# Patient Record
Sex: MALE | Race: WHITE | Employment: OTHER | URBAN - METROPOLITAN AREA
[De-identification: names, ages, dates, MRNs, and addresses within clinical notes are randomized per-mention and may not be internally consistent; named-entity substitution may affect disease eponyms.]

---

## 2017-04-04 DIAGNOSIS — E78.2 MIXED HYPERLIPIDEMIA: ICD-10-CM

## 2017-04-05 RX ORDER — SIMVASTATIN 20 MG/1
20 TABLET, FILM COATED ORAL
Qty: 90 TAB | Refills: 3 | Status: SHIPPED | OUTPATIENT
Start: 2017-04-05 | End: 2017-04-11 | Stop reason: SDUPTHER

## 2017-04-11 ENCOUNTER — OFFICE VISIT (OUTPATIENT)
Dept: FAMILY MEDICINE CLINIC | Age: 72
End: 2017-04-11

## 2017-04-11 ENCOUNTER — HOSPITAL ENCOUNTER (OUTPATIENT)
Dept: LAB | Age: 72
Discharge: HOME | End: 2017-04-11
Payer: MEDICARE

## 2017-04-11 VITALS
HEIGHT: 66 IN | OXYGEN SATURATION: 96 % | WEIGHT: 197 LBS | BODY MASS INDEX: 31.66 KG/M2 | TEMPERATURE: 98 F | SYSTOLIC BLOOD PRESSURE: 112 MMHG | HEART RATE: 71 BPM | DIASTOLIC BLOOD PRESSURE: 59 MMHG | RESPIRATION RATE: 16 BRPM

## 2017-04-11 DIAGNOSIS — I10 ESSENTIAL HYPERTENSION, BENIGN: Primary | ICD-10-CM

## 2017-04-11 DIAGNOSIS — E78.2 MIXED HYPERLIPIDEMIA: ICD-10-CM

## 2017-04-11 DIAGNOSIS — R73.9 ELEVATED BLOOD SUGAR: ICD-10-CM

## 2017-04-11 DIAGNOSIS — E55.9 VITAMIN D DEFICIENCY: ICD-10-CM

## 2017-04-11 DIAGNOSIS — E87.1 HYPONATREMIA: ICD-10-CM

## 2017-04-11 DIAGNOSIS — R73.03 PRE-DIABETES: ICD-10-CM

## 2017-04-11 DIAGNOSIS — C61 PROSTATE CA (HCC): ICD-10-CM

## 2017-04-11 PROCEDURE — 80061 LIPID PANEL: CPT

## 2017-04-11 PROCEDURE — 85025 COMPLETE CBC W/AUTO DIFF WBC: CPT

## 2017-04-11 PROCEDURE — 80053 COMPREHEN METABOLIC PANEL: CPT

## 2017-04-11 PROCEDURE — 82306 VITAMIN D 25 HYDROXY: CPT

## 2017-04-11 PROCEDURE — 86141 C-REACTIVE PROTEIN HS: CPT

## 2017-04-11 PROCEDURE — 83036 HEMOGLOBIN GLYCOSYLATED A1C: CPT

## 2017-04-11 PROCEDURE — 83090 ASSAY OF HOMOCYSTEINE: CPT

## 2017-04-11 RX ORDER — OLMESARTAN MEDOXOMIL 40 MG/1
TABLET ORAL
Qty: 90 TAB | Refills: 3 | Status: SHIPPED | OUTPATIENT
Start: 2017-04-11 | End: 2017-07-07 | Stop reason: SDUPTHER

## 2017-04-11 RX ORDER — SIMVASTATIN 20 MG/1
20 TABLET, FILM COATED ORAL
Qty: 90 TAB | Refills: 3 | Status: SHIPPED | OUTPATIENT
Start: 2017-04-11 | End: 2018-07-09 | Stop reason: SDUPTHER

## 2017-04-11 NOTE — LETTER
4/12/2017 4:18 PM 
 
Mr. Mavis Degroot [address] [facility] [address] Dear Mavis Grijalva: 
 
Please find your most recent results below. Resulted Orders LIPID PANEL Result Value Ref Range Cholesterol, total 158 100 - 199 mg/dL Triglyceride 103 0 - 149 mg/dL HDL Cholesterol 48 >39 mg/dL VLDL, calculated 21 5 - 40 mg/dL LDL, calculated 89 0 - 99 mg/dL Narrative Performed at:  [address]  [id number] : Ebony Alva MD, Phone:  [phone] METABOLIC PANEL, COMPREHENSIVE Result Value Ref Range Glucose 93 65 - 99 mg/dL BUN 18 8 - 27 mg/dL Creatinine 1.05 0.76 - 1.27 mg/dL GFR est non-AA 70 >59 mL/min/1.73 GFR est AA 80 >59 mL/min/1.73  
 BUN/Creatinine ratio 17 10 - 24 Sodium 134 134 - 144 mmol/L Potassium 5.1 3.5 - 5.2 mmol/L Chloride 93 (L) 96 - 106 mmol/L  
 CO2 25 18 - 29 mmol/L Calcium 9.1 8.6 - 10.2 mg/dL Protein, total 6.4 6.0 - 8.5 g/dL Albumin 4.2 3.5 - 4.8 g/dL GLOBULIN, TOTAL 2.2 1.5 - 4.5 g/dL A-G Ratio 1.9 1.2 - 2.2 Bilirubin, total 0.7 0.0 - 1.2 mg/dL Alk. phosphatase 38 (L) 39 - 117 IU/L  
 AST (SGOT) 28 0 - 40 IU/L  
 ALT (SGPT) 19 0 - 44 IU/L Narrative Performed at:  [address]  [id number] : Ebony Alva MD, Phone:  [phone] CRP, HIGH SENSITIVITY Result Value Ref Range C-Reactive Protein, Cardiac 0.62 0.00 - 3.00 mg/L Comment:  
            Relative Risk for Future Cardiovascular Event Low                 <1.00 Average       1.00 - 3.00 High                >3.00 Narrative Performed at:  [address]  [id number] : Ebony Alva MD, Phone:  [phone] CBC WITH AUTOMATED DIFF Result Value Ref Range WBC 6.0 3.4 - 10.8 x10E3/uL  
 RBC 4.66 4.14 - 5.80 x10E6/uL HGB 14.3 12.6 - 17.7 g/dL HCT 42.8 37.5 - 51.0 % MCV 92 79 - 97 fL  
 MCH 30.7 26.6 - 33.0 pg  
 MCHC 33.4 31.5 - 35.7 g/dL  
 RDW 13.8 12.3 - 15.4 % PLATELET 350 958 - 553 x10E3/uL NEUTROPHILS 60 % Lymphocytes 23 % MONOCYTES 12 % EOSINOPHILS 4 % BASOPHILS 1 %  
 ABS. NEUTROPHILS 3.6 1.4 - 7.0 x10E3/uL Abs Lymphocytes 1.4 0.7 - 3.1 x10E3/uL  
 ABS. MONOCYTES 0.7 0.1 - 0.9 x10E3/uL  
 ABS. EOSINOPHILS 0.2 0.0 - 0.4 x10E3/uL  
 ABS. BASOPHILS 0.0 0.0 - 0.2 x10E3/uL IMMATURE GRANULOCYTES 0 %  
 ABS. IMM. GRANS. 0.0 0.0 - 0.1 x10E3/uL Narrative Performed at:  [address]  [id number] : Olivia Fairbanks MD, Phone:  [phone] HEMOGLOBIN A1C WITH EAG Result Value Ref Range Hemoglobin A1c 5.8 (H) 4.8 - 5.6 % Comment:  
            Pre-diabetes: 5.7 - 6.4 Diabetes: >6.4 Glycemic control for adults with diabetes: <7.0 Estimated average glucose 120 mg/dL Narrative Performed at:  [address]  [id number] : Olivia Fairbanks MD, Phone:  [phone] VITAMIN D, 25 HYDROXY Result Value Ref Range VITAMIN D, 25-HYDROXY 82.1 30.0 - 100.0 ng/mL Comment:  
   Vitamin D deficiency has been defined by the [facility] practice guideline as a 
level of serum 25-OH vitamin D less than 20 ng/mL (1,2). The Endocrine Society went on to further define vitamin D 
insufficiency as a level between 21 and 29 ng/mL (2). 1. IOM ([place] [employer]). 2010. Dietary reference 
   intakes for calcium and D. [facility]: The 
   Fancred. 2. Cosme AMBROSE, Tian THOMASON, Gómez GILL, et al. 
   Evaluation, treatment, and prevention of vitamin D 
   deficiency: an Endocrine Society clinical practice guideline. JCEM. 2011 Jul; 96(7):1911-30. Narrative Performed at:  [address]  [id number] : Lex Arzate MD, Phone:  [phone] CVD REPORT Result Value Ref Range INTERPRETATION Note Comment:  
   Supplement report is available. Narrative Performed at:  [address] [place] [address]  [id number] : Betsy Marie PhD, Phone:  [phone] RECOMMENDATIONS: 
 
 After reviewing your medical history and your lab results, they appear at goal for you!    
 
Let us make 2017 a great year! Dr. Marta Cooley M.D. Good Help to those in Need! !!  
    
   
 
 
Please call me if you have any questions: [phone] Sincerely, Cherelle Bhandari MD

## 2017-04-11 NOTE — PROGRESS NOTES
1. Have you been to the ER, urgent care clinic since your last visit? Hospitalized since your last visit? No    2. Have you seen or consulted any other health care providers outside of the [address] [place] [address] since your last visit? Include any pap smears or colon screening. No   Chief Complaint   Patient presents with    Annual Wellness Visit     Pt present to the office for annual wellness phy, med refill (gen)      Too soon for medicare wellness but is due for labs      SOAP NOTE:    Chief Complaint   Patient presents with    Annual Wellness Visit     he is a 74y.o. year old male who presents for evalution. Reviewed PmHx, RxHx, FmHx, SocHx, AllgHx and updated and dated in the chart. Patient Active Problem List    Diagnosis    Advance care planning     Pt has a LW      Osteoporosis    Pre-diabetes    Hypogonadism male    Vitamin D deficiency    Prostate CA ([facility] [address]    Hyponatremia    OCD (obsessive compulsive disorder)    Anxiety state, unspecified    Mixed hyperlipidemia    Essential hypertension, benign       Review of Systems - negative except as listed above in the HPI    Objective:     Vitals:    04/11/17 0815   BP: 112/59   Pulse: 71   Resp: 16   Temp: 98 °F (36.7 °C)   TempSrc: Oral   SpO2: 96%   Weight: 197 lb (89.4 kg)   Height: 5' 6\" (1.676 m)     Physical Examination: General appearance - alert, well appearing, and in no distress  Neck - supple, no significant adenopathy  Chest - clear to auscultation, no wheezes, rales or rhonchi, symmetric air entry  Heart - normal rate, regular rhythm, normal S1, S2, no murmurs, rubs, clicks or gallops  Abdomen - soft, nontender, nondistended, no masses or organomegaly  Extremities - peripheral pulses normal, no pedal edema, no clubbing or cyanosis      Assessment/ Plan:   Fabiana Lombardi was seen today for annual wellness visit.     Diagnoses and all orders for this visit:    Essential hypertension, benign  -     LIPID PANEL  -     METABOLIC PANEL, COMPREHENSIVE  -     CRP, HIGH SENSITIVITY-at pt request  -     HOMOCYST(E)INE, PLASMA-at pt request  -     CBC WITH AUTOMATED DIFF  -at goal    Mixed hyperlipidemia  -     simvastatin (ZOCOR) 20 mg tablet; Take 1 Tab by mouth nightly. -     LIPID PANEL  -     METABOLIC PANEL, COMPREHENSIVE  -     CRP, HIGH SENSITIVITY    Vitamin D deficiency  -     VITAMIN D, 25 HYDROXY    Hyponatremia  -     METABOLIC PANEL, COMPREHENSIVE    Prostate CA ([facility] [address]  -up to date PSA    Pre-diabetes  -     HEMOGLOBIN A1C WITH EAG  -dwp diet  Lab Results   Component Value Date/Time    Hemoglobin A1c 5.9 10/18/2016 08:14 AM       Elevated blood sugar  -     HEMOGLOBIN A1C WITH EAG    Other orders  -     olmesartan (BENICAR) 40 mg tablet; TAKE 1 TABLET BY MOUTH EVERY DAY  Indications: hypertension       Follow-up Disposition:  Return in about 6 months (around 10/11/2017) for labs. I have discussed the diagnosis with the patient and the intended plan as seen in the above orders. The patient understands and agrees with the plan. The patient has received an after-visit summary and questions were answered concerning future plans. Medication Side Effects and Warnings were discussed with patient  Patient Labs were reviewed and or requested:  Patient Past Records were reviewed and or requested    Fartun Gonzalez M.D. There are no Patient Instructions on file for this visit.

## 2017-04-11 NOTE — MR AVS SNAPSHOT
Visit Information Date & Time Provider Department Dept. Phone Encounter #  
 4/11/2017  7:45 AM Mildred Love MD [address] [facility] [phone] [id number] Follow-up Instructions Return in about 6 months (around 10/11/2017) for labs. Upcoming Health Maintenance Date Due COLONOSCOPY 3/9/2017 MEDICARE YEARLY EXAM 4/14/2017 GLAUCOMA SCREENING Q2Y 4/11/2019 DTaP/Tdap/Td series (2 - Td) 8/28/2023 Allergies as of 4/11/2017  Review Complete On: 4/11/2017 By: Mildred Love MD  
  
 Severity Noted Reaction Type Reactions Niacin  07/18/2016    Rash Current Immunizations  Reviewed on 4/13/2016 Name Date Influenza High Dose Vaccine PF 10/3/2016, 9/28/2016 Influenza Vaccine 4/13/2016, 12/4/2014, 10/3/2013 Influenza Vaccine Split 10/18/2012, 11/2/2011 Pneumococcal Conjugate (PCV-13) 4/13/2016 Pneumococcal Vaccine (Unspecified Type) 11/2/2009 Tdap 8/28/2013 Zoster Vaccine, Live 8/19/2011 Not reviewed this visit You Were Diagnosed With   
  
 Codes Comments Essential hypertension, benign    -  Primary ICD-10-CM: I10 
ICD-9-CM: 401.1 Mixed hyperlipidemia     ICD-10-CM: E78.2 ICD-9-CM: 272.2 Vitamin D deficiency     ICD-10-CM: E55.9 ICD-9-CM: 268.9 Hyponatremia     ICD-10-CM: E87.1 ICD-9-CM: 276.1 Prostate CA [facility])     ICD-10-CM: B52 ICD-9-CM: 903 Pre-diabetes     ICD-10-CM: R73.03 
ICD-9-CM: 790.29 Elevated blood sugar     ICD-10-CM: R73.9 ICD-9-CM: 790.29 Vitals BP Pulse Temp Resp Height(growth percentile) Weight(growth percentile) 112/59 (BP 1 Location: Right arm, BP Patient Position: Sitting) 71 98 °F (36.7 °C) (Oral) 16 5' 6\" (1.676 m) 197 lb (89.4 kg) SpO2 BMI Smoking Status 96% 31.8 kg/m2 Never Smoker BMI and BSA Data Body Mass Index Body Surface Area  
 31.8 kg/m 2 2.04 m 2 Preferred Pharmacy Pharmacy Name Phone [address]  [phone] Your Updated Medication List  
  
   
This list is accurate as of: 4/11/17  8:35 AM.  Always use your most recent med list.  
  
  
  
  
 aspirin 81 mg chewable tablet Take 81 mg by mouth daily. BORON PO Take  by mouth.  
  
 calcium citrate-vitamin d3 315-200 mg-unit Tab Commonly known as:  CITRACAL+D Take 1 Tab by mouth daily (with breakfast). COQ10 (UBIQUINOL) PO Take  by mouth. glucosamine 1,000 mg Tab Take  by mouth. Lycopene 15 mg Cap Take  by mouth.  
  
 multivitamin, stress formula tablet Commonly known as:  STRESS TAB Take 1 Tab by mouth daily. olmesartan 40 mg tablet Commonly known as:  BENICAR  
TAKE 1 TABLET BY MOUTH EVERY DAY  Indications: hypertension Omega-3-DHA-EPA-Fish Oil 1,000 mg (120 mg-180 mg) Cap Take  by mouth.  
  
 pnv w/o calcium-iron fum-fa 27-1 mg Tab Take  by mouth. POMEGRANATE PO Take  by mouth. simvastatin 20 mg tablet Commonly known as:  ZOCOR Take 1 Tab by mouth nightly. SOY ISOFLAVONE PO Take  by mouth. VITAMIN D3 2,000 unit Tab Generic drug:  cholecalciferol (vitamin D3) Take  by mouth. Prescriptions Sent to Pharmacy Refills  
 olmesartan (BENICAR) 40 mg tablet 3 Sig: TAKE 1 TABLET BY MOUTH EVERY DAY  Indications: hypertension Class: Normal  
 Pharmacy: [facility] [address] [place] [address] Ph #: [phone]  
 simvastatin (ZOCOR) 20 mg tablet 3 Sig: Take 1 Tab by mouth nightly. Class: Normal  
 Pharmacy: [facility], [facility] Ph #: [phone] Route: Oral  
  
We Performed the Following CBC WITH AUTOMATED DIFF [89324 CPT(R)] CRP, HIGH SENSITIVITY [64306 CPT(R)] HEMOGLOBIN A1C WITH EAG [08928 CPT(R)] HOMOCYST(E)INE, PLASMA [id number] CPT(R)] LIPID PANEL [04765 CPT(R)] METABOLIC PANEL, COMPREHENSIVE [01702 CPT(R)] VITAMIN D, 25 HYDROXY [id number] CPT(R)] Follow-up Instructions Return in about 6 months (around 10/11/2017) for labs. Introducing [facility] & HEALTH SERVICES! Trudi Cobb introduces [employer] patient portal. Now you can access parts of your medical record, email your doctor's office, and request medication refills online. 1. In your internet browser, go to [email or web address] 2. Click on the First Time User? Click Here link in the Sign In box. You will see the New Member Sign Up page. 3. Enter your [employer] Access Code exactly as it appears below. You will not need to use this code after youve completed the sign-up process. If you do not sign up before the expiration date, you must request a new code. · [employer] Access Code: [email or web address] Expires: 7/10/2017  8:35 AM 
 
4. Enter the last four digits of your Social Security Number (xxxx) and Date of Birth (mm/dd/yyyy) as indicated and click Submit. You will be taken to the next sign-up page. 5. Create a [employer] ID. This will be your [employer] login ID and cannot be changed, so think of one that is secure and easy to remember. 6. Create a [employer] password. You can change your password at any time. 7. Enter your Password Reset Question and Answer. This can be used at a later time if you forget your password. 8. Enter your e-mail address. You will receive e-mail notification when new information is available in [address] 9. Click Sign Up. You can now view and download portions of your medical record. 10. Click the Download Summary menu link to download a portable copy of your medical information.  
 
If you have questions, please visit the Frequently Asked Questions section of the [employer]. Remember, [employer] is NOT to be used for urgent needs. For medical emergencies, dial 911. Now available from your iPhone and Android! Please provide this summary of care documentation to your next provider. Your primary care clinician is listed as MARE GOMEZ. If you have any questions after today's visit, please call [phone].

## 2017-04-12 LAB
25(OH)D3+25(OH)D2 SERPL-MCNC: 82.1 NG/ML (ref 30–100)
ALBUMIN SERPL-MCNC: 4.2 G/DL (ref 3.5–4.8)
ALBUMIN/GLOB SERPL: 1.9 {RATIO} (ref 1.2–2.2)
ALP SERPL-CCNC: 38 IU/L (ref 39–117)
ALT SERPL-CCNC: 19 IU/L (ref 0–44)
AST SERPL-CCNC: 28 IU/L (ref 0–40)
BASOPHILS # BLD AUTO: 0 X10E3/UL (ref 0–0.2)
BASOPHILS NFR BLD AUTO: 1 %
BILIRUB SERPL-MCNC: 0.7 MG/DL (ref 0–1.2)
BUN SERPL-MCNC: 18 MG/DL (ref 8–27)
BUN/CREAT SERPL: 17 (ref 10–24)
CALCIUM SERPL-MCNC: 9.1 MG/DL (ref 8.6–10.2)
CHLORIDE SERPL-SCNC: 93 MMOL/L (ref 96–106)
CHOLEST SERPL-MCNC: 158 MG/DL (ref 100–199)
CO2 SERPL-SCNC: 25 MMOL/L (ref 18–29)
CREAT SERPL-MCNC: 1.05 MG/DL (ref 0.76–1.27)
CRP SERPL HS-MCNC: 0.62 MG/L (ref 0–3)
EOSINOPHIL # BLD AUTO: 0.2 X10E3/UL (ref 0–0.4)
EOSINOPHIL NFR BLD AUTO: 4 %
ERYTHROCYTE [DISTWIDTH] IN BLOOD BY AUTOMATED COUNT: 13.8 % (ref 12.3–15.4)
EST. AVERAGE GLUCOSE BLD GHB EST-MCNC: 120 MG/DL
GLOBULIN SER CALC-MCNC: 2.2 G/DL (ref 1.5–4.5)
GLUCOSE SERPL-MCNC: 93 MG/DL (ref 65–99)
HBA1C MFR BLD: 5.8 % (ref 4.8–5.6)
HCT VFR BLD AUTO: 42.8 % (ref 37.5–51)
HCYS SERPL-SCNC: 10 UMOL/L (ref 0–15)
HDLC SERPL-MCNC: 48 MG/DL
HGB BLD-MCNC: 14.3 G/DL (ref 12.6–17.7)
IMM GRANULOCYTES # BLD: 0 X10E3/UL (ref 0–0.1)
IMM GRANULOCYTES NFR BLD: 0 %
INTERPRETATION, 910389: NORMAL
LDLC SERPL CALC-MCNC: 89 MG/DL (ref 0–99)
LYMPHOCYTES # BLD AUTO: 1.4 X10E3/UL (ref 0.7–3.1)
LYMPHOCYTES NFR BLD AUTO: 23 %
MCH RBC QN AUTO: 30.7 PG (ref 26.6–33)
MCHC RBC AUTO-ENTMCNC: 33.4 G/DL (ref 31.5–35.7)
MCV RBC AUTO: 92 FL (ref 79–97)
MONOCYTES # BLD AUTO: 0.7 X10E3/UL (ref 0.1–0.9)
MONOCYTES NFR BLD AUTO: 12 %
NEUTROPHILS # BLD AUTO: 3.6 X10E3/UL (ref 1.4–7)
NEUTROPHILS NFR BLD AUTO: 60 %
PLATELET # BLD AUTO: 239 X10E3/UL (ref 150–379)
POTASSIUM SERPL-SCNC: 5.1 MMOL/L (ref 3.5–5.2)
PROT SERPL-MCNC: 6.4 G/DL (ref 6–8.5)
RBC # BLD AUTO: 4.66 X10E6/UL (ref 4.14–5.8)
SODIUM SERPL-SCNC: 134 MMOL/L (ref 134–144)
TRIGL SERPL-MCNC: 103 MG/DL (ref 0–149)
VLDLC SERPL CALC-MCNC: 21 MG/DL (ref 5–40)
WBC # BLD AUTO: 6 X10E3/UL (ref 3.4–10.8)

## 2017-04-12 NOTE — PROGRESS NOTES
A letter was sent, to the address on file, with lab results and Dr. Rodas Pay recommendations for the pt.

## 2017-04-12 NOTE — PROGRESS NOTES
After reviewing your medical history and your lab results, they appear at goal for you! Let us make 2017 a great year! Dr. Brandt Palacios M.D.       Good Help to those in Need!!!

## 2017-06-12 ENCOUNTER — OFFICE VISIT (OUTPATIENT)
Dept: FAMILY MEDICINE CLINIC | Age: 72
End: 2017-06-12

## 2017-06-12 VITALS
TEMPERATURE: 98.4 F | HEART RATE: 102 BPM | HEIGHT: 66 IN | SYSTOLIC BLOOD PRESSURE: 111 MMHG | DIASTOLIC BLOOD PRESSURE: 66 MMHG | OXYGEN SATURATION: 99 % | WEIGHT: 197.13 LBS | BODY MASS INDEX: 31.68 KG/M2 | RESPIRATION RATE: 20 BRPM

## 2017-06-12 DIAGNOSIS — J06.9 UPPER RESPIRATORY TRACT INFECTION, UNSPECIFIED TYPE: ICD-10-CM

## 2017-06-12 DIAGNOSIS — Z00.00 ROUTINE GENERAL MEDICAL EXAMINATION AT A HEALTH CARE FACILITY: Primary | ICD-10-CM

## 2017-06-12 DIAGNOSIS — I10 ESSENTIAL HYPERTENSION, BENIGN: ICD-10-CM

## 2017-06-12 DIAGNOSIS — Z71.89 ADVANCED CARE PLANNING/COUNSELING DISCUSSION: ICD-10-CM

## 2017-06-12 RX ORDER — LORATADINE 10 MG/1
10 TABLET ORAL DAILY
Qty: 30 TAB | Refills: 11 | Status: SHIPPED | OUTPATIENT
Start: 2017-06-12 | End: 2017-12-29

## 2017-06-12 NOTE — MR AVS SNAPSHOT
Visit Information Date & Time Provider Department Dept. Phone Encounter #  
 6/12/2017  1:15 PM Adriana Salinas MD [address] [facility] [phone] [id number] Follow-up Instructions Return if symptoms worsen or fail to improve. Upcoming Health Maintenance Date Due COLONOSCOPY 3/9/2017 MEDICARE YEARLY EXAM 4/14/2017 INFLUENZA AGE 9 TO ADULT 8/1/2017 GLAUCOMA SCREENING Q2Y 4/11/2019 DTaP/Tdap/Td series (2 - Td) 8/28/2023 Allergies as of 6/12/2017  Review Complete On: 6/12/2017 By: Adriana Salinas MD  
  
 Severity Noted Reaction Type Reactions Codeine  06/12/2017    Drowsiness Niacin  07/18/2016    Rash Current Immunizations  Reviewed on 4/13/2016 Name Date Influenza High Dose Vaccine PF 10/3/2016, 9/28/2016 Influenza Vaccine 4/13/2016, 12/4/2014, 10/3/2013 Influenza Vaccine Split 10/18/2012, 11/2/2011 Pneumococcal Conjugate (PCV-13) 4/13/2016 Pneumococcal Vaccine (Unspecified Type) 11/2/2009 Tdap 8/28/2013 Zoster Vaccine, Live 8/19/2011 Not reviewed this visit You Were Diagnosed With   
  
 Codes Comments Upper respiratory tract infection, unspecified type    -  Primary ICD-10-CM: J06.9 ICD-9-CM: 465.9 Vitals BP Pulse Temp Resp Height(growth percentile) Weight(growth percentile) 111/66 (BP 1 Location: Left arm, BP Patient Position: Sitting) (!) 102 98.4 °F (36.9 °C) (Oral) 20 5' 6\" (1.676 m) 197 lb 2 oz (89.4 kg) SpO2 BMI Smoking Status 99% 31.82 kg/m2 Never Smoker Vitals History BMI and BSA Data Body Mass Index Body Surface Area  
 31.82 kg/m 2 2.04 m 2 Preferred Pharmacy Pharmacy Name Phone [address] [phone] Your Updated Medication List  
  
   
This list is accurate as of: 6/12/17  2:03 PM.  Always use your most recent med list.  
  
  
  
 aspirin 81 mg chewable tablet Take 81 mg by mouth daily. BORON PO Take  by mouth.  
  
 calcium citrate-vitamin d3 315-200 mg-unit Tab Commonly known as:  CITRACAL+D Take 1 Tab by mouth daily (with breakfast). COQ10 (UBIQUINOL) PO Take  by mouth. glucosamine 1,000 mg Tab Take  by mouth.  
  
 loratadine 10 mg tablet Commonly known as:  Mehnaz Knows Take 1 Tab by mouth daily for 360 days. Lycopene 15 mg Cap Take  by mouth.  
  
 multivitamin, stress formula tablet Commonly known as:  STRESS TAB Take 1 Tab by mouth daily. olmesartan 40 mg tablet Commonly known as:  BENICAR  
TAKE 1 TABLET BY MOUTH EVERY DAY  Indications: hypertension Omega-3-DHA-EPA-Fish Oil 1,000 mg (120 mg-180 mg) Cap Take  by mouth.  
  
 pnv w/o calcium-iron fum-fa 27-1 mg Tab Take  by mouth. POMEGRANATE PO Take  by mouth. ROBITUSSIN COUGH-CHEST WINTER DM  mg/5 mL Liqd Generic drug:  dextromethorphan-guaiFENesin Take  by mouth. simvastatin 20 mg tablet Commonly known as:  ZOCOR Take 1 Tab by mouth nightly. SOY ISOFLAVONE PO Take  by mouth. VITAMIN D3 2,000 unit Tab Generic drug:  cholecalciferol (vitamin D3) Take  by mouth. Prescriptions Sent to Pharmacy Refills  
 loratadine (CLARITIN) 10 mg tablet 11 Sig: Take 1 Tab by mouth daily for 360 days. Class: Normal  
 Pharm[facility]: [facility] [facility] [place] Ph #: [phone] Route: Oral  
  
Follow-up Instructions Return if symptoms worsen or fail to improve. Introducing [facility] & HEALTH SERVICES! Dear Elli Mccormick: 
Thank you for requesting a [employer] account. Our records indicate that you already have an active [employer] account. You can access your account anytime at [email or web address] Did you know that you can access your hospital and ER discharge instructions at any time in [employer]? You can also review all of your test results from your hospital stay or ER visit. Additional Information If you have questions, please visit the Frequently Asked Questions section of the [employer] website at [email or web address] Remember, [employer] is NOT to be used for urgent needs. For medical emergencies, dial 911. Now available from your iPhone and Android! Please provide this summary of care documentation to your next provider. Your primary care clinician is listed as MARE GOMEZ. If you have any questions after today's visit, please call [phone].

## 2017-06-12 NOTE — PROGRESS NOTES
1. Have you been to the ER, urgent care clinic since your last visit? Hospitalized since your last visit? No    2. Have you seen or consulted any other health care providers outside of the [address] [place] [address] since your last visit? Include any pap smears or colon screening. No   Chief Complaint   Patient presents with    Cold Symptoms     cold symptoms started on Friday- cough ,congestion runny nose (worse at night) , some sore throat       Due for med cpx      Medicare Wellness Exam:    Chief Complaint   Patient presents with    Cold Symptoms     cold symptoms started on Friday- cough ,congestion runny nose (worse at night) , some sore throat     he is a 74y.o. year old male who presents for evaluation for their Medicare Wellness Visit. [place] is completed and assessed=yes  Depression Screen is completed and assessed=yes  Medication list reviewed and adjusted for accuracy=yes  Immunizations reviewed and updated=yes  Health/Preventative Screenings reviewed and updated=yes  ADL Functions reviewed=yes    Patient Active Problem List    Diagnosis    Advanced care planning/counseling discussion     Has LW      Advance care planning     Pt has a LW      Osteoporosis    Pre-diabetes    Hypogonadism male    Vitamin D deficiency    Prostate CA ([facility] [address]    Hyponatremia    OCD (obsessive compulsive disorder)    Anxiety state, unspecified    Mixed hyperlipidemia    Essential hypertension, benign       Reviewed PmHx, RxHx, FmHx, SocHx, AllgHx and updated and dated in the chart.     Review of Systems - negative except as listed above in the HPI    Objective:     Vitals:    06/12/17 1340   BP: 111/66   Pulse: (!) 102   Resp: 20   Temp: 98.4 °F (36.9 °C)   TempSrc: Oral   SpO2: 99%   Weight: 197 lb 2 oz (89.4 kg)   Height: 5' 6\" (1.676 m)     Physical Examination: General appearance - alert, well appearing, and in no distress  Eyes - pupils equal and reactive, extraocular eye movements intact  Ears - bilateral TM's and external ear canals normal  Nose - normal and patent, no erythema, discharge or polyps  Mouth - mucous membranes moist, pharynx normal without lesions  Neck - supple, no significant adenopathy  Chest - clear to auscultation, no wheezes, rales or rhonchi, symmetric air entry  Heart - normal rate, regular rhythm, normal S1, S2, no murmurs, rubs, clicks or gallops      Assessment/ Plan:   Leila Vásquez was seen today for cold symptoms. Diagnoses and all orders for this visit:    Routine general medical examination at a health care facility  -see below    Upper respiratory tract infection, unspecified type  -     loratadine (CLARITIN) 10 mg tablet; Take 1 Tab by mouth daily for 360 days. Essential hypertension, benign  -at goal    Advanced care planning/counseling discussion  -has LW       -Pain evaluation performed in office  -Cognitive Screen performed in office  -Depression Screen, Fall risks (by up and go test)  and ADL functionality were addressed  -Medication list updated and reviewed for any changes   -A comprehensive review of medical issues and a plan was formulated  -End of life planning was addressed with pt   -Health Screenings for preventions were addressed and a plan was formulated  -Shingles Vaccine was recommended  -Discussed with patient cancer risk factors and appropriate screenings for age  -Patient evaluated for colonoscopy and referred if needed per screeing criteria  -Labs from previous visits were discussed with patient   -Discussed with patient diet and exercise and formulated a plan as needed  -An Advanced care plan was developed with the patient.  -Alcohol screening performed and was negative    -Follow-up Disposition:  Return if symptoms worsen or fail to improve. I have discussed the diagnosis with the patient and the intended plan as seen in the above orders. The patient understands and agrees with the plan.  The patient has received an after-visit summary and questions were answered concerning future plans. Medication Side Effects and Warnings were discussed with patien  Patient Labs were reviewed and or requested  Patient Past Records were reviewed and or requested    There are no Patient Instructions on file for this visit.       Heidi Alvarenga M.D.

## 2017-07-07 RX ORDER — OLMESARTAN MEDOXOMIL 40 MG/1
TABLET ORAL
Qty: 90 TAB | Refills: 3 | Status: SHIPPED | OUTPATIENT
Start: 2017-07-07 | End: 2018-07-09 | Stop reason: SDUPTHER

## 2017-07-24 ENCOUNTER — OFFICE VISIT (OUTPATIENT)
Dept: FAMILY MEDICINE CLINIC | Age: 72
End: 2017-07-24

## 2017-07-24 VITALS
RESPIRATION RATE: 18 BRPM | DIASTOLIC BLOOD PRESSURE: 64 MMHG | HEIGHT: 66 IN | WEIGHT: 194 LBS | BODY MASS INDEX: 31.18 KG/M2 | OXYGEN SATURATION: 95 % | HEART RATE: 88 BPM | TEMPERATURE: 97.5 F | SYSTOLIC BLOOD PRESSURE: 105 MMHG

## 2017-07-24 DIAGNOSIS — H92.01 REFERRED EAR PAIN, RIGHT: ICD-10-CM

## 2017-07-24 DIAGNOSIS — I10 ESSENTIAL HYPERTENSION, BENIGN: Primary | ICD-10-CM

## 2017-07-24 NOTE — MR AVS SNAPSHOT
Visit Information Date & Time Provider Department Dept. Phone Encounter #  
 7/24/2017  1:40 PM Gabriel Beasley MD [address] [facility] [phone] [id number] Follow-up Instructions Return if symptoms worsen or fail to improve. Upcoming Health Maintenance Date Due INFLUENZA AGE 9 TO ADULT 8/1/2017 MEDICARE YEARLY EXAM 6/13/2018 GLAUCOMA SCREENING Q2Y 4/11/2019 DTaP/Tdap/Td series (2 - Td) 8/28/2023 COLONOSCOPY 7/24/2027 Allergies as of 7/24/2017  Review Complete On: 7/24/2017 By: Gabriel Beasley MD  
  
 Severity Noted Reaction Type Reactions Codeine  06/12/2017    Drowsiness Niacin  07/18/2016    Rash Current Immunizations  Reviewed on 4/13/2016 Name Date Influenza High Dose Vaccine PF 10/3/2016, 9/28/2016 Influenza Vaccine 4/13/2016, 12/4/2014, 10/3/2013 Influenza Vaccine Split 10/18/2012, 11/2/2011 Pneumococcal Conjugate (PCV-13) 4/13/2016 Pneumococcal Vaccine (Unspecified Type) 11/2/2009 Tdap 8/28/2013 Zoster Vaccine, Live 8/19/2011 Not reviewed this visit You Were Diagnosed With   
  
 Codes Comments Essential hypertension, benign    -  Primary ICD-10-CM: I10 
ICD-9-CM: 401.1 Referred ear pain, right     ICD-10-CM: H92.01 
ICD-9-CM: 388.72 Vitals BP Pulse Temp Resp Height(growth percentile) Weight(growth percentile) 105/64 88 97.5 °F (36.4 °C) 18 5' 6\" (1.676 m) 194 lb (88 kg) SpO2 BMI Smoking Status 95% 31.31 kg/m2 Never Smoker Vitals History BMI and BSA Data Body Mass Index Body Surface Area  
 31.31 kg/m 2 2.02 m 2 Preferred Pharmacy Pharmacy Name Phone [facility] [phone] Your Updated Medication List  
  
   
This list is accurate as of: 7/24/17  2:15 PM.  Always use your most recent med list.  
  
  
  
  
 aspirin 81 mg chewable tablet Take 81 mg by mouth daily. BORON PO Take  by mouth.  
  
 calcium citrate-vitamin d3 315-200 mg-unit Tab Commonly known as:  CITRACAL+D Take 1 Tab by mouth daily (with breakfast). COQ10 (UBIQUINOL) PO Take  by mouth. glucosamine 1,000 mg Tab Take  by mouth.  
  
 loratadine 10 mg tablet Commonly known as:  Sharilyn Feil Take 1 Tab by mouth daily for 360 days. Lycopene 15 mg Cap Take  by mouth.  
  
 multivitamin, stress formula tablet Commonly known as:  STRESS TAB Take 1 Tab by mouth daily. olmesartan 40 mg tablet Commonly known as:  BENICAR  
TAKE 1 TABLET BY MOUTH EVERY DAY  Indications: hypertension Omega-3-DHA-EPA-Fish Oil 1,000 mg (120 mg-180 mg) Cap Take  by mouth.  
  
 pnv w/o calcium-iron fum-fa 27-1 mg Tab Take  by mouth. POMEGRANATE PO Take  by mouth. ROBITUSSIN COUGH-CHEST WINTER DM  mg/5 mL Liqd Generic drug:  dextromethorphan-guaiFENesin Take  by mouth. simvastatin 20 mg tablet Commonly known as:  ZOCOR Take 1 Tab by mouth nightly. SOY ISOFLAVONE PO Take  by mouth. VITAMIN D3 2,000 unit Tab Generic drug:  cholecalciferol (vitamin D3) Take  by mouth. Follow-up Instructions Return if symptoms worsen or fail to improve. \[facility] [place] [facility]\"" & HEALTH SERVICES! Dear Jennyfer Wan: 
Thank you for requesting a LiquiGlide account. Our records indicate that you already have an active LiquiGlide account. You can access your account anytime at [email or web address] Did you know that you can access your hospital and ER discharge instructions at any time in LiquiGlide? You can also review all of your test results from your hospital stay or ER visit. Additional Information If you have questions, please visit the Frequently Asked Questions section of the LiquiGlide website at [email or web address] Remember, Brandtologyhart is NOT to be used for urgent needs. For medical emergencies, dial 911. Now available from your iPhone and Android! Please provide this summary of care documentation to your next provider. Your primary care clinician is listed as MARE GOMEZ. If you have any questions after today's visit, please call [phone].

## 2017-07-24 NOTE — PROGRESS NOTES
Patient here for right ear blocked. 1. Have you been to the ER, urgent care clinic since your last visit? Hospitalized since your last visit? No    2. Have you seen or consulted any other health care providers outside of the [facility] since your last visit? Include any pap smears or colon screening. No       Chief Complaint   Patient presents with    Ear Pain     right ear blocked     he is a 74y.o. year old male who presents for evalution. Reviewed PmHx, RxHx, FmHx, SocHx, AllgHx and updated and dated in the chart. Patient Active Problem List    Diagnosis    Advanced care planning/counseling discussion     Has LW      Advance care planning     Pt has a LW      Osteoporosis    Pre-diabetes    Hypogonadism male    Vitamin D deficiency    Prostate CA ([facility] [address]    Hyponatremia    OCD (obsessive compulsive disorder)    Anxiety state, unspecified    Mixed hyperlipidemia    Essential hypertension, benign       Review of Systems - negative except as listed above in the HPI    Objective:     Vitals:    07/24/17 1354   BP: 105/64   Pulse: 88   Resp: 18   Temp: 97.5 °F (36.4 °C)   SpO2: 95%   Weight: 194 lb (88 kg)   Height: 5' 6\" (1.676 m)     Physical Examination: General appearance - alert, well appearing, and in no distress  Ears - ceruminosis noted, cerumen removed        Assessment/ Plan:   Miguel Ángel Chacon was seen today for ear pain. Diagnoses and all orders for this visit:    Essential hypertension, benign  -at goal    Referred ear pain, right  -irrigated       Follow-up Disposition:  Return if symptoms worsen or fail to improve. I have discussed the diagnosis with the patient and the intended plan as seen in the above orders. The patient understands and agrees with the plan. The patient has received an after-visit summary and questions were answered concerning future plans.      Medication Side Effects and Warnings were discussed with patient  Patient Labs were reviewed and or requested:  Patient Past Records were reviewed and or requested    Leanne Ahmadi M.D. There are no Patient Instructions on file for this visit.

## 2017-10-24 ENCOUNTER — OFFICE VISIT (OUTPATIENT)
Dept: FAMILY MEDICINE CLINIC | Age: 72
End: 2017-10-24

## 2017-10-24 ENCOUNTER — HOSPITAL ENCOUNTER (OUTPATIENT)
Dept: LAB | Age: 72
Discharge: HOME | End: 2017-10-24
Payer: MEDICARE

## 2017-10-24 VITALS
TEMPERATURE: 97.9 F | RESPIRATION RATE: 18 BRPM | HEART RATE: 68 BPM | BODY MASS INDEX: 31.02 KG/M2 | SYSTOLIC BLOOD PRESSURE: 114 MMHG | DIASTOLIC BLOOD PRESSURE: 70 MMHG | HEIGHT: 66 IN | OXYGEN SATURATION: 98 % | WEIGHT: 193 LBS

## 2017-10-24 DIAGNOSIS — C61 PROSTATE CA (HCC): ICD-10-CM

## 2017-10-24 DIAGNOSIS — E55.9 VITAMIN D DEFICIENCY: ICD-10-CM

## 2017-10-24 DIAGNOSIS — E87.1 HYPONATREMIA: ICD-10-CM

## 2017-10-24 DIAGNOSIS — I10 ESSENTIAL HYPERTENSION, BENIGN: Primary | ICD-10-CM

## 2017-10-24 DIAGNOSIS — E78.2 MIXED HYPERLIPIDEMIA: ICD-10-CM

## 2017-10-24 DIAGNOSIS — R73.9 ELEVATED BLOOD SUGAR: ICD-10-CM

## 2017-10-24 PROCEDURE — 85025 COMPLETE CBC W/AUTO DIFF WBC: CPT

## 2017-10-24 PROCEDURE — 86141 C-REACTIVE PROTEIN HS: CPT

## 2017-10-24 PROCEDURE — 80053 COMPREHEN METABOLIC PANEL: CPT

## 2017-10-24 PROCEDURE — 83036 HEMOGLOBIN GLYCOSYLATED A1C: CPT

## 2017-10-24 PROCEDURE — 83090 ASSAY OF HOMOCYSTEINE: CPT

## 2017-10-24 PROCEDURE — 82306 VITAMIN D 25 HYDROXY: CPT

## 2017-10-24 PROCEDURE — 80061 LIPID PANEL: CPT

## 2017-10-24 PROCEDURE — 84443 ASSAY THYROID STIM HORMONE: CPT

## 2017-10-24 NOTE — PROGRESS NOTES
Chief Complaint   Patient presents with    Complete Physical    Labs Only     Pt presents to the office for CPE. Labs    1. Have you been to the ER, urgent care clinic since your last visit? Hospitalized since your last visit? No    2. Have you seen or consulted any other health care providers outside of the [address] [place] [address] since your last visit? Include any pap smears or colon screening. No      Chief Complaint   Patient presents with    Complete Physical    Labs Only     He is a 74 y.o. male who presents for evalution. Reviewed PmHx, RxHx, FmHx, SocHx, AllgHx and updated and dated in the chart. Patient Active Problem List    Diagnosis    Advanced care planning/counseling discussion     Has LW      Advance care planning     Pt has a LW      Osteoporosis    Pre-diabetes    Hypogonadism male    Vitamin D deficiency    Prostate CA ([facility] [address]    Hyponatremia    OCD (obsessive compulsive disorder)    Anxiety state, unspecified    Mixed hyperlipidemia    Essential hypertension, benign       Review of Systems - negative except as listed above in the HPI    Objective:     Vitals:    10/24/17 0816   BP: 114/70   Pulse: 68   Resp: 18   Temp: 97.9 °F (36.6 °C)   TempSrc: Oral   SpO2: 98%   Weight: 193 lb (87.5 kg)   Height: 5' 6\" (1.676 m)     Physical Examination: General appearance - alert, well appearing, and in no distress  Neck - supple, no significant adenopathy  Chest - clear to auscultation, no wheezes, rales or rhonchi, symmetric air entry  Heart - normal rate, regular rhythm, normal S1, S2, no murmurs, rubs, clicks or gallops  Abdomen - soft, nontender, nondistended, no masses or organomegaly  Extremities - peripheral pulses normal, no pedal edema, no clubbing or cyanosis    Assessment/ Plan:   Diagnoses and all orders for this visit:    1.  Essential hypertension, benign  -     LIPID PANEL  -     METABOLIC PANEL, COMPREHENSIVE  -     CRP, HIGH SENSITIVITY  -     HOMOCYSTEINE, PLASMA  -at goal    2. Hyponatremia  -     METABOLIC PANEL, COMPREHENSIVE    3. Mixed hyperlipidemia  -     LIPID PANEL  -     METABOLIC PANEL, COMPREHENSIVE  -     TSH 3RD GENERATION    4. Elevated blood sugar  -     METABOLIC PANEL, COMPREHENSIVE  -     HEMOGLOBIN A1C WITH EAG    5. Prostate CA ([address]  -     CBC WITH AUTOMATED DIFF    6. Vitamin D deficiency  -     VITAMIN D, 25 HYDROXY       Follow-up Disposition:  Return in about 6 months (around 4/24/2018). I have discussed the diagnosis with the patient and the intended plan as seen in the above orders. The patient understands and agrees with the plan. The patient has received an after-visit summary and questions were answered concerning future plans. Medication Side Effects and Warnings were discussed with patient  Patient Labs were reviewed and or requested:  Patient Past Records were reviewed and or requested    Jojo Castillo M.D. There are no Patient Instructions on file for this visit.

## 2017-10-24 NOTE — MR AVS SNAPSHOT
Visit Information Date & Time Provider Department Dept. Phone Encounter #  
 10/24/2017  7:45 AM Claudine Ortiz MD [address] [facility] [phone] [id number] Follow-up Instructions Return in about 6 months (around 4/24/2018). Upcoming Health Maintenance Date Due  
 MEDICARE YEARLY EXAM 6/13/2018 GLAUCOMA SCREENING Q2Y 4/11/2019 DTaP/Tdap/Td series (2 - Td) 8/28/2023 COLONOSCOPY 7/24/2027 Allergies as of 10/24/2017  Review Complete On: 10/24/2017 By: Claudine Ortiz MD  
  
 Severity Noted Reaction Type Reactions Codeine  06/12/2017    Drowsiness Niacin  07/18/2016    Rash Current Immunizations  Reviewed on 10/24/2017 Name Date Influenza High Dose Vaccine PF 10/24/2017, 10/3/2016, 9/28/2016 Influenza Vaccine 4/13/2016, 12/4/2014, 10/3/2013 Influenza Vaccine Split 10/18/2012, 11/2/2011 Pneumococcal Conjugate (PCV-13) 4/13/2016 Pneumococcal Polysaccharide (PPSV-23) 10/24/2009 Tdap 8/28/2013 ZZZ-RETIRED (DO NOT USE) Pneumococcal Vaccine (Unspecified Type) 11/2/2009 Zoster Vaccine, Live 8/19/2011 Reviewed by Claudine Ortiz MD on 10/24/2017 at  8:24 AM  
You Were Diagnosed With   
  
 Codes Comments Essential hypertension, benign    -  Primary ICD-10-CM: I10 
ICD-9-CM: 401.1 Hyponatremia     ICD-10-CM: E87.1 ICD-9-CM: 276.1 Mixed hyperlipidemia     ICD-10-CM: E78.2 ICD-9-CM: 272.2 Elevated blood sugar     ICD-10-CM: R73.9 ICD-9-CM: 790.29 Prostate CA [facility])     ICD-10-CM: A44 ICD-9-CM: 424 Vitamin D deficiency     ICD-10-CM: E55.9 ICD-9-CM: 268.9 Vitals BP Pulse Temp Resp Height(growth percentile) Weight(growth percentile) 114/70 68 97.9 °F (36.6 °C) (Oral) 18 5' 6\" (1.676 m) 193 lb (87.5 kg) SpO2 BMI Smoking Status 98% 31.15 kg/m2 Never Smoker BMI and BSA Data Body Mass Index Body Surface Area  
 31.15 kg/m 2 2.02 m 2 Preferred Pharmacy Pharmacy Name Phone [phone] [facility] [phone] Your Updated Medication List  
  
   
This list is accurate as of: 10/24/17  8:27 AM.  Always use your most recent med list.  
  
  
  
  
 aspirin 81 mg chewable tablet Take 81 mg by mouth daily. BORON PO Take  by mouth.  
  
 calcium citrate-vitamin d3 315-200 mg-unit Tab Commonly known as:  CITRACAL+D Take 1 Tab by mouth daily (with breakfast). COQ10 (UBIQUINOL) PO Take  by mouth. glucosamine 1,000 mg Tab Take  by mouth.  
  
 loratadine 10 mg tablet Commonly known as:  Singh Hippo Take 1 Tab by mouth daily for 360 days. Lycopene 15 mg Cap Take  by mouth.  
  
 multivitamin, stress formula tablet Commonly known as:  STRESS TAB Take 1 Tab by mouth daily. olmesartan 40 mg tablet Commonly known as:  BENICAR  
TAKE 1 TABLET BY MOUTH EVERY DAY  Indications: hypertension Omega-3-DHA-EPA-Fish Oil 1,000 mg (120 mg-180 mg) Cap Take  by mouth.  
  
 pnv w/o calcium-iron fum-fa 27-1 mg Tab Take  by mouth. POMEGRANATE PO Take  by mouth. ROBITUSSIN COUGH-CHEST WINTER DM  mg/5 mL Liqd Generic drug:  dextromethorphan-guaiFENesin Take  by mouth. simvastatin 20 mg tablet Commonly known as:  ZOCOR Take 1 Tab by mouth nightly. SOY ISOFLAVONE PO Take  by mouth. VITAMIN D3 2,000 unit Tab Generic drug:  cholecalciferol (vitamin D3) Take  by mouth. We Performed the Following CBC WITH AUTOMATED DIFF [77859 CPT(R)] CRP, HIGH SENSITIVITY [54705 CPT(R)] HEMOGLOBIN A1C WITH EAG [00692 CPT(R)] HOMOCYSTEINE, PLASMA [id number] CPT(R)] LIPID PANEL [10405 CPT(R)] METABOLIC PANEL, COMPREHENSIVE [72417 CPT(R)] TSH 3RD GENERATION [21318 CPT(R)] VITAMIN D, 25 HYDROXY [id number] CPT(R)] Follow-up Instructions Return in about 6 months (around 4/24/2018). Introducing hospitals & HEALTH SERVICES! Dear Porfirio Triana: 
Thank you for requesting a Mass Vector account. Our records indicate that you already have an active Mass Vector account. You can access your account anytime at [email or web address] [email or web address] Did you know that you can access your hospital and ER discharge instructions at any time in Mass Vector? You can also review all of your test results from your hospital stay or ER visit. Additional Information If you have questions, please visit the Frequently Asked Questions section of the Mass Vector website at [email or web address] Remember, Mass Vector is NOT to be used for urgent needs. For medical emergencies, dial 911. Now available from your iPhone and Android! Please provide this summary of care documentation to your next provider. Your primary care clinician is listed as MARE GOMEZ. If you have any questions after today's visit, please call [phone].

## 2017-10-25 LAB
25(OH)D3+25(OH)D2 SERPL-MCNC: 86.7 NG/ML (ref 30–100)
ALBUMIN SERPL-MCNC: 4.4 G/DL (ref 3.5–4.8)
ALBUMIN/GLOB SERPL: 2 {RATIO} (ref 1.2–2.2)
ALP SERPL-CCNC: 38 IU/L (ref 39–117)
ALT SERPL-CCNC: 21 IU/L (ref 0–44)
AST SERPL-CCNC: 27 IU/L (ref 0–40)
BASOPHILS # BLD AUTO: 0 X10E3/UL (ref 0–0.2)
BASOPHILS NFR BLD AUTO: 1 %
BILIRUB SERPL-MCNC: 0.6 MG/DL (ref 0–1.2)
BUN SERPL-MCNC: 21 MG/DL (ref 8–27)
BUN/CREAT SERPL: 21 (ref 10–24)
CALCIUM SERPL-MCNC: 9.3 MG/DL (ref 8.6–10.2)
CHLORIDE SERPL-SCNC: 95 MMOL/L (ref 96–106)
CHOLEST SERPL-MCNC: 161 MG/DL (ref 100–199)
CO2 SERPL-SCNC: 22 MMOL/L (ref 18–29)
CREAT SERPL-MCNC: 1.01 MG/DL (ref 0.76–1.27)
CRP SERPL HS-MCNC: 0.76 MG/L (ref 0–3)
EOSINOPHIL # BLD AUTO: 0.2 X10E3/UL (ref 0–0.4)
EOSINOPHIL NFR BLD AUTO: 4 %
ERYTHROCYTE [DISTWIDTH] IN BLOOD BY AUTOMATED COUNT: 14 % (ref 12.3–15.4)
EST. AVERAGE GLUCOSE BLD GHB EST-MCNC: 114 MG/DL
GFR SERPLBLD CREATININE-BSD FMLA CKD-EPI: 73 ML/MIN/1.73
GFR SERPLBLD CREATININE-BSD FMLA CKD-EPI: 84 ML/MIN/1.73
GLOBULIN SER CALC-MCNC: 2.2 G/DL (ref 1.5–4.5)
GLUCOSE SERPL-MCNC: 95 MG/DL (ref 65–99)
HBA1C MFR BLD: 5.6 % (ref 4.8–5.6)
HCT VFR BLD AUTO: 41.6 % (ref 37.5–51)
HCYS SERPL-SCNC: 9.6 UMOL/L (ref 0–15)
HDLC SERPL-MCNC: 43 MG/DL
HGB BLD-MCNC: 14 G/DL (ref 12.6–17.7)
IMM GRANULOCYTES # BLD: 0 X10E3/UL (ref 0–0.1)
IMM GRANULOCYTES NFR BLD: 0 %
INTERPRETATION, 910389: NORMAL
LDLC SERPL CALC-MCNC: 92 MG/DL (ref 0–99)
LYMPHOCYTES # BLD AUTO: 1.3 X10E3/UL (ref 0.7–3.1)
LYMPHOCYTES NFR BLD AUTO: 23 %
MCH RBC QN AUTO: 31 PG (ref 26.6–33)
MCHC RBC AUTO-ENTMCNC: 33.7 G/DL (ref 31.5–35.7)
MCV RBC AUTO: 92 FL (ref 79–97)
MONOCYTES # BLD AUTO: 0.8 X10E3/UL (ref 0.1–0.9)
MONOCYTES NFR BLD AUTO: 14 %
NEUTROPHILS # BLD AUTO: 3.2 X10E3/UL (ref 1.4–7)
NEUTROPHILS NFR BLD AUTO: 58 %
PLATELET # BLD AUTO: 210 X10E3/UL (ref 150–379)
POTASSIUM SERPL-SCNC: 5 MMOL/L (ref 3.5–5.2)
PROT SERPL-MCNC: 6.6 G/DL (ref 6–8.5)
RBC # BLD AUTO: 4.51 X10E6/UL (ref 4.14–5.8)
SODIUM SERPL-SCNC: 134 MMOL/L (ref 134–144)
TRIGL SERPL-MCNC: 130 MG/DL (ref 0–149)
TSH SERPL DL<=0.005 MIU/L-ACNC: 2.54 UIU/ML (ref 0.45–4.5)
VLDLC SERPL CALC-MCNC: 26 MG/DL (ref 5–40)
WBC # BLD AUTO: 5.7 X10E3/UL (ref 3.4–10.8)

## 2017-12-11 ENCOUNTER — OFFICE VISIT (OUTPATIENT)
Dept: FAMILY MEDICINE CLINIC | Age: 72
End: 2017-12-11

## 2017-12-11 VITALS
SYSTOLIC BLOOD PRESSURE: 110 MMHG | HEIGHT: 66 IN | WEIGHT: 193 LBS | BODY MASS INDEX: 31.02 KG/M2 | DIASTOLIC BLOOD PRESSURE: 69 MMHG | TEMPERATURE: 98.6 F | HEART RATE: 87 BPM | OXYGEN SATURATION: 96 % | RESPIRATION RATE: 20 BRPM

## 2017-12-11 DIAGNOSIS — K40.90 RIGHT INGUINAL HERNIA: Primary | ICD-10-CM

## 2017-12-11 NOTE — PROGRESS NOTES
Patient here for right side groin \" bulge\". Hx of hernias before. 1. Have you been to the ER, urgent care clinic since your last visit? Hospitalized since your last visit? No    2. Have you seen or consulted any other health care providers outside of the [address] [place] [address] since your last visit? Include any pap smears or colon screening. No       Chief Complaint   Patient presents with    Groin Swelling     hernia? He is a 74 y.o. male who presents for evalution. Reviewed PmHx, RxHx, FmHx, SocHx, AllgHx and updated and dated in the chart. Patient Active Problem List    Diagnosis    Advanced care planning/counseling discussion     Has LW      Advance care planning     Pt has a LW      Osteoporosis    Pre-diabetes    Hypogonadism male    Vitamin D deficiency    Prostate CA ([facility] [address]    Hyponatremia    OCD (obsessive compulsive disorder)    Anxiety state, unspecified    Mixed hyperlipidemia    Essential hypertension, benign       Review of Systems - negative except as listed above in the HPI    Objective:     Vitals:    12/11/17 1033   BP: 110/69   Pulse: 87   Resp: 20   Temp: 98.6 °F (37 °C)   SpO2: 96%   Weight: 193 lb (87.5 kg)   Height: 5' 6\" (1.676 m)     Physical Examination: General appearance - alert, well appearing, and in no distress  Ri ing hernia, nonreduc      Assessment/ Plan:   Diagnoses and all orders for this visit:    1. Right inguinal hernia  -     REFERRAL TO GENERAL SURGERY       Follow-up Disposition:  Return if symptoms worsen or fail to improve. I have discussed the diagnosis with the patient and the intended plan as seen in the above orders. The patient understands and agrees with the plan. The patient has received an after-visit summary and questions were answered concerning future plans.      Medication Side Effects and Warnings were discussed with patient  Patient Labs were reviewed and or requested:  Patient Past Records were reviewed and or requested    Klaus Feldman M.D. There are no Patient Instructions on file for this visit.

## 2017-12-11 NOTE — MR AVS SNAPSHOT
[address] [address] 
[phone] Patient: Den Robles MRN:  IE Visit Information Date & Time Provider Department Dept. Phone Encounter #  
 2017 10:00 AM Joya Gomez MD [address] [facility] [phone] [id number] Follow-up Instructions Return if symptoms worsen or fail to improve. Upcoming Health Maintenance Date Due  
 MEDICARE YEARLY EXAM 2018 GLAUCOMA SCREENING Q2Y 2019 DTaP/Tdap/Td series (2 - Td) 2023 COLONOSCOPY 2027 Allergies as of 2017  Review Complete On: 2017 By: Joya Gomez MD  
  
 Severity Noted Reaction Type Reactions Codeine  2017    Drowsiness Niacin  2016    Rash Current Immunizations  Reviewed on 10/24/2017 Name Date Influenza High Dose Vaccine PF 10/24/2017, 10/3/2016, 2016 Influenza Vaccine 2016, 2014, 10/3/2013 Influenza Vaccine Split 10/18/2012, 2011 Pneumococcal Conjugate (PCV-13) 2016 Pneumococcal Polysaccharide (PPSV-23) 10/24/2009 Tdap 2013 ZZZ-RETIRED (DO NOT USE) Pneumococcal Vaccine (Unspecified Type) 2009 Zoster Vaccine, Live 2011 Not reviewed this visit You Were Diagnosed With   
  
 Codes Comments Right inguinal hernia    -  Primary ICD-10-CM: K40.90 ICD-9-CM: 550.90 Vitals BP Pulse Temp Resp Height(growth percentile) Weight(growth percentile) 110/69 87 98.6 °F (37 °C) 20 5' 6\" (1.676 m) 193 lb (87.5 kg) SpO2 BMI Smoking Status 96% 31.15 kg/m2 Never Smoker Vitals History BMI and BSA Data Body Mass Index Body Surface Area  
 31.15 kg/m 2 2.02 m 2 Preferred Pharmacy Pharmacy Name Phone [address] [phone] Your Updated Medication List  
  
   
This list is accurate as of: 12/11/17 11:03 AM.  Always use your most recent med list.  
  
  
  
  
 aspirin 81 mg chewable tablet Take 81 mg by mouth daily. BORON PO Take  by mouth.  
  
 calcium citrate-vitamin d3 315-200 mg-unit Tab Commonly known as:  CITRACAL+D Take 1 Tab by mouth daily (with breakfast). COQ10 (UBIQUINOL) PO Take  by mouth. glucosamine 1,000 mg Tab Take  by mouth.  
  
 loratadine 10 mg tablet Commonly known as:  Katelynn Soda Take 1 Tab by mouth daily for 360 days. Lycopene 15 mg Cap Take  by mouth.  
  
 multivitamin, stress formula tablet Commonly known as:  STRESS TAB Take 1 Tab by mouth daily. olmesartan 40 mg tablet Commonly known as:  BENICAR  
TAKE 1 TABLET BY MOUTH EVERY DAY  Indications: hypertension Omega-3-DHA-EPA-Fish Oil 1,000 mg (120 mg-180 mg) Cap Take  by mouth.  
  
 pnv w/o calcium-iron fum-fa 27-1 mg Tab Take  by mouth. POMEGRANATE PO Take  by mouth. ROBITUSSIN COUGH-CHEST WINTER DM  mg/5 mL Liqd syrup Generic drug:  dextromethorphan-guaiFENesin Take  by mouth. simvastatin 20 mg tablet Commonly known as:  ZOCOR Take 1 Tab by mouth nightly. SOY ISOFLAVONE PO Take  by mouth. VITAMIN D3 2,000 unit Tab Generic drug:  cholecalciferol (vitamin D3) Take  by mouth. We Performed the Following REFERRAL TO GENERAL SURGERY [REF27 Custom] Follow-up Instructions Return if symptoms worsen or fail to improve. Referral Information Referral ID Referred By Referred To  
  
 [id number] Franchesca GOMEZ MD   
   [address] [place] [address] Phone: [phone] Fax: [phone] Visits Status Start Date End Date 1 New Request 12/11/17 12/11/18  If your referral has a status of pending review or denied, additional information will be sent to support the outcome of this decision. Introducing [facility] & HEALTH SERVICES! Dear Gladys Arnold: 
Thank you for requesting a Istpika account. Our records indicate that you already have an active Istpika account. You can access your account anytime at [email or web address] Did you know that you can access your hospital and ER discharge instructions at any time in Istpika? You can also review all of your test results from your hospital stay or ER visit. Additional Information If you have questions, please visit the Frequently Asked Questions section of the Istpika website at [email or web address] Remember, Istpika is NOT to be used for urgent needs. For medical emergencies, dial 911. Now available from your iPhone and Android! Please provide this summary of care documentation to your next provider. Your primary care clinician is listed as MARE GOMEZ. If you have any questions after today's visit, please call [phone].

## 2017-12-12 ENCOUNTER — OFFICE VISIT (OUTPATIENT)
Dept: SURGERY | Age: 72
End: 2017-12-12

## 2017-12-12 VITALS
BODY MASS INDEX: 31.02 KG/M2 | DIASTOLIC BLOOD PRESSURE: 58 MMHG | HEIGHT: 66 IN | RESPIRATION RATE: 13 BRPM | WEIGHT: 193 LBS | HEART RATE: 85 BPM | SYSTOLIC BLOOD PRESSURE: 118 MMHG | OXYGEN SATURATION: 97 % | TEMPERATURE: 98.4 F

## 2017-12-12 DIAGNOSIS — K40.20 NON-RECURRENT BILATERAL INGUINAL HERNIA WITHOUT OBSTRUCTION OR GANGRENE: Primary | ICD-10-CM

## 2017-12-12 PROBLEM — E66.9 OBESITY (BMI 30.0-34.9): Status: ACTIVE | Noted: 2017-12-12

## 2017-12-12 NOTE — PROGRESS NOTES
1. Have you been to the ER, urgent care clinic since your last visit? Hospitalized since your last visit?no    2. Have you seen or consulted any other health care providers outside of the [address] [place] [address] since your last visit? Include any pap smears or colon screening.  no

## 2017-12-12 NOTE — PROGRESS NOTES
Surgery History and Physical    Subjective:      Shashi Dinh is a 74 y. o. white male who presents for evaluation of a right inguinal hernia. Mr. Arnetta Apgar developed a bulge in his right groin a few weeks ago after lifting. The bulge hangs down towards his scrotum and is causing discomfort. It always sticks out. He is eating fine and moving his bowels and urinating normally. He denies any previous hernia repair at this site. He denies any bulge on the left side, but was seen by Dr. Mayi Hill several years ago for a left inguinal hernia. He decided not to have a repair at that time. He has a h/o prostate cancer and has had an open prostatectomy and pelvic radiation. He has also had a primary umbilical herniorrhaphy. Past Medical History:   Diagnosis Date    Cancer [facility])     Prostate    Diabetes ([facility] [address]     NIDDM    Hypercholesterolemia     Hypertension     SIADH (syndrome of inappropriate ADH production) ([facility] [address]     Staphylococcus infection in conditions classified elsewhere and of unspecified site      Past Surgical History:   Procedure Laterality Date    COLONOSCOPY  2006    HX CHOLECYSTECTOMY      2017 Dr. [facility]      umbilical    HX PROSTATECTOMY      HX UROLOGICAL      Had scar Tissue remove from bladder      Family History   Problem Relation Age of Onset    Heart Disease Mother     Heart Disease Father     Heart Attack Father      Social History   Substance Use Topics    Smoking status: Never Smoker    Smokeless tobacco: Never Used    Alcohol use No      Prior to Admission medications    Medication Sig Start Date End Date Taking? Authorizing Provider   olmesartan (BENICAR) 40 mg tablet TAKE 1 TABLET BY MOUTH EVERY DAY  Indications: hypertension 17  Yes Johana Bamberger, NP   dextromethorphan-guaiFENesin (ROBITUSSIN COUGH-CHEST WINTER DM)  mg/5 mL liqd Take  by mouth.    Yes Historical Provider   loratadine (CLARITIN) 10 mg tablet Take 1 Tab by mouth daily for 360 days. 6/12/17 6/7/18 Yes Stephanie Nelson MD   simvastatin (ZOCOR) 20 mg tablet Take 1 Tab by mouth nightly. 4/11/17  Yes Stephanie Nelson MD   pnv w/o calcium-iron fum-fa 27-1 mg tab Take  by mouth. Yes Historical Provider   multivitamin, stress formula (STRESS TAB) tablet Take 1 Tab by mouth daily. Yes Historical Provider   Lycopene 15 mg cap Take  by mouth. Yes Historical Provider   glucosamine 1,000 mg tab Take  by mouth. Yes Historical Provider   POMEGRANATE XT/POMEGRANAT SEED (POMEGRANATE PO) Take  by mouth. Yes Historical Provider   Omega-3-DHA-EPA-Fish Oil 1,000 (120-180) mg cap Take  by mouth. Yes Historical Provider   calcium citrate-vitamin d3 (CITRACAL+D) 315-200 mg-unit tab Take 1 Tab by mouth daily (with breakfast). Yes Historical Provider   BORON PO Take  by mouth. Yes Historical Provider   COQ10, UBIQUINOL, PO Take  by mouth. Yes Historical Provider   SOY ISOFLAVONE PO Take  by mouth. Yes Historical Provider   cholecalciferol, vitamin D3, (VITAMIN D3) 2,000 unit tab Take  by mouth. Yes Historical Provider   aspirin 81 mg chewable tablet Take 81 mg by mouth daily. Yes Historical Provider      Allergies   Allergen Reactions    Codeine Drowsiness    Niacin Rash       Review of Systems:  A comprehensive review of systems was negative except for that written in the History of Present Illness. Objective:     Physical Exam:  GENERAL: alert, cooperative, no distress, appears stated age, EYE: negative findings: anicteric sclera, LYMPHATIC: Cervical, supraclavicular nodes normal. , THROAT & NECK: neck supple and symmetrical.  The thyroid is grossly normal., LUNG: clear to auscultation bilaterally, HEART: regular rate and rhythm, ABDOMEN: Soft, NT, ND. There is a healed lower midline surgical scar., GROIN: On the right, there is a reducible inguinal hernia. On the left, there is a reducible inguinal hernia. Genitalia is grossly normal.  Testicles are descended bilaterally. , EXTREMITIES:  no edema, SKIN: Normal., NEUROLOGIC: negative, PSYCHIATRIC: non focal    Assessment:     Bilateral inguinal hernias without gangrene or obstruction. Plan:     Mr. Xenia Bobby and his wife have finally decided on repairing the hernias, but would like to wait until after the new year. I had a lengthy discussion with them and answered all questions and concerns which took at least 45 minutes. I discussed the risks of the procedure including bleeding, infection, wound healing problems, recurrence of the hernias, injury to the spermatic cords or cutaneous nerves, and reaction to the prep or local and general anesthetic. They understand the risks; any and all questions were answered to their satisfaction. Because of his history, he is not a candidate for a laparoscopic repair. Mr. Xenia Bobby will be scheduled for an elective outpatient bilateral inguinal herniorrhaphies with mesh under general with LMA.     Signed By: Nicole Parikh MD     December 12, 2017

## 2017-12-29 NOTE — PERIOP NOTES
[facility]  PREOPERATIVE INSTRUCTIONS    Surgery Date:   01/04/2018      Surgery arrival time given by surgeon: NO  ([place] [facility] will call you between 3pm - 7pm the day before surgery with your arrival time. If your surgery is on a Monday, we will call you the preceding Friday. Please call [phone] after 7pm if you did not receive your arrival time.)  1. Report  to the 2nd Floor Admitting Desk on the day of your surgery. Bring your insurance card, photo identification, and any copayment (if applicable). 2. You must have a responsible adult to drive you home and stay with you the first 24 hours after surgery if you are going home the same day of your surgery. 3. Nothing to eat or drink after midnight the night before surgery. This means NO water, gum, mints, coffee, juice, etc.    4. MEDICATIONS TO TAKE THE MORNING OF SURGERY WITH A SIP OF WATER:none  5. No alcoholic beverages 24 hours before and after your surgery. 6. If you are being admitted to the hospital,please leave personal belongings/luggage in your car until you have an assigned hospital room number. ( The hospital discharge time is 12 PM NOON. Your adult  should be at the hospital prior to the noon discharge time unless otherwise instructed.)   7. STOP Aspirin and/or any non-steroidal anti-inflammatory drugs (i.e. Ibuprofen, Naproxen, Advil, Aleve) as directed by your surgeon. You may take Tylenol. Stop herbal supplements 1 week prior to  surgery. 8. If you are currently taking Plavix, Coumadin,or any other blood-thinning/ anticoagulant medication contact your surgeon for instructions. 9. Wear comfortable clothes. Wear your glasses instead of contacts. Please leave all money, jewelry and valuables at home. No make up, particularly mascara, the day of surgery. 10.  REMOVE ALL body piercings, rings,and jewelry and leave at home. Wear your hair loose or down, no pony-tails, buns, or any metal hair clips.    11. If you shower the morning of surgery, please do not apply any lotions, powders, or deodorants afterwards. Do not shave any body area within 24 hours of your surgery. 12. Please follow all instructions to avoid any potential surgical cancellation. 13. Should your physical condition change, (i.e. fever, cold, flu, etc.) please notify your surgeon as soon as possible. 14. It is important to be on time. If a situation occurs where you may be delayed, please call:  [phone]  on the day of surgery. 15. The Preadmission Testing staff can be reached at [phone]. 16. Special instructions: Free  Parking  17. The patient was contacted  via phone.  He  verbalize  understanding of all instructions does not  need reinforcem

## 2018-01-02 ENCOUNTER — OFFICE VISIT (OUTPATIENT)
Dept: FAMILY MEDICINE CLINIC | Age: 73
End: 2018-01-02

## 2018-01-02 VITALS
WEIGHT: 197 LBS | BODY MASS INDEX: 31.66 KG/M2 | SYSTOLIC BLOOD PRESSURE: 138 MMHG | HEIGHT: 66 IN | RESPIRATION RATE: 18 BRPM | DIASTOLIC BLOOD PRESSURE: 62 MMHG | TEMPERATURE: 98.3 F | HEART RATE: 104 BPM

## 2018-01-02 DIAGNOSIS — H93.8X1 EAR CONGESTION, RIGHT: Primary | ICD-10-CM

## 2018-01-02 NOTE — PATIENT INSTRUCTIONS
Cetirizine (By mouth)   Cetirizine ([id number])  Treats hay fever and allergy symptoms, hives, and itching. Brand Name(s): All Day Allergy, Children's All Day Allergy, Children's Cetirizine Hydrochloride, Children's [facility] All Day Allergy, Children's ZyrTEC, Children's ZyrTEC Allergy, Equate Allergy Relief, Equate Children's Allergy Relief, [facility] All Day Allergy, [facility] Children's All Day Allergy, Good Sense All Day Allergy, Good Sense Children's All Day Allergy, [facility] Children's All Day Allergy, [facility] Cetirizine Hydrochloride, [facility] Allergy Relief   There may be other brand names for this medicine. When This Medicine Should Not Be Used: You should not use this medicine if you have had an allergic reaction to cetirizine or hydroxyzine (Atarax®, Vistaril®). Do not give any over-the-counter (OTC) cough and cold medicine to a baby or child under 3years old. Using these medicines in very young children might cause serious or possibly life-threatening side effects. How to Use This Medicine:   Tablet, Chewable Tablet, Capsule, Liquid  · Your doctor will tell you how much medicine to use. Do not use more than directed. · You may take this medicine with or without food. · Measure the oral liquid medicine with a marked measuring spoon, oral syringe, or medicine cup. · Chew the chewable tablet thoroughly before you swallow it. You may also let the chewable tablet melt slowly in your mouth. · Swallow the tablet whole with a full glass of water. Do not chew, crush, or break it. If a dose is missed:   · Take a dose as soon as you remember. If it is almost time for your next dose, wait until then and take a regular dose. Do not take extra medicine to make up for a missed dose. How to Store and Dispose of This Medicine:   · Store the medicine in a closed container at room temperature, away from heat, moisture, and direct light.   · The liquid medicine may be kept in the refrigerator. Do not freeze. · Ask your pharmacist, doctor, or health caregiver about the best way to dispose of any outdated medicine or medicine no longer needed. · Keep all medicine out of the reach of children. Never share your medicine with anyone. Drugs and Foods to Avoid:   Ask your doctor or pharmacist before using any other medicine, including over-the-counter medicines, vitamins, and herbal products. · Make sure your doctor knows if you are also using theophylline. · Avoid drinking alcohol while using this medicine. · Make sure your doctor knows if you are also using other medicines that might make you sleepy such as cold or allergy medicines, muscle relaxants, tranquilizers, sleeping pills, or strong pain killers. Warnings While Using This Medicine:   · Talk with your doctor before taking this medicine if you are pregnant or breast feeding. · Check with your doctor before using this medicine if you have kidney disease or liver disease. · This medicine may make you dizzy or drowsy. Avoid driving, using machines, or doing anything else that could be dangerous if you are not alert. Possible Side Effects While Using This Medicine:   Call your doctor right away if you notice any of these side effects:  · Allergic reaction: Itching or hives, swelling in your face or hands, swelling or tingling in your mouth or throat, chest tightness, trouble breathing  · Skin or whites of your eyes turn yellow. If you notice these less serious side effects, talk with your doctor:   · Drowsiness or dizziness. · Dry mouth. If you notice other side effects that you think are caused by this medicine, tell your doctor. Call your doctor for medical advice about side effects. You may report side effects to FDA at 8-087-FDA-7992  © 2017 [facility] Information is for End User's use only and may not be sold, redistributed or otherwise used for commercial purposes.   The above information is an educational aid only. It is not intended as medical advice for individual conditions or treatments. Talk to your doctor, nurse or pharmacist before following any medical regimen to see if it is safe and effective for you.

## 2018-01-02 NOTE — PROGRESS NOTES
Vishal Villagomez is a 74 y.o. male   Chief Complaint   Patient presents with    Ear Fullness     right    pt with R ear congestion for pas tweekand is concerned may have a wax build up. No fever no chills. Otherwise doing well. Pt has not taken anything for this. he is a 74y.o. year old male who presents for evalution. Reviewed PmHx, RxHx, FmHx, SocHx, AllgHx and updated and dated in the chart. Review of Systems - negative except as listed above in the HPI    Objective:     Vitals:    01/02/18 1551   BP: 138/62   Pulse: (!) 104   Resp: 18   Temp: 98.3 °F (36.8 °C)   TempSrc: Oral   Weight: 197 lb (89.4 kg)   Height: 5' 6\" (1.676 m)       Current Outpatient Prescriptions   Medication Sig    olmesartan (BENICAR) 40 mg tablet TAKE 1 TABLET BY MOUTH EVERY DAY  Indications: hypertension    simvastatin (ZOCOR) 20 mg tablet Take 1 Tab by mouth nightly.  multivitamin, stress formula (STRESS TAB) tablet Take 1 Tab by mouth daily.  Lycopene 15 mg cap Take  by mouth.  glucosamine 1,000 mg tab Take 1,000 mg by mouth Before breakfast, lunch, and dinner.  POMEGRANATE XT/POMEGRANAT SEED (POMEGRANATE PO) Take  by mouth.  Omega-3-DHA-EPA-Fish Oil 1,000 (120-180) mg cap Take  by mouth.  calcium citrate-vitamin d3 (CITRACAL+D) 315-200 mg-unit tab Take 1 Tab by mouth daily (with breakfast).  BORON PO Take  by mouth.  COQ10, UBIQUINOL, PO Take  by mouth.  SOY ISOFLAVONE PO Take  by mouth.  cholecalciferol, vitamin D3, (VITAMIN D3) 2,000 unit tab Take  by mouth.  aspirin 81 mg chewable tablet Take 81 mg by mouth daily. No current facility-administered medications for this visit.         Physical Examination: General appearance - alert, well appearing, and in no distress  Eyes - pupils equal and reactive, extraocular eye movements intact  Ears - air fluid level behind R TM  Chest - clear to auscultation, no wheezes, rales or rhonchi, symmetric air entry  Heart - normal rate, regular rhythm, normal S1, S2, no murmurs, rubs, clicks or gallops      Assessment/ Plan:   Diagnoses and all orders for this visit:    1. Ear congestion, right     benadryl qhs and zyrtec daily until resolved  Follow-up Disposition:  Return if symptoms worsen or fail to improve. I have discussed the diagnosis with the patient and the intended plan as seen in the above orders. The patient has received an after-visit summary and questions were answered concerning future plans. Pt conveyed understanding of plan.     Medication Side Effects and Warnings were discussed with patient      Laura Elder, DO

## 2018-01-03 ENCOUNTER — ANESTHESIA EVENT (OUTPATIENT)
Dept: SURGERY | Age: 73
End: 2018-01-03
Payer: MEDICARE

## 2018-01-04 ENCOUNTER — ANESTHESIA (OUTPATIENT)
Dept: SURGERY | Age: 73
End: 2018-01-04
Payer: MEDICARE

## 2018-01-04 ENCOUNTER — HOSPITAL ENCOUNTER (OUTPATIENT)
Age: 73
Setting detail: OUTPATIENT SURGERY
Discharge: HOME | End: 2018-01-04
Attending: SURGERY | Admitting: SURGERY
Payer: MEDICARE

## 2018-01-04 VITALS
WEIGHT: 196 LBS | SYSTOLIC BLOOD PRESSURE: 135 MMHG | RESPIRATION RATE: 15 BRPM | HEIGHT: 66 IN | HEART RATE: 82 BPM | BODY MASS INDEX: 31.5 KG/M2 | OXYGEN SATURATION: 95 % | DIASTOLIC BLOOD PRESSURE: 56 MMHG | TEMPERATURE: 98 F

## 2018-01-04 DIAGNOSIS — K40.20 NON-RECURRENT BILATERAL INGUINAL HERNIA WITHOUT OBSTRUCTION OR GANGRENE: Primary | ICD-10-CM

## 2018-01-04 LAB
ANION GAP SERPL CALC-SCNC: 10 MMOL/L (ref 5–15)
BUN SERPL-MCNC: 17 MG/DL (ref 6–20)
BUN/CREAT SERPL: 19 (ref 12–20)
CALCIUM SERPL-MCNC: 9.2 MG/DL (ref 8.5–10.1)
CHLORIDE SERPL-SCNC: 101 MMOL/L (ref 97–108)
CO2 SERPL-SCNC: 25 MMOL/L (ref 21–32)
CREAT SERPL-MCNC: 0.91 MG/DL (ref 0.7–1.3)
GLUCOSE BLD STRIP.AUTO-MCNC: 133 MG/DL (ref 65–100)
GLUCOSE SERPL-MCNC: 111 MG/DL (ref 65–100)
POTASSIUM SERPL-SCNC: 4 MMOL/L (ref 3.5–5.1)
SERVICE CMNT-IMP: ABNORMAL
SODIUM SERPL-SCNC: 136 MMOL/L (ref 136–145)

## 2018-01-04 PROCEDURE — 77030020143 HC AIRWY LARYN INTUB CGAS -A: Performed by: ANESTHESIOLOGY

## 2018-01-04 PROCEDURE — 80048 BASIC METABOLIC PNL TOTAL CA: CPT | Performed by: ANESTHESIOLOGY

## 2018-01-04 PROCEDURE — 77030011640 HC PAD GRND REM COVD -A: Performed by: SURGERY

## 2018-01-04 PROCEDURE — 77030032490 HC SLV COMPR SCD KNE COVD -B: Performed by: SURGERY

## 2018-01-04 PROCEDURE — 76210000026 HC REC RM PH II 1 TO 1.5 HR: Performed by: SURGERY

## 2018-01-04 PROCEDURE — 36415 COLL VENOUS BLD VENIPUNCTURE: CPT | Performed by: ANESTHESIOLOGY

## 2018-01-04 PROCEDURE — 74011250636 HC RX REV CODE- 250/636

## 2018-01-04 PROCEDURE — 93005 ELECTROCARDIOGRAM TRACING: CPT

## 2018-01-04 PROCEDURE — 76010000132 HC OR TIME 2.5 TO 3 HR: Performed by: SURGERY

## 2018-01-04 PROCEDURE — 76210000006 HC OR PH I REC 0.5 TO 1 HR: Performed by: SURGERY

## 2018-01-04 PROCEDURE — 82962 GLUCOSE BLOOD TEST: CPT

## 2018-01-04 PROCEDURE — 74011250636 HC RX REV CODE- 250/636: Performed by: ANESTHESIOLOGY

## 2018-01-04 PROCEDURE — 74011250636 HC RX REV CODE- 250/636: Performed by: SURGERY

## 2018-01-04 PROCEDURE — 77030020782 HC GWN BAIR PAWS FLX 3M -B

## 2018-01-04 PROCEDURE — 74011250637 HC RX REV CODE- 250/637: Performed by: SURGERY

## 2018-01-04 PROCEDURE — 77030018836 HC SOL IRR NACL ICUM -A: Performed by: SURGERY

## 2018-01-04 PROCEDURE — C1781 MESH (IMPLANTABLE): HCPCS | Performed by: SURGERY

## 2018-01-04 PROCEDURE — 77030031139 HC SUT VCRL2 J&J -A: Performed by: SURGERY

## 2018-01-04 PROCEDURE — 74011000250 HC RX REV CODE- 250

## 2018-01-04 PROCEDURE — 77030012406 HC DRN WND PENRS BARD -A: Performed by: SURGERY

## 2018-01-04 PROCEDURE — 77030002996 HC SUT SLK J&J -A: Performed by: SURGERY

## 2018-01-04 PROCEDURE — 74011000250 HC RX REV CODE- 250: Performed by: SURGERY

## 2018-01-04 PROCEDURE — 74011000272 HC RX REV CODE- 272: Performed by: SURGERY

## 2018-01-04 PROCEDURE — 76060000036 HC ANESTHESIA 2.5 TO 3 HR: Performed by: SURGERY

## 2018-01-04 PROCEDURE — 77030002933 HC SUT MCRYL J&J -A: Performed by: SURGERY

## 2018-01-04 DEVICE — MESH HERN W7.5XL15CM INGUINAL POLYPR SYN FLAT L PORE MFIL: Type: IMPLANTABLE DEVICE | Site: INGUINAL | Status: FUNCTIONAL

## 2018-01-04 DEVICE — MESH HERN L W1.6XL1.9IN INGUINAL WHT POLYPR MFIL PLUG PTCH: Type: IMPLANTABLE DEVICE | Site: INGUINAL | Status: FUNCTIONAL

## 2018-01-04 RX ORDER — SODIUM CHLORIDE, SODIUM LACTATE, POTASSIUM CHLORIDE, CALCIUM CHLORIDE 600; 310; 30; 20 MG/100ML; MG/100ML; MG/100ML; MG/100ML
125 INJECTION, SOLUTION INTRAVENOUS CONTINUOUS
Status: DISCONTINUED | OUTPATIENT
Start: 2018-01-04 | End: 2018-01-04 | Stop reason: HOSPADM

## 2018-01-04 RX ORDER — HYDROMORPHONE HYDROCHLORIDE 2 MG/ML
.25-1 INJECTION, SOLUTION INTRAMUSCULAR; INTRAVENOUS; SUBCUTANEOUS
Status: DISCONTINUED | OUTPATIENT
Start: 2018-01-04 | End: 2018-01-04 | Stop reason: HOSPADM

## 2018-01-04 RX ORDER — OXYCODONE AND ACETAMINOPHEN 5; 325 MG/1; MG/1
2 TABLET ORAL
Status: COMPLETED | OUTPATIENT
Start: 2018-01-04 | End: 2018-01-04

## 2018-01-04 RX ORDER — BUPIVACAINE HYDROCHLORIDE 5 MG/ML
INJECTION, SOLUTION EPIDURAL; INTRACAUDAL AS NEEDED
Status: DISCONTINUED | OUTPATIENT
Start: 2018-01-04 | End: 2018-01-04 | Stop reason: HOSPADM

## 2018-01-04 RX ORDER — OXYCODONE AND ACETAMINOPHEN 5; 325 MG/1; MG/1
1 TABLET ORAL
Qty: 40 TAB | Refills: 0 | Status: SHIPPED | OUTPATIENT
Start: 2018-01-04 | End: 2018-04-25 | Stop reason: ALTCHOICE

## 2018-01-04 RX ORDER — EPHEDRINE SULFATE 50 MG/ML
INJECTION, SOLUTION INTRAVENOUS AS NEEDED
Status: DISCONTINUED | OUTPATIENT
Start: 2018-01-04 | End: 2018-01-04 | Stop reason: HOSPADM

## 2018-01-04 RX ORDER — PROPOFOL 10 MG/ML
INJECTION, EMULSION INTRAVENOUS AS NEEDED
Status: DISCONTINUED | OUTPATIENT
Start: 2018-01-04 | End: 2018-01-04 | Stop reason: HOSPADM

## 2018-01-04 RX ORDER — CEFAZOLIN SODIUM IN 0.9 % NACL 2 G/50 ML
2 INTRAVENOUS SOLUTION, PIGGYBACK (ML) INTRAVENOUS ONCE
Status: COMPLETED | OUTPATIENT
Start: 2018-01-04 | End: 2018-01-04

## 2018-01-04 RX ORDER — ONDANSETRON 2 MG/ML
INJECTION INTRAMUSCULAR; INTRAVENOUS AS NEEDED
Status: DISCONTINUED | OUTPATIENT
Start: 2018-01-04 | End: 2018-01-04 | Stop reason: HOSPADM

## 2018-01-04 RX ORDER — FLUMAZENIL 0.1 MG/ML
0.2 INJECTION INTRAVENOUS
Status: DISCONTINUED | OUTPATIENT
Start: 2018-01-04 | End: 2018-01-04 | Stop reason: HOSPADM

## 2018-01-04 RX ORDER — DIPHENHYDRAMINE HYDROCHLORIDE 50 MG/ML
12.5 INJECTION, SOLUTION INTRAMUSCULAR; INTRAVENOUS AS NEEDED
Status: DISCONTINUED | OUTPATIENT
Start: 2018-01-04 | End: 2018-01-04 | Stop reason: HOSPADM

## 2018-01-04 RX ORDER — NALOXONE HYDROCHLORIDE 0.4 MG/ML
0.2 INJECTION, SOLUTION INTRAMUSCULAR; INTRAVENOUS; SUBCUTANEOUS
Status: DISCONTINUED | OUTPATIENT
Start: 2018-01-04 | End: 2018-01-04 | Stop reason: HOSPADM

## 2018-01-04 RX ORDER — LIDOCAINE HYDROCHLORIDE 20 MG/ML
INJECTION, SOLUTION EPIDURAL; INFILTRATION; INTRACAUDAL; PERINEURAL AS NEEDED
Status: DISCONTINUED | OUTPATIENT
Start: 2018-01-04 | End: 2018-01-04 | Stop reason: HOSPADM

## 2018-01-04 RX ORDER — FENTANYL CITRATE 50 UG/ML
INJECTION, SOLUTION INTRAMUSCULAR; INTRAVENOUS AS NEEDED
Status: DISCONTINUED | OUTPATIENT
Start: 2018-01-04 | End: 2018-01-04 | Stop reason: HOSPADM

## 2018-01-04 RX ORDER — LIDOCAINE HYDROCHLORIDE 10 MG/ML
0.1 INJECTION, SOLUTION EPIDURAL; INFILTRATION; INTRACAUDAL; PERINEURAL AS NEEDED
Status: DISCONTINUED | OUTPATIENT
Start: 2018-01-04 | End: 2018-01-04 | Stop reason: HOSPADM

## 2018-01-04 RX ORDER — MIDAZOLAM HYDROCHLORIDE 1 MG/ML
2 INJECTION, SOLUTION INTRAMUSCULAR; INTRAVENOUS
Status: DISCONTINUED | OUTPATIENT
Start: 2018-01-04 | End: 2018-01-04 | Stop reason: HOSPADM

## 2018-01-04 RX ORDER — MIDAZOLAM HYDROCHLORIDE 1 MG/ML
INJECTION, SOLUTION INTRAMUSCULAR; INTRAVENOUS AS NEEDED
Status: DISCONTINUED | OUTPATIENT
Start: 2018-01-04 | End: 2018-01-04 | Stop reason: HOSPADM

## 2018-01-04 RX ADMIN — FENTANYL CITRATE 25 MCG: 50 INJECTION, SOLUTION INTRAMUSCULAR; INTRAVENOUS at 08:45

## 2018-01-04 RX ADMIN — MIDAZOLAM HYDROCHLORIDE 2 MG: 1 INJECTION, SOLUTION INTRAMUSCULAR; INTRAVENOUS at 07:57

## 2018-01-04 RX ADMIN — FENTANYL CITRATE 25 MCG: 50 INJECTION, SOLUTION INTRAMUSCULAR; INTRAVENOUS at 10:20

## 2018-01-04 RX ADMIN — EPHEDRINE SULFATE 10 MG: 50 INJECTION, SOLUTION INTRAVENOUS at 08:31

## 2018-01-04 RX ADMIN — ONDANSETRON 4 MG: 2 INJECTION INTRAMUSCULAR; INTRAVENOUS at 08:12

## 2018-01-04 RX ADMIN — FENTANYL CITRATE 50 MCG: 50 INJECTION, SOLUTION INTRAMUSCULAR; INTRAVENOUS at 07:59

## 2018-01-04 RX ADMIN — PROPOFOL 150 MG: 10 INJECTION, EMULSION INTRAVENOUS at 08:04

## 2018-01-04 RX ADMIN — FENTANYL CITRATE 25 MCG: 50 INJECTION, SOLUTION INTRAMUSCULAR; INTRAVENOUS at 10:01

## 2018-01-04 RX ADMIN — FENTANYL CITRATE 50 MCG: 50 INJECTION, SOLUTION INTRAMUSCULAR; INTRAVENOUS at 10:32

## 2018-01-04 RX ADMIN — OXYCODONE HYDROCHLORIDE AND ACETAMINOPHEN 2 TABLET: 5; 325 TABLET ORAL at 11:50

## 2018-01-04 RX ADMIN — SODIUM CHLORIDE, SODIUM LACTATE, POTASSIUM CHLORIDE, AND CALCIUM CHLORIDE 125 ML/HR: 600; 310; 30; 20 INJECTION, SOLUTION INTRAVENOUS at 07:20

## 2018-01-04 RX ADMIN — EPHEDRINE SULFATE 5 MG: 50 INJECTION, SOLUTION INTRAVENOUS at 09:16

## 2018-01-04 RX ADMIN — CEFAZOLIN 2 G: 1 INJECTION, POWDER, FOR SOLUTION INTRAMUSCULAR; INTRAVENOUS; PARENTERAL at 08:06

## 2018-01-04 RX ADMIN — LIDOCAINE HYDROCHLORIDE 60 MG: 20 INJECTION, SOLUTION EPIDURAL; INFILTRATION; INTRACAUDAL; PERINEURAL at 08:04

## 2018-01-04 RX ADMIN — FENTANYL CITRATE 25 MCG: 50 INJECTION, SOLUTION INTRAMUSCULAR; INTRAVENOUS at 09:28

## 2018-01-04 NOTE — INTERVAL H&P NOTE
H&P Update:  Yan Dickson was seen and examined. History and physical has been reviewed. The patient has been examined.  There have been no significant clinical changes since the completion of the originally dated History and Physical.    Signed By: Minesh Talley MD     January 4, 2018 7:55 AM

## 2018-01-04 NOTE — OP NOTES
Daniela Merrill    MRN:  [id number]    Date of procedure:  1/4/2018    Surgeon:  Sherrill Ferrara MD.    Assistant:  Onofre Langley. Anesthesia:  1. General with LMA. 2. 0.5% Marcaine. Preoperative Diagnosis:  1. Right inguinal hernia. 2. Left inguinal hernia. Postoperative Diagnosis:  1. Incarcerated, indirect right inguinal hernia. 2. Incarcerated, indirect left inguinal hernia. 3. Direct left inguinal hernia. Procedures:  1. Right inguinal herniorrhaphy with Bard Perfix Plug mesh (Large). 2. Left inguinal herniorrhaphy with Bard Perfix Plug (Large) and Soft Bard mesh patch. Indication:  Daniela Merrill is a 73 yo white gentleman with a bulge in both groin which are getting bigger, and causing discomfort. He now presents for his elective repair. Procedure Details: The patient was seen preoperatively in the holding area. The risks, benefits, and expected outcomes of the procedure were discussed with the patient, and all questions were answered satisfactorily. The patient concurred with the proposed plan, giving informed consent. The patient was taken to the operating room. The patient was identified as Daniela Merrill and the procedure verified as Bilateral Inguinal Herniorrhaphies with Mesh. The patient was placed on the OR table in the supine position. Prior to induction, antibiotic prophylaxis was administered. General anesthesia was administered and tolerated well. Both groin were shaved and then prepped with Chlorprep and draped in the usual sterile fashion. A Time Out was performed, and the above information was confirmed. Attention was turned first to the right side. Using the anterior superior iliac spine and pubic tubercle as natural landmarks, the line of incision was marked. An incision was made in the natural crease of the skin. Dissection was taken down through Chaparrita's fascia to the level of the external oblique aponeurosis.   The aponeurosis was cleaned off superficially. Using a 15 blade, an incision was made in the aponeurosis in the direction of its fibers. The incision was extended superiorly and then inferiorly to include the external inguinal ring. The ilioinguinal nerve was identified and carefully preserved. The spermatic cord was encircled with a Penrose drain at the level of the pubic tubercle. Attention was turned to the anteromedial aspect of the spermatic cord. The spermatic cord was skeletonized. A large incarcerated indirect hernia sac was discovered. The hernia sac was carefully dissected out to the internal inguinal ring and reduced. The floor of the inguinal canal was intact. A large Bard Perfix Plug mesh was chosen for the repair and soaked on the back table in Bacitracin solution. The plug was placed into the internal inguinal ring after reducing the sac and secured with interrupted 3-0 Vicryl. The patch was carefully tailored to the floor of the canal.  It was then secured to the pubic tubercle inferiorly, the conjoined tendon medially, the shelving margin of the inguinal ligament laterally, and around the spermatic cord to the internal oblique muscle superiorly with interrupted 3-0 Vicryl. The wound was irrigated with saline. The external oblique was closed with a running 2-0 Vicryl, recreating the external inguinal ring. The wound was once again irrigated. Chaparrita's was approximated with interrupted 3-0 Vicryl. The wound was covered with a moist sponge. Attention was then turned to the left side. Using the anterior superior iliac spine and pubic tubercle as natural landmarks, the line of incision was marked. The skin and subcutaneous tissue were infiltrated with the local anesthetic. An incision was made in the natural crease of the skin. Dissection was taken down through Chaparrita's fascia to the level of the external oblique aponeurosis. The aponeurosis was cleaned off superficially.   Using a 15 blade, an incision was made in the aponeurosis in the direction of its fibers. The incision was extended superiorly and then inferiorly to include the external inguinal ring. The ilioinguinal nerve was identified and carefully preserved. The spermatic cord was encircled with a Penrose drain at the level of the pubic tubercle. Attention was turned to the anteromedial aspect of the spermatic cord. The spermatic cord was skeletonized. A large incarcerated indirect hernia sac was discovered. The hernia sac was carefully dissected out to the internal inguinal ring and reduced. The floor of the inguinal canal had a large defect, but no direct hernia sac was present. A large Bard Perfix Plug mesh was chosen for the repair and soaked on the back table in Bacitracin solution. The plug was placed into the internal inguinal ring after reducing the sac and secured with interrupted 3-0 Vicryl. Because the defect in the floor was so large, a Soft Bard mesh was carefully tailored to the floor of the canal.  It was then secured to the pubic tubercle inferiorly, the conjoined tendon medially, the shelving margin of the inguinal ligament laterally, and around the spermatic cord to the internal oblique muscle superiorly with interrupted 3-0 Vicryl. The wound was irrigated with saline. The external oblique was closed with a running 2-0 Vicryl, recreating the external inguinal ring. The wound was once again irrigated. Chaparrita's was approximated with interrupted 3-0 Vicryl. The skin and subcutaneous tissue on both sides were infiltrated with the local anesthetic. The skin on both sides was closed with 4-0 Monocryl in the usual running subcuticular fashion. The wounds were cleaned and dried, and steri-strips and a sterile dressings were applied. Both testicles were pulled down in the usual standard fashion. Anesthesial was then reversed. Estimated Blood Loss:  Less than 25 ml. Vivi Vidales     Specimen:  * No specimens in log *    Implant:    Implant Name Type Inv. Item Serial No.  Lot No. LRB No. Used Action   MESH MERCEDES PLG LG 1.6X1. 9IN --  - SNA  MESH MERCEDES PLG LG 1.6X1. 9IN --  NA BARD DAVOL [id number] Right 1 Implanted   MESH [employer] LG 1.6X1. 9IN --  - SNA  MESH MER[employer]EDES PLG LG 1.6X1. 9IN --  NA BARD DAVOL [id number] Left 1 Implanted   MESH MERCEDES FLAT SHT 7.5X15CM --  - SNA   MESH MERCEDES FLAT SHT 7.5X15CM --  NA BARD DAVOL [id number] Left 1 Implanted       Findings:  1. Incarcerated indirect right inguinal hernia sac. 2. Incarcerated indirect left inguinal hernia sac. 3. Defect in the floor of the left inguinal canal.    Counts: All sponge, needle, and instruments were correct x 2. Complications:  None. Disposition:  The patient was transferred to the recovery room in stable condition, having tolerated the procedure and anesthesia well.       Signed by:  Brittni Yo MD           January 4, 2018        Selma Bang MD

## 2018-01-04 NOTE — IP AVS SNAPSHOT
[address] 
[phone] Patient: Cameron Robison MRN: [id number]:[id number] A check lauren indicates which time of day the medication should be taken. My Medications START taking these medications Instructions Each Dose to Equal  
 Morning Noon Evening Bedtime  
 oxyCODONE-acetaminophen 5-325 mg per tablet Commonly known as:  PERCOCET Your last dose was: Your next dose is: Take 1 Tab by mouth every four (4) hours as needed for Pain. Max Daily Amount: 6 Tabs. 1 Tab CONTINUE taking these medications Instructions Each Dose to Equal  
 Morning Noon Evening Bedtime  
 aspirin 81 mg chewable tablet Your last dose was: Your next dose is: Take 81 mg by mouth daily. 81 mg  
    
   
   
   
  
 BORON PO Your last dose was: Your next dose is: Take  by mouth.  
     
   
   
   
  
 calcium citrate-vitamin d3 315-200 mg-unit Tab Commonly known as:  CITRACAL+D Your last dose was: Your next dose is: Take 1 Tab by mouth daily (with breakfast). 1 Tab  
    
   
   
   
  
 COQ10 (UBIQUINOL) PO Your last dose was: Your next dose is: Take  by mouth. glucosamine 1,000 mg Tab Your last dose was: Your next dose is: Take 1,000 mg by mouth Before breakfast, lunch, and dinner. 1000 mg Lycopene 15 mg Cap Your last dose was: Your next dose is: Take  by mouth.  
     
   
   
   
  
 multivitamin, stress formula tablet Commonly known as:  STRESS TAB Your last dose was: Your next dose is: Take 1 Tab by mouth daily. 1 Tab  
    
   
   
   
  
 olmesartan 40 mg tablet Commonly known as:  [employer]  
   
 Your last dose was: Your next dose is: TAKE 1 TABLET BY MOUTH EVERY DAY  Indications: hypertension Omega-3-DHA-EPA-Fish Oil 1,000 mg (120 mg-180 mg) Cap Your last dose was: Your next dose is: Take  by mouth. POMEGRANATE PO Your last dose was: Your next dose is: Take  by mouth. simvastatin 20 mg tablet Commonly known as:  ZOCOR Your last dose was: Your next dose is: Take 1 Tab by mouth nightly. 20 mg  
    
   
   
   
  
 SOY ISOFLAVONE PO Your last dose was: Your next dose is: Take  by mouth. VITAMIN D3 2,000 unit Tab Generic drug:  cholecalciferol (vitamin D3) Your last dose was: Your next dose is: Take  by mouth. Where to Get Your Medications Information on where to get these meds will be given to you by the nurse or doctor. ! Ask your nurse or doctor about these medications  
  oxyCODONE-acetaminophen 5-325 mg per tablet

## 2018-01-04 NOTE — DISCHARGE INSTRUCTIONS
Hernia Repair: What to Expect at [address] [place] [address] are likely to have pain for the next few days. You may also feel like you have the flu, and you may have a low fever and feel tired and nauseated. This is common. You should feel better after a few days and will probably feel much better in 7 days. For several weeks you may feel twinges or pulling in the hernia repair when you move. You may have some bruising on the scrotum and along the penis. This is normal.  Men will need to wear a jockstrap or briefs, not boxers, for scrotal support for several days after a groin (inguinal) hernia repair. GlassesOffdex bicycle shorts may provide good support. This care sheet gives you a general idea about how long it will take for you to recover, but each person recovers at a different pace. Follow the steps below to get better as quickly as possible. How can you care for yourself at home? Activity  ? · Rest when you feel tired. Getting enough sleep will help you recover. ? · Try to walk each day. Start by walking a little more than you did the day before. Bit by bit, increase the amount you walk. Walking boosts blood flow and helps prevent pneumonia and constipation. ? · Avoid strenuous activities, such as biking, jogging, weight lifting, or aerobic exercise, until your doctor says it is okay. ? · Avoid lifting anything over 20 pounds or that would make you strain for 6 weeks! This may include heavy grocery bags and milk containers, a heavy briefcase or backpack, cat litter or dog food bags, a vacuum , or a child. ? · You may drive when you are no longer taking pain medicine and can quickly move your foot from the gas pedal to the brake. You must also be able to sit comfortably for a long period of time, even if you do not plan to go far because you might get caught in traffic. ? · Most people are able to return to work within 1 to 2 weeks after surgery.    ? · You may shower 24 hours after surgery, if your doctor okays it. Pat the cuts (incisions) dry. Do not take a bath for the first 2 weeks, and until after seen by your doctor. ? ·    Diet  ? · You can eat your normal diet. If your stomach is upset, try bland, low-fat foods like plain rice, broiled chicken, toast, and yogurt. ? · Drink plenty of fluids (unless your doctor tells you not to). ? · You may notice that your bowel movements are not regular right after your surgery. This is common. Avoid constipation and straining with bowel movements. You may want to take a fiber supplement every day. If you have not had a bowel movement after a couple of days, ask your doctor about taking a mild laxative. Medicines  ? · You can restart your medicines. Your doctor will also give you instructions about taking any new medicines. ? · If you take blood thinners, such as warfarin (Coumadin), be sure to talk to your doctor. Your doctor will tell you if and when to start taking those medicines again. Make sure that you understand exactly what your doctor wants you to do. ? · Be safe with medicines. Take pain medicines exactly as directed. ¨ If the doctor gave you a prescription medicine for pain, take it as prescribed. ¨ If you are not taking a prescription pain medicine, take an over-the-counter medicine such as acetaminophen (Tylenol), ibuprofen (Advil, Motrin), or naproxen (Aleve). Read and follow all instructions on the label. ¨ Do not take two or more pain medicines at the same time unless the doctor told you to. Many pain medicines have acetaminophen, which is Tylenol. Too much acetaminophen (Tylenol) can be harmful. ? ·    ? · If you think your pain medicine is making you sick to your stomach:  ¨ Take your medicine after meals (unless your doctor has told you not to). ¨ Ask your doctor for a different pain medicine. Incision care  ? · Remove your bandages on Saturday, 1/6. You may leave your incisions uncovered.   · If you have strips of tape on the cut (incision) the doctor made, leave the tape on for a week or until it falls off.   ? · Keep the incisions clean and dry. ? · Wash the area daily with warm, soapy water and pat it dry. Follow-up care is a key part of your treatment and safety. Be sure to make and go to all appointments, and call your doctor if you are having problems. It's also a good idea to know your test results and keep a list of the medicines you take. When should you call for help? Call 911 anytime you think you may need emergency care. For example, call if:  ? · You passed out (lost consciousness). ? · You are short of breath. ?Call your doctor now or seek immediate medical care if:  ? · You have abdominal pain that does not get better after you take pain medicine. ? · You are sick to your stomach and cannot keep fluids down. ? · You have signs of a blood clot in your leg (called a deep vein thrombosis), such as:  ¨ Pain in your calf, back of the knee, thigh, or groin. ¨ Redness and swelling in your leg or groin. ? · You cannot pass stool, gas, or urine. ? · Bright red blood has soaked through the bandage over your incision. ? · You have loose stitches, or your incision comes open. ? · You have signs of infection, such as:  ¨ Increased pain, swelling, warmth, or redness. ¨ Red streaks leading from the incision. ¨ Pus draining from the incision. ¨ A fever > 101. ? Watch closely for any changes in your health, and be sure to contact your doctor if you have any problems. Where can you learn more? Go to http://kate-gaby.info/. Enter W598 in the search box to learn more about \"Hernia Repair: What to Expect at Home. \"  Current as of: May 12, 2017  Content Version: 11.4  © 3938-0592 [email or web address]. Care instructions adapted under license by [employer] (which disclaims liability or warranty for this information).  If you have questions about a medical condition or this instruction, always ask your healthcare professional. [place] [address] any warranty or liability for your use of this information. Layla Mon. Cait Patel MD, FACS  General Surgery at [address] [place] [address] Carlos Hartmann, [address]  [phone]  Fax [phone]    DISCHARGE SUMMARY from your Nurse    The following personal items collected during your admission are returned to you:   Dental Appliance: Dental Appliances: None  Vision: Visual Aid: Glasses  Hearing Aid:    Jewelry: Jewelry: None  Clothing: Clothing: Footwear, Pants, Shirt, Jacket/Coat, Socks, Undergarments  Other Valuables: Other Valuables: None  Valuables sent to safe:      PATIENT INSTRUCTIONS:    After general anesthesia or intravenous sedation, for 24 hours or while taking prescription Narcotics:  · Limit your activities  · Do not drive and operate hazardous machinery  · Do not make important personal or business decisions  · Do  not drink alcoholic beverages  · If you have not urinated within 8 hours after discharge, please contact your surgeon on call. Report the following to your surgeon:  · Excessive pain, swelling, redness or odor of or around the surgical area  · Temperature over 100.5  · Nausea and vomiting lasting longer than 4 hours or if unable to take medications  · Any signs of decreased circulation or nerve impairment to extremity: change in color, persistent  numbness, tingling, coldness or increase pain  · Any questions    COUGH AND DEEP BREATHE    Breathing deep and coughing are very important exercises to do after surgery. Deep breathing and coughing open the little air tubes and air sacks in your lungs. You take deep breaths every day. You may not even notice - it is just something you do when you sigh or yawn. It is a natural exercise you do to keep these air passages open.      After surgery, take deep breaths and cough, on purpose. Coughing and deep breathing help prevent bronchitis and pneumonia after surgery. If you had chest or belly surgery, use a pillow as a \"hug anat\" and hold it tightly to your chest or belly when you cough. DIRECTIONS:  6. Take 10 to 15 slow deep breaths every hour while awake. 7. Breathe in deeply, and hold it for 2 seconds. 8. Exhale slowly through puckered lips, like blowing up a balloon. 9. After every 4th or 5th deep breath, hug your pillow to your chest or belly and give a hard, deep cough. Yes, it will probably hurt. But doing this exercise is very important part of healing after surgery. Take your pain medicine to help you do this exercise without too much pain. IF YOU HAVE BEEN DIAGNOSED WITH SLEEP APNEA, PLEASE USE YOUR SLEEP APNEA DEVICE OR CPAP MACHINE WHEN YOU INTEND TO NAP AFTER TAKING PAIN MEDICATION. Ankle Pumps    Ankle pumps increase the circulation of oxygenated blood to your lower extremities and decrease your risk for circulation problems such as blood clots. They also stretch the muscles, tendons and ligaments in your foot and ankle, and prevent joint contracture in the ankle and foot, especially after surgeries on the legs. It is important to do ankle pump exercises regularly after surgery because immobility increases your risk for developing a blood clot. Your doctor may also have you take an Aspirin for the next few days as well. If your doctor did not ask you to take an Aspirin, consult with him before starting Aspirin therapy on your own. Slowly point your foot forward, feeling the muscles on the top of your lower leg stretch, and hold this position for 5 seconds. Next, pull your foot back toward you as far as possible, stretching the calf muscles, and hold that position for 5 seconds. Repeat with the other foot.   Perform 10 repetitions every hour while awake for both ankles if possible (down and then up with the foot once is one repetition). You should feel gentle stretching of the muscles in your lower leg when doing this exercise. If you feel pain, or your range of motion is limited, don't  Push too hard. Only go the limit your joint and muscles will let you go. If you have increasing pain, progressively worsening leg warmth or swelling, STOP the exercise and call your doctor. Below is information about the medications your doctor is prescribing after your visit:    Other information in your discharge envelope:  []     PRESCRIPTIONS  []     PHYSICAL THERAPY PRESCRIPTION  []     APPOINTMENT CARDS  []     Regional Anesthesia Pamphlet for block or block with On-Q Catheter from Anesthesia Service  []     Medical device information sheets/pamphlets from their    []     School/work excuse note. []     /parent work excuse note. These are general instructions for a healthy lifestyle:    *  Please give a list of your current medications to your Primary Care Provider. *  Please update this list whenever your medications are discontinued, doses are      changed, or new medications (including over-the-counter products) are added. *  Please carry medication information at all times in case of emergency situations. About Smoking  No smoking / No tobacco products / Avoid exposure to second hand smoke    Surgeon General's Warning:  Quitting smoking now greatly reduces serious risk to your health. Obesity, smoking, and sedentary lifestyle greatly increases your risk for illness and disease. A healthy diet, regular physical exercise & weight monitoring are important for maintaining a healthy lifestyle.     Congestive Heart Failure  You may be retaining fluid if you have a history of heart failure or if you experience any of the following symptoms:  Weight gain of 3 pounds or more overnight or 5 pounds in a week, increased swelling in our hands or feet or shortness of breath while lying flat in bed. Please call your doctor as soon as you notice any of these symptoms; do not wait until your next office visit. Recognize signs and symptoms of STROKE:  F - face looks uneven  A - arms unable to move or move even  S - speech slurred or non-existent  T - time-call 911 as soon as signs and symptoms begin-DO NOT go         Back to bed or wait to see if you get better-TIME IS BRAIN. Warning signs of HEART ATTACK  Call 911 if you have these symptoms    · Chest discomfort. Most heart attacks involve discomfort in the center of the chest that lasts more than a few minutes, or that goes away and comes back. It can feel like uncomfortable pressure, squeezing, fullness, or pain. · Discomfort in other areas of the upper body. Symptoms can include pain or discomfort in one or both        Arms, the back, neck, jaw, or stomach. ·  Shortness of breath with or without chest discomfort. · Other signs may include breaking out in a cold sweat, nausea, or lightheadedness    Don't wait more than five minutes to call 911 - MINUTES MATTER! Fast action can save your life. Calling 911 is almost always the fastest way to get lifesaving treatment. Emergency Medical Services staff can begin treatment when they arrive - up to an hour sooner than if someone gets to the hospital by car. JOSE MANUEL MARSHALL MEDICATION AND SIDE EFFECT GUIDE    The Mamadou Weiss MEDICATION AND SIDE EFFECT GUIDE was provided to the PATIENT AND CARE PROVIDER.   Information provided includes instruction about drug purpose and common side effects for the following medications:               Percocet

## 2018-01-04 NOTE — IP AVS SNAPSHOT
[address] [facility] 
 
 
 [address] [facility] 100[address] [facility] 
[phone] Patient: Sunni Bryant MRN: [id number] About your hospitalization You were admitted on:  January 4, 2018 You last received care in the:  [facility] PACU You were discharged on:  January 4, 2018 Why you were hospitalized Your primary diagnosis was:  Not on File Follow-up Information Follow up With Details Comments Contact Info Tammy Angulo MD   [address] [place] [id number] [facility] [address] [phone] Your Scheduled Appointments Wednesday January 17, 2018  1:20 PM EST  
POST OP with Nicolette Winters NP [facility] ([address]) [address] [facility] [address]  
[phone] Discharge Orders None A check lauren indicates which time of day the medication should be taken. My Medications START taking these medications Instructions Each Dose to Equal  
 Morning Noon Evening Bedtime  
 oxyCODONE-acetaminophen 5-325 mg per tablet Commonly known as:  PERCOCET Your last dose was: Your next dose is: Take 1 Tab by mouth every four (4) hours as needed for Pain. Max Daily Amount: 6 Tabs. 1 Tab CONTINUE taking these medications Instructions Each Dose to Equal  
 Morning Noon Evening Bedtime  
 aspirin 81 mg chewable tablet Your last dose was: Your next dose is: Take 81 mg by mouth daily. 81 mg  
    
   
   
   
  
 BORON PO Your last dose was: Your next dose is: Take  by mouth.  
     
   
   
   
  
 calcium citrate-vitamin d3 315-200 mg-unit Tab Commonly known as:  CITRACAL+D Your last dose was: Your next dose is: Take 1 Tab by mouth daily (with breakfast). 1 Tab  
    
   
   
   
  
 COQ10 (UBIQUINOL) PO Your last dose was: Your next dose is: Take  by mouth. glucosamine 1,000 mg Tab Your last dose was: Your next dose is: Take 1,000 mg by mouth Before breakfast, lunch, and dinner. 1000 mg Lycopene 15 mg Cap Your last dose was: Your next dose is: Take  by mouth.  
     
   
   
   
  
 multivitamin, stress formula tablet Commonly known as:  STRESS TAB Your last dose was: Your next dose is: Take 1 Tab by mouth daily. 1 Tab  
    
   
   
   
  
 olmesartan 40 mg tablet Commonly known as:  [employer] Your last dose was: Your next dose is: TAKE 1 TABLET BY MOUTH EVERY DAY  Indications: hypertension Omega-3-DHA-EPA-Fish Oil 1,000 mg (120 mg-180 mg) Cap Your last dose was: Your next dose is: Take  by mouth. POMEGRANATE PO Your last dose was: Your next dose is: Take  by mouth. simvastatin 20 mg tablet Commonly known as:  ZOCOR Your last dose was: Your next dose is: Take 1 Tab by mouth nightly. 20 mg  
    
   
   
   
  
 SOY ISOFLAVONE PO Your last dose was: Your next dose is: Take  by mouth. VITAMIN D3 2,000 unit Tab Generic drug:  cholecalciferol (vitamin D3) Your last dose was: Your next dose is: Take  by mouth. Where to Get Your Medications Information on where to get these meds will be given to you by the nurse or doctor. ! Ask your nurse or doctor about these medications  
  oxyCODONE-acetaminophen 5-325 mg per tablet Discharge Instructions Hernia Repair: What to Expect at Home Your Recovery You are likely to have pain for the next few days. You may also feel like you have the flu, and you may have a low fever and feel tired and nauseated. This is common. You should feel better after a few days and will probably feel much better in 7 days. For several weeks you may feel twinges or pulling in the hernia repair when you move. You may have some bruising on the scrotum and along the penis. This is normal.  Men will need to wear a jockstrap or briefs, not boxers, for scrotal support for several days after a groin (inguinal) hernia repair. Vhoto bicycle shorts may provide good support. This care sheet gives you a general idea about how long it will take for you to recover, but each person recovers at a different pace. Follow the steps below to get better as quickly as possible. How can you care for yourself at home? Activity ? · Rest when you feel tired. Getting enough sleep will help you recover. ? · Try to walk each day. Start by walking a little more than you did the day before. Bit by bit, increase the amount you walk. Walking boosts blood flow and helps prevent pneumonia and constipation. ? · Avoid strenuous activities, such as biking, jogging, weight lifting, or aerobic exercise, until your doctor says it is okay. ? · Avoid lifting anything over 20 pounds or that would make you strain for 6 weeks! This may include heavy grocery bags and milk containers, a heavy briefcase or backpack, cat litter or dog food bags, a vacuum , or a child. ? · You may drive when you are no longer taking pain medicine and can quickly move your foot from the gas pedal to the brake. You must also be able to sit comfortably for a long period of time, even if you do not plan to go far because you might get caught in traffic. ? · Most people are able to return to work within 1 to 2 weeks after surgery. ? · You may shower 24 hours after surgery, if your doctor okays it.   Erwin Maria the cuts (incisions) dry. Do not take a bath for the first 2 weeks, and until after seen by your doctor. ? · Diet ? · You can eat your normal diet. If your stomach is upset, try bland, low-fat foods like plain rice, broiled chicken, toast, and yogurt. ? · Drink plenty of fluids (unless your doctor tells you not to). ? · You may notice that your bowel movements are not regular right after your surgery. This is common. Avoid constipation and straining with bowel movements. You may want to take a fiber supplement every day. If you have not had a bowel movement after a couple of days, ask your doctor about taking a mild laxative. Medicines ? · You can restart your medicines. Your doctor will also give you instructions about taking any new medicines. ? · If you take blood thinners, such as warfarin (Coumadin), be sure to talk to your doctor. Your doctor will tell you if and when to start taking those medicines again. Make sure that you understand exactly what your doctor wants you to do. ? · Be safe with medicines. Take pain medicines exactly as directed. ¨ If the doctor gave you a prescription medicine for pain, take it as prescribed. ¨ If you are not taking a prescription pain medicine, take an over-the-counter medicine such as acetaminophen (Tylenol), ibuprofen (Advil, Motrin), or naproxen (Aleve). Read and follow all instructions on the label. ¨ Do not take two or more pain medicines at the same time unless the doctor told you to. Many pain medicines have acetaminophen, which is Tylenol. Too much acetaminophen (Tylenol) can be harmful. ? ·   
? · If you think your pain medicine is making you sick to your stomach: 
¨ Take your medicine after meals (unless your doctor has told you not to). ¨ Ask your doctor for a different pain medicine. Incision care ? · Remove your bandages on Saturday, 1/6. You may leave your incisions uncovered. · If you have strips of tape on the cut (incision) the doctor made, leave the tape on for a week or until it falls off.  
? · Keep the incisions clean and dry. ? · Wash the area daily with warm, soapy water and pat it dry. Follow-up care is a key part of your treatment and safety. Be sure to make and go to all appointments, and call your doctor if you are having problems. It's also a good idea to know your test results and keep a list of the medicines you take. When should you call for help? Call 911 anytime you think you may need emergency care. For example, call if: 
? · You passed out (lost consciousness). ? · You are short of breath. ?Call your doctor now or seek immediate medical care if: 
? · You have abdominal pain that does not get better after you take pain medicine. ? · You are sick to your stomach and cannot keep fluids down. ? · You have signs of a blood clot in your leg (called a deep vein thrombosis), such as: 
¨ Pain in your calf, back of the knee, thigh, or groin. ¨ Redness and swelling in your leg or groin. ? · You cannot pass stool, gas, or urine. ? · Bright red blood has soaked through the bandage over your incision. ? · You have loose stitches, or your incision comes open. ? · You have signs of infection, such as: 
¨ Increased pain, swelling, warmth, or redness. ¨ Red streaks leading from the incision. ¨ Pus draining from the incision. ¨ A fever > 101. ? Watch closely for any changes in your health, and be sure to contact your doctor if you have any problems. Where can you learn more? Go to http://kate-gaby.info/. Enter M770 in the search box to learn more about \"Hernia Repair: What to Expect at Home. \" Current as of: May 12, 2017 Content Version: 11.4 © 0688-0206 Healthwise, Incorporated.  Care instructions adapted under license by [employer] (which disclaims liability or warranty for this information). If you have questions about a medical condition or this instruction, always ask your healthcare professional. [place] [address] any warranty or liability for your use of this information. Maximilian Vaughn. Chandan Colon MD, FACS General Surgery at [address], [address] [place], [address] [place] [address] [address] 
[phone] Fax [phone] DISCHARGE SUMMARY from your Nurse The following personal items collected during your admission are returned to you:  
Dental Appliance: Dental Appliances: None Vision: Visual Aid: Glasses Hearing Aid:   
Jewelry: Jewelry: None Clothing: Clothing: Footwear, Pants, Shirt, Jacket/Coat, Socks, Undergarments Other Valuables: Other Valuables: None Valuables sent to safe:   
 
PATIENT INSTRUCTIONS: 
 
After general anesthesia or intravenous sedation, for 24 hours or while taking prescription Narcotics: · Limit your activities · Do not drive and operate hazardous machinery · Do not make important personal or business decisions · Do  not drink alcoholic beverages · If you have not urinated within 8 hours after discharge, please contact your surgeon on call. Report the following to your surgeon: 
· Excessive pain, swelling, redness or odor of or around the surgical area · Temperature over 100.5 · Nausea and vomiting lasting longer than 4 hours or if unable to take medications · Any signs of decreased circulation or nerve impairment to extremity: change in color, persistent  numbness, tingling, coldness or increase pain · Any questions [address] Breathing deep and coughing are very important exercises to do after surgery. Deep breathing and coughing open the little air tubes and air sacks in your lungs. You take deep breaths every day. You may not even notice - it is just something you do when you sigh or yawn.   It is a natural exercise you do to keep these air passages open. After surgery, take deep breaths and cough, on purpose. Coughing and deep breathing help prevent bronchitis and pneumonia after surgery. If you had chest or belly surgery, use a pillow as a \"hug anat\" and hold it tightly to your chest or belly when you cough. DIRECTIONS: 
6. Take 10 to 15 slow deep breaths every hour while awake. 7. Breathe in deeply, and hold it for 2 seconds. 8. Exhale slowly through puckered lips, like blowing up a balloon. 9. After every 4th or 5th deep breath, hug your pillow to your chest or belly and give a hard, deep cough. Yes, it will probably hurt. But doing this exercise is very important part of healing after surgery. Take your pain medicine to help you do this exercise without too much pain. IF YOU HAVE BEEN DIAGNOSED WITH SLEEP APNEA, PLEASE USE YOUR SLEEP APNEA DEVICE OR CPAP MACHINE WHEN YOU INTEND TO NAP AFTER TAKING PAIN MEDICATION. Ankle Pumps Ankle pumps increase the circulation of oxygenated blood to your lower extremities and decrease your risk for circulation problems such as blood clots. They also stretch the muscles, tendons and ligaments in your foot and ankle, and prevent joint contracture in the ankle and foot, especially after surgeries on the legs. It is important to do ankle pump exercises regularly after surgery because immobility increases your risk for developing a blood clot. Your doctor may also have you take an Aspirin for the next few days as well. If your doctor did not ask you to take an Aspirin, consult with him before starting Aspirin therapy on your own. Slowly point your foot forward, feeling the muscles on the top of your lower leg stretch, and hold this position for 5 seconds. Next, pull your foot back toward you as far as possible, stretching the calf muscles, and hold that position for 5 seconds. Repeat with the other foot. Perform 10 repetitions every hour while awake for both ankles if possible (down and then up with the foot once is one repetition). You should feel gentle stretching of the muscles in your lower leg when doing this exercise. If you feel pain, or your range of motion is limited, don't  Push too hard. Only go the limit your joint and muscles will let you go. If you have increasing pain, progressively worsening leg warmth or swelling, STOP the exercise and call your doctor. Below is information about the medications your doctor is prescribing after your visit: 
 
Other information in your discharge envelope: PRESCRIPTIONS PHYSICAL THERAPY PRESCRIPTION 
     APPOINTMENT CARDS [facility] Pamphlet for block or block with On-Q Catheter from Anesthesia Service Medical device information sheets/pamphlets from their  School/work excuse note. /parent work excuse note. These are general instructions for a healthy lifestyle: *  Please give a list of your current medications to your Primary Care Provider. *  Please update this list whenever your medications are discontinued, doses are 
    changed, or new medications (including over-the-counter products) are added. *  Please carry medication information at all times in case of emergency situations. About Smoking No smoking / No tobacco products / Avoid exposure to second hand smoke Surgeon General's Warning:  Quitting smoking now greatly reduces serious risk to your health. Obesity, smoking, and sedentary lifestyle greatly increases your risk for illness and disease. A healthy diet, regular physical exercise & weight monitoring are important for maintaining a healthy lifestyle. Congestive Heart Failure You may be retaining fluid if you have a history of heart failure or if you experience any of the following symptoms:  Weight gain of 3 pounds or more overnight or 5 pounds in a week, increased swelling in our hands or feet or shortness of breath while lying flat in bed. Please call your doctor as soon as you notice any of these symptoms; do not wait until your next office visit. Recognize signs and symptoms of STROKE: 
F - face looks uneven A - arms unable to move or move even S - speech slurred or non-existent T - time-call 911 as soon as signs and symptoms begin-DO NOT go Back to bed or wait to see if you get better-TIME IS BRAIN. Warning signs of HEART ATTACK Call 911 if you have these symptoms · Chest discomfort. Most heart attacks involve discomfort in the center of the chest that lasts more than a few minutes, or that goes away and comes back. It can feel like uncomfortable pressure, squeezing, fullness, or pain. · Discomfort in other areas of the upper body. Symptoms can include pain or discomfort in one or both Arms, the back, neck, jaw, or stomach. ·  Shortness of breath with or without chest discomfort. · Other signs may include breaking out in a cold sweat, nausea, or lightheadedness Don't wait more than five minutes to call 911 - MINUTES MATTER! Fast action can save your life. Calling 911 is almost always the fastest way to get lifesaving treatment. Emergency Medical Services staff can begin treatment when they arrive - up to an hour sooner than if someone gets to the hospital by car. [facility] MEDICATION AND SIDE EFFECT GUIDE The [facility] [place] EFFECT GUIDE was provided to the PATIENT AND CARE PROVIDER. Information provided includes instruction about drug purpose and common side effects for the following medications: Percocet ACO Transitions of Care Introducing [employer] offers a voluntary care coordination program to provide high quality service and care to [facility] fee-for-service beneficiariesJulissa Quintanilla was designed to help you enhance your health and well-being through the following services: ? Transitions of Care  support for individuals who are transitioning from one care setting to another (example: Hospital to home). ? Chronic and Complex Care Coordination  support for individuals and caregivers of those with serious or chronic illnesses or with more than one chronic (ongoing) condition and those who take a number of different medications. If you meet specific medical criteria, a [facility] [address] may call you directly to coordinate your care with your primary care physician and your other care providers. For questions about the [facility] programs, please, contact your physicians office. For general questions or additional information about Accountable Care Organizations: 
Please visit www.medicare.gov/acos. html or call 1-800-MEDICARE (6-817.163.6496) TTY users should call 4-216.590.7692. Introducing [facility]! Dear Joshua Beaver: 
Thank you for requesting a [employer] account. Our records indicate that you already have an active [employer] account. You can access your account anytime at [email or web address] Did you know that you can access your hospital and ER discharge instructions at any time in [employer]? You can also review all of your test results from your hospital stay or ER visit. Additional Information If you have questions, please visit the Frequently Asked Questions section of the [employer] website at [email or web address] Remember, [employer] is NOT to be used for urgent needs. For medical emergencies, dial 911. Now available from your iPhone and Android! Providers Seen During Your Hospitalization Provider Specialty Primary office phone Danielle George MD Surgery [phone] Your Primary Care Physician (PCP) Primary Care Physician Office Phone Office Fax [phone] [phone] You are allergic to the following Allergen Reactions Codeine Drowsiness Niacin Rash Recent Documentation Height Weight BMI Smoking Status 1.676 m 88.9 kg 31.64 kg/m2 Never Smoker Emergency Contacts Name Discharge Info Relation Home Work Mobile Hernando Lopez DISCHARGE CAREGIVER [3] Spouse [3] [phone] Patient Belongings The following personal items are in your possession at time of discharge: 
  Dental Appliances: None  Visual Aid: Glasses      Home Medications: None   Jewelry: None  Clothing: Footwear, Pants, Shirt, Jacket/Coat, Socks, Undergarments    Other Valuables: None Please provide this summary of care documentation to your next provider. Signatures-by signing, you are acknowledging that this After Visit Summary has been reviewed with you and you have received a copy. Patient Signature:  ____________________________________________________________ Date:  ____________________________________________________________  
  
[facility] Provider Signature:  ____________________________________________________________ Date:  ____________________________________________________________

## 2018-01-04 NOTE — ANESTHESIA POSTPROCEDURE EVALUATION
Post-Anesthesia Evaluation and Assessment    Patient: Yamileth Baez MRN: [id number]  SSN: [id number]    YOB: 1945  Age: 74 y.o. Sex: male       Cardiovascular Function/Vital Signs  Visit Vitals    /60    Pulse 93    Temp 36.8 °C (98.2 °F)    Resp 11    Ht 5' 6\" (1.676 m)    Wt 88.9 kg (196 lb)    SpO2 96%    BMI 31.64 kg/m2       Patient is status post general anesthesia for Procedure(s):  BILATERAL INGUINAL HERNIA REPAIR. Nausea/Vomiting: None    Postoperative hydration reviewed and adequate. Pain:  Pain Scale 1: Numeric (0 - 10) (01/04/18 1114)  Pain Intensity 1: 0 (01/04/18 1114)   Managed    Neurological Status:   Neuro (WDL): Within Defined Limits (01/04/18 1114)  Neuro  Neurologic State: Drowsy; Eyes open spontaneously (01/04/18 1043)   At baseline    Mental Status and Level of Consciousness: Arousable    Pulmonary Status:   O2 Device: Room air (01/04/18 1114)   Adequate oxygenation and airway patent    Complications related to anesthesia: None    Post-anesthesia assessment completed.  No concerns    Signed By: Alfred Strauss MD     January 4, 2018

## 2018-01-04 NOTE — ANESTHESIA PREPROCEDURE EVALUATION
Anesthetic History   No history of anesthetic complications            Review of Systems / Medical History  Patient summary reviewed and pertinent labs reviewed    Pulmonary  Within defined limits                 Neuro/Psych   Within defined limits           Cardiovascular    Hypertension              Exercise tolerance: >4 METS     GI/Hepatic/Renal  Within defined limits              Endo/Other            Comments: SIADH Other Findings   Comments: Pre DM           Physical Exam    Airway  Mallampati: II  TM Distance: 4 - 6 cm  Neck ROM: normal range of motion   Mouth opening: Normal     Cardiovascular    Rhythm: regular  Rate: normal         Dental    Dentition: Implants and Bridges     Pulmonary  Breath sounds clear to auscultation               Abdominal         Other Findings            Anesthetic Plan    ASA: 2  Anesthesia type: general            Anesthetic plan and risks discussed with: Patient

## 2018-01-04 NOTE — H&P (VIEW-ONLY)
Surgery History and Physical    Subjective:      Cassia Johnson is a 74 y. o. white male who presents for evaluation of a right inguinal hernia. Mr. Ovi Cutler developed a bulge in his right groin a few weeks ago after lifting. The bulge hangs down towards his scrotum and is causing discomfort. It always sticks out. He is eating fine and moving his bowels and urinating normally. He denies any previous hernia repair at this site. He denies any bulge on the left side, but was seen by Dr. Peri Bliss several years ago for a left inguinal hernia. He decided not to have a repair at that time. He has a h/o prostate cancer and has had an open prostatectomy and pelvic radiation. He has also had a primary umbilical herniorrhaphy. Past Medical History:   Diagnosis Date    [facility])     Prostate    Diabetes ([facility] [address]     NIDDM    Hypercholesterolemia     Hypertension     SIADH (syndrome of inappropriate ADH production) ([facility] [address] [facility]     Staphylococcus infection in conditions classified elsewhere and of unspecified site      Past Surgical History:   Procedure Laterality Date    COLONOSCOPY  5/2006    HX CHOLECYSTECTOMY      6/2017 Dr. Wilda Martinez      umbilical    HX PROSTATECTOMY      HX UROLOGICAL      Had scar Tissue remove from bladder      Family History   Problem Relation Age of Onset    Heart Disease Mother     Heart Disease Father     Heart Attack Father      Social History   Substance Use Topics    Smoking status: Never Smoker    Smokeless tobacco: Never Used    Alcohol use No      Prior to Admission medications    Medication Sig Start Date End Date Taking? Authorizing Provider   olmesartan (BENICAR) 40 mg tablet TAKE 1 TABLET BY MOUTH EVERY DAY  Indications: hypertension 7/7/17  Yes Marlo Perrin NP   dextromethorphan-guaiFENesin (ROBITUSSIN COUGH-CHEST WINTER DM)  mg/5 mL liqd Take  by mouth.    Yes Historical Provider   loratadine (CLARITIN) 10 mg tablet Take 1 Tab by mouth daily for 360 days. 6/12/17 6/7/18 Yes Gavino Rollins MD   simvastatin (ZOCOR) 20 mg tablet Take 1 Tab by mouth nightly. 4/11/17  Yes Gavino Rollins MD   pnv w/o calcium-iron fum-fa 27-1 mg tab Take  by mouth. Yes Historical Provider   multivitamin, stress formula (STRESS TAB) tablet Take 1 Tab by mouth daily. Yes Historical Provider   Lycopene 15 mg cap Take  by mouth. Yes Historical Provider   glucosamine 1,000 mg tab Take  by mouth. Yes Historical Provider   POMEGRANATE XT/POMEGRANAT SEED (POMEGRANATE PO) Take  by mouth. Yes Historical Provider   Omega-3-DHA-EPA-Fish Oil 1,000 (120-180) mg cap Take  by mouth. Yes Historical Provider   calcium citrate-vitamin d3 (CITRACAL+D) 315-200 mg-unit tab Take 1 Tab by mouth daily (with breakfast). Yes Historical Provider   BORON PO Take  by mouth. Yes Historical Provider   COQ10, UBIQUINOL, PO Take  by mouth. Yes Historical Provider   SOY ISOFLAVONE PO Take  by mouth. Yes Historical Provider   cholecalciferol, vitamin D3, (VITAMIN D3) 2,000 unit tab Take  by mouth. Yes Historical Provider   aspirin 81 mg chewable tablet Take 81 mg by mouth daily. Yes Historical Provider      Allergies   Allergen Reactions    Codeine Drowsiness    Niacin Rash       Review of Systems:  A comprehensive review of systems was negative except for that written in the History of Present Illness. Objective:     Physical Exam:  GENERAL: alert, cooperative, no distress, appears stated age, EYE: negative findings: anicteric sclera, LYMPHATIC: Cervical, supraclavicular nodes normal. , THROAT & NECK: neck supple and symmetrical.  The thyroid is grossly normal., LUNG: clear to auscultation bilaterally, HEART: regular rate and rhythm, ABDOMEN: Soft, NT, ND. There is a healed lower midline surgical scar., GROIN: On the right, there is a reducible inguinal hernia. On the left, there is a reducible inguinal hernia. Genitalia is grossly normal.  Testicles are descended bilaterally. , EXTREMITIES:  no edema, SKIN: Normal., NEUROLOGIC: negative, PSYCHIATRIC: non focal    Assessment:     Bilateral inguinal hernias without gangrene or obstruction. Plan:     Mr. Zbigniew Horvath and his wife have finally decided on repairing the hernias, but would like to wait until after the new year. I had a lengthy discussion with them and answered all questions and concerns which took at least 45 minutes. I discussed the risks of the procedure including bleeding, infection, wound healing problems, recurrence of the hernias, injury to the spermatic cords or cutaneous nerves, and reaction to the prep or local and general anesthetic. They understand the risks; any and all questions were answered to their satisfaction. Because of his history, he is not a candidate for a laparoscopic repair. Mr. Zbigniew Horvath will be scheduled for an elective outpatient bilateral inguinal herniorrhaphies with mesh under general with LMA.     Signed By: Yamilka Lyn MD     December 12, 2017

## 2018-01-05 ENCOUNTER — TELEPHONE (OUTPATIENT)
Dept: SURGERY | Age: 73
End: 2018-01-05

## 2018-01-05 LAB
ATRIAL RATE: 84 BPM
CALCULATED P AXIS, ECG09: 71 DEGREES
CALCULATED R AXIS, ECG10: 16 DEGREES
CALCULATED T AXIS, ECG11: 49 DEGREES
DIAGNOSIS, 93000: NORMAL
P-R INTERVAL, ECG05: 178 MS
Q-T INTERVAL, ECG07: 324 MS
QRS DURATION, ECG06: 86 MS
QTC CALCULATION (BEZET), ECG08: 382 MS
VENTRICULAR RATE, ECG03: 84 BPM

## 2018-01-05 NOTE — TELEPHONE ENCOUNTER
Called to follow up with patient after surgery. Was able to speak with patient and also his wife. They both agreed he is doing good. He is taking the pain medication and also miralax. Last bowel movement was yesterday before surgery. Urine flow is normal. He is eating and drinking as usual. He did receive discharge instructions before her left the hospital, has no questions regarding the instructions. Patient aware when to remove the bandages and the steri strips will fall off on its own. Follow up visit set for 1/17/18.  Patient will call office if he or she has any further questions or concerns

## 2018-01-19 ENCOUNTER — OFFICE VISIT (OUTPATIENT)
Dept: SURGERY | Age: 73
End: 2018-01-19

## 2018-01-19 VITALS
RESPIRATION RATE: 14 BRPM | HEART RATE: 75 BPM | HEIGHT: 66 IN | WEIGHT: 195 LBS | OXYGEN SATURATION: 97 % | DIASTOLIC BLOOD PRESSURE: 59 MMHG | TEMPERATURE: 97.8 F | BODY MASS INDEX: 31.34 KG/M2 | SYSTOLIC BLOOD PRESSURE: 112 MMHG

## 2018-01-19 DIAGNOSIS — Z98.890 S/P BILATERAL INGUINAL HERNIA REPAIR: ICD-10-CM

## 2018-01-19 DIAGNOSIS — Z87.19 S/P BILATERAL INGUINAL HERNIA REPAIR: ICD-10-CM

## 2018-01-19 PROBLEM — K40.20 BILATERAL INGUINAL HERNIA WITHOUT OBSTRUCTION OR GANGRENE: Status: RESOLVED | Noted: 2017-12-12 | Resolved: 2018-01-19

## 2018-01-19 NOTE — PROGRESS NOTES
Subjective:      Maria Guadalupe Leon is a 74 y. o. white male presents for postop care 2 weeks following hernia repairs. Mr. Brayan Pantoja is doing fine. His appetite is good, and he is eating a regular diet without difficulty. His bowel movements are regular. He has some swelling on each side and pain below the incisions. Objective:     Visit Vitals    /59 (BP 1 Location: Left arm, BP Patient Position: Sitting)    Pulse 75    Temp 97.8 °F (36.6 °C) (Oral)    Resp 14    Ht 5' 6\" (1.676 m)    Wt 195 lb (88.5 kg)    SpO2 97%    BMI 31.47 kg/m2       General:  alert, cooperative, no distress   Groin:  On the right, the incision is clean, dry, and intact with no erythema. There is moderate swelling, but no hernia. On the left, the incision is clean, dry, and intact with no erythema. There is moderate swelling, but no hernia. Assessment:     1st POV, s/p bilateral inguinal herniorrhaphies with mesh. Plan:     Mr. Brayan Pantoja appears to be doing fine so far. He will continue with light activity until he comes back again in 4 weeks for his final checkup.

## 2018-01-19 NOTE — PROGRESS NOTES
1. Have you been to the ER, urgent care clinic since your last visit? Hospitalized since your last visit?no    2. Have you seen or consulted any other health care providers outside of the [facility] since your last visit? Include any pap smears or colon screening.  no

## 2018-02-16 ENCOUNTER — OFFICE VISIT (OUTPATIENT)
Dept: SURGERY | Age: 73
End: 2018-02-16

## 2018-02-16 VITALS
HEART RATE: 79 BPM | TEMPERATURE: 97.9 F | BODY MASS INDEX: 30.94 KG/M2 | DIASTOLIC BLOOD PRESSURE: 56 MMHG | WEIGHT: 192.5 LBS | OXYGEN SATURATION: 98 % | SYSTOLIC BLOOD PRESSURE: 94 MMHG | RESPIRATION RATE: 20 BRPM | HEIGHT: 66 IN

## 2018-02-16 DIAGNOSIS — Z87.19 S/P BILATERAL INGUINAL HERNIA REPAIR: Primary | ICD-10-CM

## 2018-02-16 DIAGNOSIS — Z98.890 S/P BILATERAL INGUINAL HERNIA REPAIR: Primary | ICD-10-CM

## 2018-02-16 NOTE — PROGRESS NOTES
1. Have you been to the ER, urgent care clinic since your last visit? Hospitalized since your last visit? No    2. Have you seen or consulted any other health care providers outside of the [address] [place] [address] since your last visit? Include any pap smears or colon screening.  No

## 2018-02-16 NOTE — PROGRESS NOTES
Subjective:      Doraine Schilder is a 74 y. o. white male presents for postop care 6 weeks following b/l inguinal hernia repairs. Mr. Wilda Leong is doing fine. He denies any pain, and the swelling has resolved. Objective:     Visit Vitals    BP 94/56    Pulse 79    Temp 97.9 °F (36.6 °C)    Resp 20    Ht 5' 6\" (1.676 m)    Wt 192 lb 8 oz (87.3 kg)    SpO2 98%    BMI 31.07 kg/m2       General:  alert, cooperative, no distress   Groin:  The incisions are healed. On the right, there is no hernia. On the left, there is no hernia. Assessment:     2nd POV, s/p b/l inguinal herniorrhaphies with mesh. Plan:     Mr. Wilda Leong is doing fine and can gradually resume normal activity. He can f/u with me prn.

## 2018-02-19 DIAGNOSIS — N48.1 BALANITIS: ICD-10-CM

## 2018-02-19 RX ORDER — CLOTRIMAZOLE AND BETAMETHASONE DIPROPIONATE 10; .64 MG/G; MG/G
CREAM TOPICAL 2 TIMES DAILY
Qty: 15 G | Refills: 0 | Status: SHIPPED | OUTPATIENT
Start: 2018-02-19 | End: 2019-11-13 | Stop reason: SDUPTHER

## 2018-04-25 ENCOUNTER — OFFICE VISIT (OUTPATIENT)
Dept: FAMILY MEDICINE CLINIC | Age: 73
End: 2018-04-25

## 2018-04-25 ENCOUNTER — HOSPITAL ENCOUNTER (OUTPATIENT)
Dept: LAB | Age: 73
Discharge: HOME | End: 2018-04-25
Payer: MEDICARE

## 2018-04-25 VITALS
BODY MASS INDEX: 30.86 KG/M2 | TEMPERATURE: 98 F | RESPIRATION RATE: 16 BRPM | HEART RATE: 74 BPM | WEIGHT: 192 LBS | SYSTOLIC BLOOD PRESSURE: 96 MMHG | HEIGHT: 66 IN | OXYGEN SATURATION: 94 % | DIASTOLIC BLOOD PRESSURE: 55 MMHG

## 2018-04-25 DIAGNOSIS — E66.01 MORBID OBESITY (HCC): ICD-10-CM

## 2018-04-25 DIAGNOSIS — C61 PROSTATE CA (HCC): ICD-10-CM

## 2018-04-25 DIAGNOSIS — I10 ESSENTIAL HYPERTENSION, BENIGN: Primary | ICD-10-CM

## 2018-04-25 DIAGNOSIS — E78.2 MIXED HYPERLIPIDEMIA: ICD-10-CM

## 2018-04-25 DIAGNOSIS — E55.9 VITAMIN D DEFICIENCY: ICD-10-CM

## 2018-04-25 DIAGNOSIS — E87.1 HYPONATREMIA: ICD-10-CM

## 2018-04-25 DIAGNOSIS — R73.03 PRE-DIABETES: ICD-10-CM

## 2018-04-25 DIAGNOSIS — R73.9 ELEVATED BLOOD SUGAR: ICD-10-CM

## 2018-04-25 PROCEDURE — 83036 HEMOGLOBIN GLYCOSYLATED A1C: CPT

## 2018-04-25 PROCEDURE — 80053 COMPREHEN METABOLIC PANEL: CPT

## 2018-04-25 PROCEDURE — 86141 C-REACTIVE PROTEIN HS: CPT

## 2018-04-25 PROCEDURE — 85025 COMPLETE CBC W/AUTO DIFF WBC: CPT

## 2018-04-25 PROCEDURE — 82306 VITAMIN D 25 HYDROXY: CPT

## 2018-04-25 PROCEDURE — 80061 LIPID PANEL: CPT

## 2018-04-25 PROCEDURE — 83090 ASSAY OF HOMOCYSTEINE: CPT

## 2018-04-25 PROCEDURE — 84443 ASSAY THYROID STIM HORMONE: CPT

## 2018-04-25 NOTE — PROGRESS NOTES
Chief Complaint   Patient presents with    Hypertension     80/46    Labs     fasting today     1. Have you been to the ER, urgent care clinic since your last visit? Hospitalized since your last visit? No    2. Have you seen or consulted any other health care providers outside of the [facility] since your last visit? Include any pap smears or colon screening. No        Chief Complaint   Patient presents with    Hypertension     80/46    Labs     fasting today     He is a 74 y.o. male who presents for evalution. Reviewed PmHx, RxHx, FmHx, SocHx, AllgHx and updated and dated in the chart. Patient Active Problem List    Diagnosis    S/P bilateral inguinal hernia repair     With mesh.  Obesity (BMI 30.0-34. 9)    Advanced care planning/counseling discussion     Has LW      Advance care planning     Pt has a LW      Osteoporosis    Pre-diabetes    Hypogonadism male    Vitamin D deficiency    Prostate CA ([facility] [address]    Hyponatremia    OCD (obsessive compulsive disorder)    Anxiety state, unspecified    Mixed hyperlipidemia    Essential hypertension, benign       Review of Systems - negative except as listed above in the HPI    Objective:     Vitals:    04/25/18 0728   BP: 96/55   Pulse: 74   Resp: 16   Temp: 98 °F (36.7 °C)   TempSrc: Oral   SpO2: 94%   Weight: 192 lb (87.1 kg)   Height: 5' 6\" (1.676 m)     Physical Examination: General appearance - alert, well appearing, and in no distress  Chest - clear to auscultation, no wheezes, rales or rhonchi, symmetric air entry  Heart - normal rate, regular rhythm, normal S1, S2, no murmurs, rubs, clicks or gallops  Abdomen - soft, nontender, nondistended, no masses or organomegaly  Extremities - peripheral pulses normal, no pedal edema, no clubbing or cyanosis    Assessment/ Plan:   Diagnoses and all orders for this visit:    1.  Essential hypertension, benign  -     LIPID PANEL  -     METABOLIC PANEL, COMPREHENSIVE  -     CBC WITH AUTOMATED DIFF  -     CRP, HIGH SENSITIVITY  -     HOMOCYSTEINE, PLASMA  -at goal    2. Pre-diabetes  -     METABOLIC PANEL, COMPREHENSIVE  -     HEMOGLOBIN A1C WITH EAG    3. Elevated blood sugar  -     METABOLIC PANEL, COMPREHENSIVE  -     HEMOGLOBIN A1C WITH EAG    4. Hyponatremia  -     METABOLIC PANEL, COMPREHENSIVE    5. Vitamin D deficiency  -     VITAMIN D, 25 HYDROXY    6. Mixed hyperlipidemia  -     LIPID PANEL  -     METABOLIC PANEL, COMPREHENSIVE  -     TSH 3RD GENERATION    7. Prostate CA [facility])  -seeing urology       Follow-up Disposition:  Return if symptoms worsen or fail to improve. I have discussed the diagnosis with the patient and the intended plan as seen in the above orders. The patient understands and agrees with the plan. The patient has received an after-visit summary and questions were answered concerning future plans. Medication Side Effects and Warnings were discussed with patient  Patient Labs were reviewed and or requested:  Patient Past Records were reviewed and or requested    Kati Acuna M.D. There are no Patient Instructions on file for this visit.

## 2018-04-25 NOTE — MR AVS SNAPSHOT
[address] [address] 
[phone] Patient: Silvio Beard MRN:  [id number] Visit Information Date & Time Provider Department Dept. Phone Encounter #  
 4/25/2018  7:15 AM Luis Alberto Alanis MD [address] [facility] [phone] [id number] Follow-up Instructions Return if symptoms worsen or fail to improve. Upcoming Health Maintenance Date Due  
 MEDICARE YEARLY EXAM 6/13/2018 GLAUCOMA SCREENING Q2Y 4/11/2019 DTaP/Tdap/Td series (2 - Td) 8/28/2023 COLONOSCOPY 7/24/2027 Allergies as of 4/25/2018  Review Complete On: 4/25/2018 By: Luis Alberto Alanis MD  
  
 Severity Noted Reaction Type Reactions Codeine  06/12/2017    Drowsiness Niacin  07/18/2016    Rash Current Immunizations  Reviewed on 10/24/2017 Name Date Influenza High Dose Vaccine PF 10/24/2017, 10/3/2016, 9/28/2016 Influenza Vaccine 4/13/2016, 12/4/2014, 10/3/2013 Influenza Vaccine Split 10/18/2012, 11/2/2011 Pneumococcal Conjugate (PCV-13) 4/13/2016 Pneumococcal Polysaccharide (PPSV-23) 10/24/2009 Tdap 8/28/2013 ZZZ-RETIRED (DO NOT USE) Pneumococcal Vaccine (Unspecified Type) 11/2/2009 Zoster Vaccine, Live 8/19/2011 Not reviewed this visit You Were Diagnosed With   
  
 Codes Comments Essential hypertension, benign    -  Primary ICD-10-CM: I10 
ICD-9-CM: 401.1 Pre-diabetes     ICD-10-CM: R73.03 
ICD-9-CM: 790.29 Elevated blood sugar     ICD-10-CM: R73.9 ICD-9-CM: 790.29 Hyponatremia     ICD-10-CM: E87.1 ICD-9-CM: 276.1 Vitamin D deficiency     ICD-10-CM: E55.9 ICD-9-CM: 268.9 Mixed hyperlipidemia     ICD-10-CM: E78.2 ICD-9-CM: 272.2 Prostate CA [facility])     ICD-10-CM: R72 ICD-9-CM: 736 Vitals BP Pulse Temp Resp Height(growth percentile) Weight(growth percentile) 96/55 74 98 °F (36.7 °C) (Oral) 16 5' 6\" (1.676 m) 192 lb (87.1 kg) SpO2 BMI Smoking Status 94% 30.99 kg/m2 Never Smoker Vitals History BMI and BSA Data Body Mass Index Body Surface Area 30.99 kg/m 2 2.01 m 2 Preferred Pharmacy Pharmacy Name Phone [facility] [phone] Your Updated Medication List  
  
   
This list is accurate as of 4/25/18  8:04 AM.  Always use your most recent med list.  
  
  
  
  
 aspirin 81 mg chewable tablet Take 81 mg by mouth daily. BORON PO Take  by mouth.  
  
 calcium citrate-vitamin d3 315-200 mg-unit Tab Commonly known as:  CITRACAL+D Take 1 Tab by mouth daily (with breakfast). clotrimazole-betamethasone topical cream  
Commonly known as:  Vinicio Woodstock Apply  to affected area two (2) times a day. COQ10 (UBIQUINOL) PO Take  by mouth. glucosamine 1,000 mg Tab Take 1,000 mg by mouth Before breakfast, lunch, and dinner. Lycopene 15 mg Cap Take  by mouth.  
  
 multivitamin, stress formula tablet Commonly known as:  STRESS TAB Take 1 Tab by mouth daily. olmesartan 40 mg tablet Commonly known as:  BENICAR  
TAKE 1 TABLET BY MOUTH EVERY DAY  Indications: hypertension  
  
 omega 3-DHA-EPA-fish oil 1,000 mg (120 mg-180 mg) capsule Take  by mouth. POMEGRANATE PO Take  by mouth. simvastatin 20 mg tablet Commonly known as:  ZOCOR Take 1 Tab by mouth nightly. SOY ISOFLAVONE PO Take  by mouth. VITAMIN D3 2,000 unit Tab Generic drug:  cholecalciferol (vitamin D3) Take  by mouth. VITAMIN K2 PO Take  by mouth. We Performed the Following CBC WITH AUTOMATED DIFF [28419 CPT(R)] CRP, HIGH SENSITIVITY [95658 CPT(R)] HEMOGLOBIN A1C WITH EAG [52639 CPT(R)] HOMOCYSTEINE, PLASMA [id number] CPT(R)] LIPID PANEL [16410 CPT(R)] METABOLIC PANEL, COMPREHENSIVE [94181 CPT(R)] TSH 3RD GENERATION [88459 CPT(R)] VITAMIN D, 25 HYDROXY [id number] CPT(R)] Follow-up Instructions Return if symptoms worsen or fail to improve. Introducing [facility]! Dear Kassy Galloway: 
Thank you for requesting a [facility] account. Our records indicate that you already have an active URX account. You can access your account anytime at [email or web address] Did you know that you can access your hospital and ER discharge instructions at any time in URX? You can also review all of your test results from your hospital stay or ER visit. Additional Information If you have questions, please visit the Frequently Asked Questions section of the URX website at [email or web address] Remember, URX is NOT to be used for urgent needs. For medical emergencies, dial 911. Now available from your iPhone and Android! Please provide this summary of care documentation to your next provider. Your primary care clinician is listed as MARE GOMEZ. If you have any questions after today's visit, please call [phone].

## 2018-04-27 LAB
25(OH)D3+25(OH)D2 SERPL-MCNC: 87.6 NG/ML (ref 30–100)
ALBUMIN SERPL-MCNC: 4.3 G/DL (ref 3.5–4.8)
ALBUMIN/GLOB SERPL: 2.2 {RATIO} (ref 1.2–2.2)
ALP SERPL-CCNC: 38 IU/L (ref 39–117)
ALT SERPL-CCNC: 21 IU/L (ref 0–44)
AST SERPL-CCNC: 33 IU/L (ref 0–40)
BASOPHILS # BLD AUTO: 0.1 X10E3/UL (ref 0–0.2)
BASOPHILS NFR BLD AUTO: 1 %
BILIRUB SERPL-MCNC: 0.4 MG/DL (ref 0–1.2)
BUN SERPL-MCNC: 20 MG/DL (ref 8–27)
BUN/CREAT SERPL: 20 (ref 10–24)
CALCIUM SERPL-MCNC: 9.1 MG/DL (ref 8.6–10.2)
CHLORIDE SERPL-SCNC: 96 MMOL/L (ref 96–106)
CHOLEST SERPL-MCNC: 155 MG/DL (ref 100–199)
CO2 SERPL-SCNC: 24 MMOL/L (ref 18–29)
CREAT SERPL-MCNC: 1 MG/DL (ref 0.76–1.27)
CRP SERPL HS-MCNC: 0.76 MG/L (ref 0–3)
EOSINOPHIL # BLD AUTO: 0.2 X10E3/UL (ref 0–0.4)
EOSINOPHIL NFR BLD AUTO: 3 %
ERYTHROCYTE [DISTWIDTH] IN BLOOD BY AUTOMATED COUNT: 14.1 % (ref 12.3–15.4)
EST. AVERAGE GLUCOSE BLD GHB EST-MCNC: 117 MG/DL
GFR SERPLBLD CREATININE-BSD FMLA CKD-EPI: 73 ML/MIN/1.73
GFR SERPLBLD CREATININE-BSD FMLA CKD-EPI: 85 ML/MIN/1.73
GLOBULIN SER CALC-MCNC: 2 G/DL (ref 1.5–4.5)
GLUCOSE SERPL-MCNC: 94 MG/DL (ref 65–99)
HBA1C MFR BLD: 5.7 % (ref 4.8–5.6)
HCT VFR BLD AUTO: 41.7 % (ref 37.5–51)
HCYS SERPL-SCNC: 13.1 UMOL/L (ref 0–15)
HDLC SERPL-MCNC: 46 MG/DL
HGB BLD-MCNC: 14.2 G/DL (ref 13–17.7)
IMM GRANULOCYTES # BLD: 0 X10E3/UL (ref 0–0.1)
IMM GRANULOCYTES NFR BLD: 0 %
INTERPRETATION, 910389: NORMAL
LDLC SERPL CALC-MCNC: 91 MG/DL (ref 0–99)
LYMPHOCYTES # BLD AUTO: 1.7 X10E3/UL (ref 0.7–3.1)
LYMPHOCYTES NFR BLD AUTO: 21 %
MCH RBC QN AUTO: 30.6 PG (ref 26.6–33)
MCHC RBC AUTO-ENTMCNC: 34.1 G/DL (ref 31.5–35.7)
MCV RBC AUTO: 90 FL (ref 79–97)
MONOCYTES # BLD AUTO: 0.9 X10E3/UL (ref 0.1–0.9)
MONOCYTES NFR BLD AUTO: 12 %
NEUTROPHILS # BLD AUTO: 5.1 X10E3/UL (ref 1.4–7)
NEUTROPHILS NFR BLD AUTO: 63 %
PLATELET # BLD AUTO: 243 X10E3/UL (ref 150–379)
POTASSIUM SERPL-SCNC: 4.9 MMOL/L (ref 3.5–5.2)
PROT SERPL-MCNC: 6.3 G/DL (ref 6–8.5)
RBC # BLD AUTO: 4.64 X10E6/UL (ref 4.14–5.8)
SODIUM SERPL-SCNC: 135 MMOL/L (ref 134–144)
TRIGL SERPL-MCNC: 92 MG/DL (ref 0–149)
TSH SERPL DL<=0.005 MIU/L-ACNC: 2.79 UIU/ML (ref 0.45–4.5)
VLDLC SERPL CALC-MCNC: 18 MG/DL (ref 5–40)
WBC # BLD AUTO: 8 X10E3/UL (ref 3.4–10.8)

## 2018-07-09 DIAGNOSIS — E78.2 MIXED HYPERLIPIDEMIA: ICD-10-CM

## 2018-07-09 RX ORDER — SIMVASTATIN 20 MG/1
20 TABLET, FILM COATED ORAL
Qty: 90 TAB | Refills: 3 | Status: SHIPPED | OUTPATIENT
Start: 2018-07-09 | End: 2018-10-30 | Stop reason: SDUPTHER

## 2018-07-09 RX ORDER — OLMESARTAN MEDOXOMIL 40 MG/1
TABLET ORAL
Qty: 90 TAB | Refills: 0 | Status: SHIPPED | OUTPATIENT
Start: 2018-07-09 | End: 2018-10-11 | Stop reason: SDUPTHER

## 2018-10-11 RX ORDER — OLMESARTAN MEDOXOMIL 40 MG/1
TABLET ORAL
Qty: 90 TAB | Refills: 0 | Status: SHIPPED | OUTPATIENT
Start: 2018-10-11 | End: 2018-10-12 | Stop reason: SDUPTHER

## 2018-10-30 ENCOUNTER — OFFICE VISIT (OUTPATIENT)
Dept: FAMILY MEDICINE CLINIC | Age: 73
End: 2018-10-30

## 2018-10-30 VITALS
HEART RATE: 72 BPM | DIASTOLIC BLOOD PRESSURE: 65 MMHG | TEMPERATURE: 97.2 F | WEIGHT: 189 LBS | SYSTOLIC BLOOD PRESSURE: 127 MMHG | RESPIRATION RATE: 15 BRPM | BODY MASS INDEX: 30.37 KG/M2 | HEIGHT: 66 IN | OXYGEN SATURATION: 97 %

## 2018-10-30 DIAGNOSIS — Z00.00 ROUTINE GENERAL MEDICAL EXAMINATION AT A HEALTH CARE FACILITY: Primary | ICD-10-CM

## 2018-10-30 DIAGNOSIS — E78.2 MIXED HYPERLIPIDEMIA: ICD-10-CM

## 2018-10-30 DIAGNOSIS — I10 ESSENTIAL HYPERTENSION, BENIGN: ICD-10-CM

## 2018-10-30 DIAGNOSIS — Z71.89 ADVANCE CARE PLANNING: ICD-10-CM

## 2018-10-30 DIAGNOSIS — R73.9 ELEVATED BLOOD SUGAR: ICD-10-CM

## 2018-10-30 DIAGNOSIS — E87.1 HYPONATREMIA: ICD-10-CM

## 2018-10-30 RX ORDER — SIMVASTATIN 20 MG/1
20 TABLET, FILM COATED ORAL
Qty: 90 TAB | Refills: 3 | Status: SHIPPED | OUTPATIENT
Start: 2018-10-30 | End: 2019-11-13 | Stop reason: SDUPTHER

## 2018-10-30 RX ORDER — OLMESARTAN MEDOXOMIL 40 MG/1
TABLET ORAL
Qty: 90 TAB | Refills: 3 | Status: SHIPPED | OUTPATIENT
Start: 2018-10-30 | End: 2019-11-13 | Stop reason: SDUPTHER

## 2018-10-30 NOTE — PROGRESS NOTES
Chief Complaint   Patient presents with    Hypertension    Osteoporosis    Medication Refill    Labs     1. Have you been to the ER, urgent care clinic since your last visit? Hospitalized since your last visit? Yes [facility] 10[facility]4/18: sprained Left foot XR done    2. Have you seen or consulted any other health care providers outside of the [address] [place] [address] since your last visit? Include any pap smears or colon screening. Yes Where: Urology : [facility]      Medicare Wellness Exam:    Chief Complaint   Patient presents with    Hypertension    Osteoporosis    Medication Refill    Labs     he is a 74y.o. year old male who presents for evaluation for their Medicare Wellness Visit. Archibald Yosvany is completed and assessed=yes  Depression Screen is completed and assessed=yes  Medication list reviewed and adjusted for accuracy=yes  Immunizations reviewed and updated=yes  Health/Preventative Screenings reviewed and updated=yes  ADL Functions reviewed=yes    Patient Active Problem List    Diagnosis    Elevated blood sugar    [facility]bid [facility] ([facility] [address]    S/P bilateral inguinal hernia repair     With mesh.  Obesity (BMI 30.0-34. 9)    Advanced care planning/counseling discussion     Has LW      Advance care planning     Pt has a LW      Osteoporosis    Pre-diabetes    Hypogonadism male    Vitamin D deficiency    Prostate CA ([facility] [address]    Hyponatremia    OCD (obsessive compulsive disorder)    Anxiety state, unspecified    Mixed hyperlipidemia    Essential hypertension, benign       Reviewed PmHx, RxHx, FmHx, SocHx, AllgHx and updated and dated in the chart.     Review of Systems - negative except as listed above in the HPI    Objective:     Vitals:    10/30/18 0746   BP: 127/65   Pulse: 72   Resp: 15   Temp: 97.2 °F (36.2 °C)   TempSrc: Oral   SpO2: 97%   Weight: 189 lb (85.7 kg)   Height: 5' 6\" (1.676 m)     Physical Examination: General appearance - alert, well appearing, and in no distress  Mouth - mucous membranes moist, pharynx normal without lesions  Neck - supple, no significant adenopathy  Chest - clear to auscultation, no wheezes, rales or rhonchi, symmetric air entry  Heart - normal rate, regular rhythm, normal S1, S2, no murmurs, rubs, clicks or gallops  Abdomen - soft, nontender, nondistended, no masses or organomegaly  Extremities - peripheral pulses normal, no pedal edema, no clubbing or cyanosis    Assessment/ Plan:   Diagnoses and all orders for this visit:    1. Routine general medical examination at a health care facility  -see below    2. Mixed hyperlipidemia  -     simvastatin (ZOCOR) 20 mg tablet; Take 1 Tab by mouth nightly. -     LIPID PANEL  -     METABOLIC PANEL, COMPREHENSIVE  -at goal    3. Advance care planning  -has LW    4. Essential hypertension, benign  -     olmesartan (BENICAR) 40 mg tablet; TAKE 1 TABLET BY MOUTH EVERY DAY FOR HIGH BLOOD PRESSURE  -     LIPID PANEL  -     METABOLIC PANEL, COMPREHENSIVE  -at goal    5. Hyponatremia  -     METABOLIC PANEL, COMPREHENSIVE  -stable  -SIADH    6.  Elevated blood sugar  -     METABOLIC PANEL, COMPREHENSIVE  -     HEMOGLOBIN A1C WITH EAG  Lab Results   Component Value Date/Time    Hemoglobin A1c 5.7 (H) 04/25/2018 08:04 AM            -Pain evaluation performed in office-mild ankle sprain recently  -Cognitive Screen performed in office-nl  -Depression Screen, Fall risks (by up and go test)  and ADL functionality were addressed  -Medication list updated and reviewed for any changes   -A comprehensive review of medical issues and a plan was formulated  -End of life planning was addressed with pt   -Health Screenings for preventions were addressed and a plan was formulated  -Shingles Vaccine was recommended  -Discussed with patient cancer risk factors and appropriate screenings for age  -Patient evaluated for colonoscopy and referred if needed per screeing criteria  -Labs from previous visits were discussed with patient -Discussed with patient diet and exercise and formulated a plan as needed  -An Advanced care plan was developed with the patient.  -Alcohol screening performed and was negative    -Follow-up Disposition:  Return in about 6 months (around 4/30/2019). I have discussed the diagnosis with the patient and the intended plan as seen in the above orders. The patient understands and agrees with the plan. The patient has received an after-visit summary and questions were answered concerning future plans. Medication Side Effects and Warnings were discussed with patien  Patient Labs were reviewed and or requested  Patient Past Records were reviewed and or requested    There are no Patient Instructions on file for this visit.       Adi Bianchi M.D.

## 2018-10-31 LAB
ALBUMIN SERPL-MCNC: 4.5 G/DL (ref 3.5–4.8)
ALBUMIN/GLOB SERPL: 2.3 {RATIO} (ref 1.2–2.2)
ALP SERPL-CCNC: 37 IU/L (ref 39–117)
ALT SERPL-CCNC: 20 IU/L (ref 0–44)
AST SERPL-CCNC: 28 IU/L (ref 0–40)
BILIRUB SERPL-MCNC: 0.7 MG/DL (ref 0–1.2)
BUN SERPL-MCNC: 18 MG/DL (ref 8–27)
BUN/CREAT SERPL: 17 (ref 10–24)
CALCIUM SERPL-MCNC: 9.1 MG/DL (ref 8.6–10.2)
CHLORIDE SERPL-SCNC: 95 MMOL/L (ref 96–106)
CHOLEST SERPL-MCNC: 151 MG/DL (ref 100–199)
CO2 SERPL-SCNC: 22 MMOL/L (ref 20–29)
CREAT SERPL-MCNC: 1.05 MG/DL (ref 0.76–1.27)
EST. AVERAGE GLUCOSE BLD GHB EST-MCNC: 117 MG/DL
GLOBULIN SER CALC-MCNC: 2 G/DL (ref 1.5–4.5)
GLUCOSE SERPL-MCNC: 88 MG/DL (ref 65–99)
HBA1C MFR BLD: 5.7 % (ref 4.8–5.6)
HDLC SERPL-MCNC: 57 MG/DL
INTERPRETATION, 910389: NORMAL
LDLC SERPL CALC-MCNC: 79 MG/DL (ref 0–99)
POTASSIUM SERPL-SCNC: 4.8 MMOL/L (ref 3.5–5.2)
PROT SERPL-MCNC: 6.5 G/DL (ref 6–8.5)
SODIUM SERPL-SCNC: 132 MMOL/L (ref 134–144)
TRIGL SERPL-MCNC: 77 MG/DL (ref 0–149)
VLDLC SERPL CALC-MCNC: 15 MG/DL (ref 5–40)

## 2018-12-05 ENCOUNTER — OFFICE VISIT (OUTPATIENT)
Dept: FAMILY MEDICINE CLINIC | Age: 73
End: 2018-12-05

## 2018-12-05 VITALS
SYSTOLIC BLOOD PRESSURE: 110 MMHG | TEMPERATURE: 97.7 F | HEART RATE: 78 BPM | RESPIRATION RATE: 20 BRPM | WEIGHT: 189 LBS | DIASTOLIC BLOOD PRESSURE: 67 MMHG | BODY MASS INDEX: 30.37 KG/M2 | OXYGEN SATURATION: 96 % | HEIGHT: 66 IN

## 2018-12-05 DIAGNOSIS — M79.601 RIGHT ARM PAIN: Primary | ICD-10-CM

## 2018-12-05 DIAGNOSIS — I10 ESSENTIAL HYPERTENSION, BENIGN: ICD-10-CM

## 2018-12-05 NOTE — PROGRESS NOTES
Patient here for right upper arm/ shoulder pain x 2-3 weeks. Patient states it does feel better today. Pain 2/10. Patient states he is not taking otc meds. 1. Have you been to the ER, urgent care clinic since your last visit? Hospitalized since your last visit? No 
 
2. Have you seen or consulted any other health care providers outside of the [address] [place] [address] since your last visit? Include any pap smears or colon screening. No  
 
 
Better today Chief Complaint Patient presents with  Shoulder Pain  
  right upper arm, shoulder pain x 2-3 weeks. today better He is a 74 y.o. male who presents for evalution. Reviewed PmHx, RxHx, FmHx, SocHx, AllgHx and updated and dated in the chart. Patient Active Problem List  
 Diagnosis  Elevated blood sugar  Morbid obesity ([facility] [address]  S/P bilateral inguinal hernia repair With mesh.  Obesity (BMI 30.0-34. 9)  Advanced care planning/counseling discussion Has LW 
  
 Advance care planning Pt has a LW 
  
 Osteoporosis  Pre-diabetes  Hypogonadism male  Vitamin D deficiency  Prostate CA ([facility] [address]  Hyponatremia  OCD (obsessive compulsive disorder)  Anxiety state, unspecified  Mixed hyperlipidemia  Essential hypertension, benign Review of Systems - negative except as listed above in the HPI Objective:  
 
Vitals:  
 12/05/18 1016 BP: 110/67 Pulse: 78 Resp: 20 Temp: 97.7 °F (36.5 °C) SpO2: 96% Weight: 189 lb (85.7 kg) Height: 5' 6\" (1.676 m) Physical Examination: General appearance - alert, well appearing, and in no distress Chest - clear to auscultation, no wheezes, rales or rhonchi, symmetric air entry Heart - normal rate, regular rhythm, normal S1, S2, no murmurs, rubs, clicks or gallops R arm with neg exam 
 
Assessment/ Plan:  
Diagnoses and all orders for this visit: 1. Right arm pain 
-better -observe 2. Essential hypertension, benign -at goal 
 
  
 Follow-up Disposition: 
Return if symptoms worsen or fail to improve. I have discussed the diagnosis with the patient and the intended plan as seen in the above orders. The patient understands and agrees with the plan. The patient has received an after-visit summary and questions were answered concerning future plans. Medication Side Effects and Warnings were discussed with patient Patient Labs were reviewed and or requested: 
Patient Past Records were reviewed and or requested Douglas Carey M.D. There are no Patient Instructions on file for this visit.

## 2019-01-07 ENCOUNTER — OFFICE VISIT (OUTPATIENT)
Dept: FAMILY MEDICINE CLINIC | Age: 74
End: 2019-01-07

## 2019-01-07 VITALS
DIASTOLIC BLOOD PRESSURE: 73 MMHG | RESPIRATION RATE: 16 BRPM | TEMPERATURE: 98.1 F | BODY MASS INDEX: 30.86 KG/M2 | HEIGHT: 66 IN | SYSTOLIC BLOOD PRESSURE: 119 MMHG | HEART RATE: 99 BPM | WEIGHT: 192 LBS | OXYGEN SATURATION: 97 %

## 2019-01-07 DIAGNOSIS — N50.819 TESTES PAIN: Primary | ICD-10-CM

## 2019-01-07 DIAGNOSIS — H69.81 DYSFUNCTION OF RIGHT EUSTACHIAN TUBE: ICD-10-CM

## 2019-01-07 NOTE — PROGRESS NOTES
Chief Complaint   Patient presents with    Wax in Ear    Hearing Problem    Testicle Swelling     1. Have you been to the ER, urgent care clinic since your last visit? Hospitalized since your last visit? No    2. Have you seen or consulted any other health care providers outside of the [address] [place] [address] since your last visit? Include any pap smears or colon screening. No    Chief Complaint   Patient presents with    Wax in Ear    Hearing Problem    Testicle Swelling     He is a 74 y.o. male who presents for evalution. Reviewed PmHx, RxHx, FmHx, SocHx, AllgHx and updated and dated in the chart. Patient Active Problem List    Diagnosis    Elevated blood sugar    Morbid obesity ([facility] [address]    S/P bilateral inguinal hernia repair     With mesh.  Obesity (BMI 30.0-34. 9)    Advanced care planning/counseling discussion     Has LW      Advance care planning     Pt has a LW      Osteoporosis    Pre-diabetes    Hypogonadism male    Vitamin D deficiency    Prostate CA ([facility] [address]    Hyponatremia    OCD (obsessive compulsive disorder)    Anxiety state, unspecified    Mixed hyperlipidemia    Essential hypertension, benign       Review of Systems - negative except as listed above in the HPI    Objective:     Vitals:    01/07/19 1533   BP: 119/73   Pulse: 99   Resp: 16   Temp: 98.1 °F (36.7 °C)   TempSrc: Oral   SpO2: 97%   Weight: 192 lb (87.1 kg)   Height: 5' 6\" (1.676 m)     Physical Examination: General appearance - alert, well appearing, and in no distress  Ears - bilateral TM's and external ear canals normal  R test enlarged    Assessment/ Plan:   Diagnoses and all orders for this visit:    1. Testes pain  -refer back to urology    2. Dysfunction of right eustachian tube  -rec antihist     Follow-up Disposition:  Return if symptoms worsen or fail to improve. I have discussed the diagnosis with the patient and the intended plan as seen in the above orders.   The patient understands and agrees with the plan. The patient has received an after-visit summary and questions were answered concerning future plans. Medication Side Effects and Warnings were discussed with patient  Patient Labs were reviewed and or requested:  Patient Past Records were reviewed and or requested    Alice Vizcarra M.D. There are no Patient Instructions on file for this visit.

## 2019-03-13 ENCOUNTER — OFFICE VISIT (OUTPATIENT)
Dept: FAMILY MEDICINE CLINIC | Age: 74
End: 2019-03-13

## 2019-03-13 VITALS
OXYGEN SATURATION: 95 % | RESPIRATION RATE: 14 BRPM | HEART RATE: 86 BPM | WEIGHT: 188.8 LBS | TEMPERATURE: 98.1 F | BODY MASS INDEX: 30.34 KG/M2 | DIASTOLIC BLOOD PRESSURE: 65 MMHG | SYSTOLIC BLOOD PRESSURE: 139 MMHG | HEIGHT: 66 IN

## 2019-03-13 DIAGNOSIS — I10 ESSENTIAL HYPERTENSION, BENIGN: ICD-10-CM

## 2019-03-13 DIAGNOSIS — R10.31 RLQ ABDOMINAL PAIN: Primary | ICD-10-CM

## 2019-03-13 RX ORDER — BISMUTH SUBSALICYLATE 262 MG
1 TABLET,CHEWABLE ORAL DAILY
COMMUNITY

## 2019-03-13 NOTE — PROGRESS NOTES
Chief Complaint   Patient presents with    Possible Hernia     right lower abd area     1. Have you been to the ER, urgent care clinic since your last visit? Hospitalized since your last visit? No    2. Have you seen or consulted any other health care providers outside of the [address] [place] [address] since your last visit? Include any pap smears or colon screening. No      Chief Complaint   Patient presents with    Possible Hernia     right lower abd area     He is a 66 y.o. male who presents for evalution. Reviewed PmHx, RxHx, FmHx, SocHx, AllgHx and updated and dated in the chart. Patient Active Problem List    Diagnosis    Elevated blood sugar    Morbid obesity ([facility] [address]    S/P bilateral inguinal hernia repair     With mesh.  Obesity (BMI 30.0-34. 9)    Advanced care planning/counseling discussion     Has LW      Advance care planning     Pt has a LW      Osteoporosis    Pre-diabetes    Hypogonadism male    Vitamin D deficiency    Prostate CA ([facility] [address]    Hyponatremia    OCD (obsessive compulsive disorder)    Anxiety state, unspecified    Mixed hyperlipidemia    Essential hypertension, benign       Review of Systems - negative except as listed above in the HPI    Objective:     Vitals:    03/13/19 1346   BP: 139/65   Pulse: 86   Resp: 14   Temp: 98.1 °F (36.7 °C)   TempSrc: Oral   SpO2: 95%   Weight: 188 lb 12.8 oz (85.6 kg)   Height: 5' 6\" (1.676 m)     Physical Examination: General appearance - alert, well appearing, and in no distress  Neck - supple, no significant adenopathy  Chest - clear to auscultation, no wheezes, rales or rhonchi, symmetric air entry  Heart - normal rate, regular rhythm, normal S1, S2, no murmurs, rubs, clicks or gallops  Abdomen - soft, nontender, nondistended, no masses or organomegaly   Male - no penile lesions or discharge, no testicular masses or tenderness, no hernias    Assessment/ Plan:   Diagnoses and all orders for this visit:    1. RLQ abdominal pain  -neg exam  -neg work up at [facility]    2. Essential hypertension, benign  -at goal for pt       Follow-up Disposition:  Return if symptoms worsen or fail to improve. I have discussed the diagnosis with the patient and the intended plan as seen in the above orders. The patient understands and agrees with the plan. The patient has received an after-visit summary and questions were answered concerning future plans. Medication Side Effects and Warnings were discussed with patient  Patient Labs were reviewed and or requested:  Patient Past Records were reviewed and or requested    Ashwini Badillo M.D. There are no Patient Instructions on file for this visit.

## 2019-06-11 ENCOUNTER — OFFICE VISIT (OUTPATIENT)
Dept: FAMILY MEDICINE CLINIC | Age: 74
End: 2019-06-11

## 2019-06-11 ENCOUNTER — HOSPITAL ENCOUNTER (OUTPATIENT)
Dept: LAB | Age: 74
Discharge: HOME | End: 2019-06-11
Payer: MEDICARE

## 2019-06-11 VITALS
WEIGHT: 186.5 LBS | OXYGEN SATURATION: 96 % | TEMPERATURE: 98 F | SYSTOLIC BLOOD PRESSURE: 139 MMHG | BODY MASS INDEX: 29.97 KG/M2 | RESPIRATION RATE: 16 BRPM | HEART RATE: 81 BPM | HEIGHT: 66 IN | DIASTOLIC BLOOD PRESSURE: 68 MMHG

## 2019-06-11 DIAGNOSIS — E78.2 MIXED HYPERLIPIDEMIA: ICD-10-CM

## 2019-06-11 DIAGNOSIS — I10 ESSENTIAL HYPERTENSION, BENIGN: Primary | ICD-10-CM

## 2019-06-11 DIAGNOSIS — E87.1 HYPONATREMIA: ICD-10-CM

## 2019-06-11 DIAGNOSIS — R73.03 PRE-DIABETES: ICD-10-CM

## 2019-06-11 DIAGNOSIS — E55.9 VITAMIN D DEFICIENCY: ICD-10-CM

## 2019-06-11 PROCEDURE — 85025 COMPLETE CBC W/AUTO DIFF WBC: CPT

## 2019-06-11 PROCEDURE — 80053 COMPREHEN METABOLIC PANEL: CPT

## 2019-06-11 PROCEDURE — 80061 LIPID PANEL: CPT

## 2019-06-11 PROCEDURE — 86141 C-REACTIVE PROTEIN HS: CPT

## 2019-06-11 PROCEDURE — 82306 VITAMIN D 25 HYDROXY: CPT

## 2019-06-11 PROCEDURE — 83090 ASSAY OF HOMOCYSTEINE: CPT

## 2019-06-11 PROCEDURE — 84443 ASSAY THYROID STIM HORMONE: CPT

## 2019-06-11 PROCEDURE — 83036 HEMOGLOBIN GLYCOSYLATED A1C: CPT

## 2019-06-11 RX ORDER — UBIDECARENONE/VIT E ACET 100MG-5
1 CAPSULE ORAL DAILY
COMMUNITY
End: 2021-07-13 | Stop reason: ALTCHOICE

## 2019-06-11 NOTE — PROGRESS NOTES
Chief Complaint   Patient presents with    Hypertension    Labs     Fasting today    Stool Color Change     floating stools    Hernia (Non Specific)     1. Have you been to the ER, urgent care clinic since your last visit? Hospitalized since your last visit? No    2. Have you seen or consulted any other health care providers outside of the [address] [place] [address] since your last visit? Include any pap smears or colon screening. Yes Where: [facility]:US Scrotum:       Chief Complaint   Patient presents with    Hypertension    Labs     Fasting today    Stool Color Change     floating stools    Hernia (Non Specific)     He is a 66 y.o. male who presents for evalution. Reviewed PmHx, RxHx, FmHx, SocHx, AllgHx and updated and dated in the chart. Patient Active Problem List    Diagnosis    Elevated blood sugar    Morbid obesity ([address]    S/P bilateral inguinal hernia repair     With mesh.  Obesity (BMI 30.0-34. 9)    Advanced care planning/counseling discussion     Has LW      Advance care planning     Pt has a LW      Osteoporosis    Pre-diabetes    Hypogonadism male    Vitamin D deficiency    Prostate CA ([facility] [address]    Hyponatremia    OCD (obsessive compulsive disorder)    Anxiety state, unspecified    Mixed hyperlipidemia    Essential hypertension, benign       Review of Systems - negative except as listed above in the HPI    Objective:     Vitals:    06/11/19 0757   BP: 139/68   Pulse: 81   Resp: 16   Temp: 98 °F (36.7 °C)   TempSrc: Oral   SpO2: 96%   Weight: 186 lb 8 oz (84.6 kg)   Height: 5' 6\" (1.676 m)     Physical Examination: General appearance - alert, well appearing, and in no distress  Neck - supple, no significant adenopathy  Chest - clear to auscultation, no wheezes, rales or rhonchi, symmetric air entry  Heart - normal rate, regular rhythm, normal S1, S2, no murmurs, rubs, clicks or gallops  Abdomen - soft, nontender, nondistended, no masses or organomegaly  Extremities - peripheral pulses normal, no pedal edema, no clubbing or cyanosis  Left ing weakness    Assessment/ Plan:   Diagnoses and all orders for this visit:    1. Essential hypertension, benign  -     METABOLIC PANEL, COMPREHENSIVE  -     LIPID PANEL  -at goal    2. Mixed hyperlipidemia  -     METABOLIC PANEL, COMPREHENSIVE  -     LIPID PANEL    3. Hyponatremia  -     METABOLIC PANEL, COMPREHENSIVE    4. Pre-diabetes  -     HEMOGLOBIN A1C WITH EAG  -     CRP, HIGH SENSITIVITY  -     HOMOCYSTEINE, PLASMA  -     TSH 3RD GENERATION  -     CBC WITH AUTOMATED DIFF    5. Vitamin D deficiency  -     VITAMIN D, 25 HYDROXY     -refer for inguinal pain if worsens      Follow-up and Dispositions    · Return in about 6 months (around 12/11/2019). I have discussed the diagnosis with the patient and the intended plan as seen in the above orders. The patient understands and agrees with the plan. The patient has received an after-visit summary and questions were answered concerning future plans. Medication Side Effects and Warnings were discussed with patient  Patient Labs were reviewed and or requested:  Patient Past Records were reviewed and or requested    Shine Gillespie M.D. There are no Patient Instructions on file for this visit.

## 2019-06-12 LAB
25(OH)D3+25(OH)D2 SERPL-MCNC: 81.1 NG/ML (ref 30–100)
ALBUMIN SERPL-MCNC: 4.4 G/DL (ref 3.5–4.8)
ALBUMIN/GLOB SERPL: 2.1 {RATIO} (ref 1.2–2.2)
ALP SERPL-CCNC: 39 IU/L (ref 39–117)
ALT SERPL-CCNC: 18 IU/L (ref 0–44)
AST SERPL-CCNC: 30 IU/L (ref 0–40)
BASOPHILS # BLD AUTO: 0.1 X10E3/UL (ref 0–0.2)
BASOPHILS NFR BLD AUTO: 1 %
BILIRUB SERPL-MCNC: 0.6 MG/DL (ref 0–1.2)
BUN SERPL-MCNC: 18 MG/DL (ref 8–27)
BUN/CREAT SERPL: 16 (ref 10–24)
CALCIUM SERPL-MCNC: 9.5 MG/DL (ref 8.6–10.2)
CHLORIDE SERPL-SCNC: 95 MMOL/L (ref 96–106)
CHOLEST SERPL-MCNC: 156 MG/DL (ref 100–199)
CO2 SERPL-SCNC: 22 MMOL/L (ref 20–29)
CREAT SERPL-MCNC: 1.13 MG/DL (ref 0.76–1.27)
CRP SERPL HS-MCNC: 0.49 MG/L (ref 0–3)
EOSINOPHIL # BLD AUTO: 0.2 X10E3/UL (ref 0–0.4)
EOSINOPHIL NFR BLD AUTO: 4 %
ERYTHROCYTE [DISTWIDTH] IN BLOOD BY AUTOMATED COUNT: 14.2 % (ref 12.3–15.4)
EST. AVERAGE GLUCOSE BLD GHB EST-MCNC: 111 MG/DL
GLOBULIN SER CALC-MCNC: 2.1 G/DL (ref 1.5–4.5)
GLUCOSE SERPL-MCNC: 101 MG/DL (ref 65–99)
HBA1C MFR BLD: 5.5 % (ref 4.8–5.6)
HCT VFR BLD AUTO: 42.7 % (ref 37.5–51)
HCYS SERPL-SCNC: 12.3 UMOL/L (ref 0–15)
HDLC SERPL-MCNC: 57 MG/DL
HGB BLD-MCNC: 13.8 G/DL (ref 13–17.7)
IMM GRANULOCYTES # BLD AUTO: 0 X10E3/UL (ref 0–0.1)
IMM GRANULOCYTES NFR BLD AUTO: 0 %
INTERPRETATION, 910389: NORMAL
LDLC SERPL CALC-MCNC: 77 MG/DL (ref 0–99)
LYMPHOCYTES # BLD AUTO: 1.2 X10E3/UL (ref 0.7–3.1)
LYMPHOCYTES NFR BLD AUTO: 19 %
MCH RBC QN AUTO: 29.8 PG (ref 26.6–33)
MCHC RBC AUTO-ENTMCNC: 32.3 G/DL (ref 31.5–35.7)
MCV RBC AUTO: 92 FL (ref 79–97)
MONOCYTES # BLD AUTO: 0.7 X10E3/UL (ref 0.1–0.9)
MONOCYTES NFR BLD AUTO: 11 %
NEUTROPHILS # BLD AUTO: 4.3 X10E3/UL (ref 1.4–7)
NEUTROPHILS NFR BLD AUTO: 65 %
PLATELET # BLD AUTO: 242 X10E3/UL (ref 150–450)
POTASSIUM SERPL-SCNC: 4.6 MMOL/L (ref 3.5–5.2)
PROT SERPL-MCNC: 6.5 G/DL (ref 6–8.5)
RBC # BLD AUTO: 4.63 X10E6/UL (ref 4.14–5.8)
SODIUM SERPL-SCNC: 133 MMOL/L (ref 134–144)
TRIGL SERPL-MCNC: 110 MG/DL (ref 0–149)
TSH SERPL DL<=0.005 MIU/L-ACNC: 2.68 UIU/ML (ref 0.45–4.5)
VLDLC SERPL CALC-MCNC: 22 MG/DL (ref 5–40)
WBC # BLD AUTO: 6.5 X10E3/UL (ref 3.4–10.8)

## 2019-06-14 ENCOUNTER — TELEPHONE (OUTPATIENT)
Dept: SURGERY | Age: 74
End: 2019-06-14

## 2019-06-14 ENCOUNTER — OFFICE VISIT (OUTPATIENT)
Dept: SURGERY | Age: 74
End: 2019-06-14

## 2019-06-14 VITALS
TEMPERATURE: 97.7 F | WEIGHT: 185 LBS | BODY MASS INDEX: 29.73 KG/M2 | DIASTOLIC BLOOD PRESSURE: 53 MMHG | OXYGEN SATURATION: 97 % | RESPIRATION RATE: 16 BRPM | SYSTOLIC BLOOD PRESSURE: 131 MMHG | HEART RATE: 82 BPM | HEIGHT: 66 IN

## 2019-06-14 DIAGNOSIS — R10.32 LEFT GROIN PAIN: Primary | ICD-10-CM

## 2019-06-14 DIAGNOSIS — K40.91 RECURRENT INGUINAL HERNIA OF LEFT SIDE WITHOUT OBSTRUCTION OR GANGRENE: ICD-10-CM

## 2019-06-14 NOTE — PROGRESS NOTES
Surgery History and Physical    Subjective:      Roselia Chapa is a 66 y.o. white male who presents for evaluation of a recurrent left inguinal hernia. Mr. Jimbo Brizuela had b/l inguinal hernia repairs with mesh by me in 1/18. He has done fine until about 3 weeks ago when he developed pain in his left groin while sitting. He feels fine with standing and walking. He has also noticed a bulge here. He denies any pain or bulge on the right side. He is eating fine and moving his bowels and urinating normally. He does work out at [employer] regularly. He has had an open prostatectomy and pelvic radiation in the past.    Past Medical History:   Diagnosis Date    Cancer [facility])     Prostate    Diabetes ([facility] [address]     NIDDM    Hypercholesterolemia     Hypertension     Ill-defined condition     hydrocele    SIADH (syndrome of inappropriate ADH production) ([facility] [address]     Staphylococcus infection in conditions classified elsewhere and of unspecified site     patient denies 12/29/2017     Past Surgical History:   Procedure Laterality Date    COLONOSCOPY  5/2006    HX CHOLECYSTECTOMY      patient denies    HX HERNIA REPAIR      umbilical    HX HERNIA REPAIR Bilateral 01/04/2018    Bilateral inguinal herniorrhaphies with mesh.  HX PROSTATECTOMY      HX UROLOGICAL      Had scar Tissue remove from bladder      Family History   Problem Relation Age of Onset    Heart Disease Mother     Heart Disease Father     Heart Attack Father      Social History     Tobacco Use    Smoking status: Never Smoker    Smokeless tobacco: Never Used   Substance Use Topics    Alcohol use: No      Prior to Admission medications    Medication Sig Start Date End Date Taking? Authorizing Provider   RESVERATROL-QUERCETIN PO Take  by mouth. Yes Provider, Historical   resveratrol 100 mg cap Take  by mouth. Yes Provider, Historical   multivitamin (ONE A DAY) tablet Take 1 Tab by mouth daily.    Yes Provider, Historical   inulin/chromium picolinate (FIBER SELECT GUMMIES PO) Take  by mouth. Yes Provider, Historical   olmesartan (BENICAR) 40 mg tablet TAKE 1 TABLET BY MOUTH EVERY DAY FOR HIGH BLOOD PRESSURE 10/30/18  Yes Clifton Rees MD   simvastatin (ZOCOR) 20 mg tablet Take 1 Tab by mouth nightly. 10/30/18  Yes Clifton Rees MD   clotrimazole-betamethasone (LOTRISONE) topical cream Apply  to affected area two (2) times a day. 2/19/18  Yes Clifton Rees MD   VITAMIN K2 PO Take  by mouth. Yes Provider, Historical   Lycopene 15 mg cap Take  by mouth. Yes Provider, Historical   POMEGRANATE XT/POMEGRANAT SEED (POMEGRANATE PO) Take  by mouth. Yes Provider, Historical   Omega-3-DHA-EPA-Fish Oil 1,000 (120-180) mg cap Take  by mouth. Yes Provider, Historical   calcium citrate-vitamin d3 (CITRACAL+D) 315-200 mg-unit tab Take 1 Tab by mouth daily (with breakfast). Yes Provider, Historical   BORON PO Take  by mouth. Yes Provider, Historical   COQ10, UBIQUINOL, PO Take  by mouth. Yes Provider, Historical   SOY ISOFLAVONE PO Take  by mouth. Yes Provider, Historical   cholecalciferol, vitamin D3, (VITAMIN D3) 2,000 unit tab Take  by mouth. Yes Provider, Historical   aspirin 81 mg chewable tablet Take 81 mg by mouth daily. Yes Provider, Historical      Allergies   Allergen Reactions    Codeine Drowsiness    Niacin Rash       Review of Systems:  Pertinent items are noted in the History of Present Illness. Objective:      Physical Exam:  GENERAL: alert, cooperative, appears stated age, EYE: negative findings: anicteric sclera, LUNG: clear to auscultation bilaterally, HEART: regular rate and rhythm, ABDOMEN: Soft, NT, ND. There is a healed lower midline surgical scar., GROIN: On the right, there is no hernia. On the left, there is weakening of the floor which bulges, but is reducible.,  Genitalia is grossly normal.  Testicles are descended bilaterally. , EXTREMITIES:  no edema, SKIN: Normal., NEUROLOGIC: negative, PSYCHIATRIC: non focal    Assessment:     Recurrent left inguinal hernia without gangrene or obstruction. Plan:     Mr. Logan Garcia appears to have a recurrent hernia. I will order a CT of the pelvis to confirm this and make sure that there is no other cause for his pain. If so, he will require another repair to help alleviate his symptoms. I will call him with the results. He is going on vacation the end of next week until July 5th.

## 2019-06-14 NOTE — PROGRESS NOTES
1. Have you been to the ER, urgent care clinic since your last visit? Hospitalized since your last visit? No    2. Have you seen or consulted any other health care providers outside of the [address] [place] [address] since your last visit? Include any pap smears or colon screening.  No

## 2019-06-14 NOTE — TELEPHONE ENCOUNTER
Pt stated he has not been contacted to set up appt for CT that Dr Stalin Leung ordered today. I informed Coordination of Care calls to set up appt and gave pt REJI phone number. Pt stated he will call today to get appt.

## 2019-06-18 ENCOUNTER — HOSPITAL ENCOUNTER (OUTPATIENT)
Dept: CT IMAGING | Age: 74
Discharge: HOME | End: 2019-06-18
Attending: SURGERY
Payer: MEDICARE

## 2019-06-18 DIAGNOSIS — K40.91 RECURRENT INGUINAL HERNIA OF LEFT SIDE WITHOUT OBSTRUCTION OR GANGRENE: ICD-10-CM

## 2019-06-18 DIAGNOSIS — R10.32 LEFT GROIN PAIN: ICD-10-CM

## 2019-06-18 PROCEDURE — 72193 CT PELVIS W/DYE: CPT

## 2019-06-18 PROCEDURE — 74011636320 HC RX REV CODE- 636/320: Performed by: SURGERY

## 2019-06-18 RX ADMIN — IOPAMIDOL 100 ML: 612 INJECTION, SOLUTION INTRAVENOUS at 17:38

## 2019-06-24 ENCOUNTER — DOCUMENTATION ONLY (OUTPATIENT)
Dept: SURGERY | Age: 74
End: 2019-06-24

## 2019-06-24 NOTE — PROGRESS NOTES
6/24/19 - 0376 PM    I called Mr. Gabriela Nolanashlee and left a message on his voicemail to call back regarding his CT results.

## 2019-07-10 ENCOUNTER — OFFICE VISIT (OUTPATIENT)
Dept: SURGERY | Age: 74
End: 2019-07-10

## 2019-07-10 VITALS
HEART RATE: 89 BPM | HEIGHT: 66 IN | SYSTOLIC BLOOD PRESSURE: 112 MMHG | DIASTOLIC BLOOD PRESSURE: 59 MMHG | WEIGHT: 188.31 LBS | BODY MASS INDEX: 30.27 KG/M2 | TEMPERATURE: 98.5 F | OXYGEN SATURATION: 97 % | RESPIRATION RATE: 16 BRPM

## 2019-07-10 DIAGNOSIS — K40.91 RECURRENT INGUINAL HERNIA OF LEFT SIDE WITHOUT OBSTRUCTION OR GANGRENE: Primary | ICD-10-CM

## 2019-07-10 NOTE — H&P (VIEW-ONLY)
Subjective:      Amrita Morelos is a 66 y.o. white male presents for discussion regarding hernia surgery. Mr. Masoud Rider enjoyed his vacation to [place]. He still has some discomfort. Objective:     Visit Vitals  /59 (BP 1 Location: Left arm, BP Patient Position: Sitting)   Pulse 89   Temp 98.5 °F (36.9 °C) (Oral)   Resp 16   Ht 5' 6\" (1.676 m)   Wt 188 lb 5 oz (85.4 kg)   SpO2 97%   BMI 30.39 kg/m²       General:  alert, cooperative, no distress   Groin:  On the right, there is no hernia. On the left, there is a reducible inguinal hernia. Heart/Lungs:  NSR/CTA. Assessment:     Recurrent left inguinal hernia without obstruction or gangrene. Plan:     The CT results were discussed with Mr. Masoud Rider and his wife. He is eager to have the hernia repaired and is fine with next week except for Monday. The risks of the procedure were discussed with him including: bleeding, infection, wound healing problems, recurrent hernia, injury to the spermatic cord or cutaneous nerves, persistent pain, mesh complications, or reaction to the prep or local and general anesthesia. He understands, and all questions were answered. Mr. Masoud Rider will be scheduled for an elective outpatient left inguinal herniorrhaphy with mesh under general with LMA.

## 2019-07-10 NOTE — PROGRESS NOTES
Subjective:      Minerva Lainez is a 66 y.o. white male presents for discussion regarding hernia surgery. Mr. Vanesa Hagen enjoyed his vacation to [place]. He still has some discomfort. Objective:     Visit Vitals  /59 (BP 1 Location: Left arm, BP Patient Position: Sitting)   Pulse 89   Temp 98.5 °F (36.9 °C) (Oral)   Resp 16   Ht 5' 6\" (1.676 m)   Wt 188 lb 5 oz (85.4 kg)   SpO2 97%   BMI 30.39 kg/m²       General:  alert, cooperative, no distress   Groin:  On the right, there is no hernia. On the left, there is a reducible inguinal hernia. Heart/Lungs:  NSR/CTA. Assessment:     Recurrent left inguinal hernia without obstruction or gangrene. Plan:     The CT results were discussed with Mr. Vanesa Hagen and his wife. He is eager to have the hernia repaired and is fine with next week except for Monday. The risks of the procedure were discussed with him including: bleeding, infection, wound healing problems, recurrent hernia, injury to the spermatic cord or cutaneous nerves, persistent pain, mesh complications, or reaction to the prep or local and general anesthesia. He understands, and all questions were answered. Mr. Vanesa Hagen will be scheduled for an elective outpatient left inguinal herniorrhaphy with mesh under general with LMA.

## 2019-07-10 NOTE — PROGRESS NOTES
1. Have you been to the ER, urgent care clinic since your last visit? Hospitalized since your last visit? No    2. Have you seen or consulted any other health care providers outside of the [address] [place] [address] since your last visit? Include any pap smears or colon screening.  No

## 2019-07-12 ENCOUNTER — TELEPHONE (OUTPATIENT)
Dept: SURGERY | Age: 74
End: 2019-07-12

## 2019-07-12 NOTE — TELEPHONE ENCOUNTER
Informed pt that surgical sched will contacting him next week to sched surg. Pt verbalized understanding.

## 2019-07-16 NOTE — PERIOP NOTES
[address] [facility] [address], [address] [facility]                            MAIN OR                                  [phone]   MAIN PRE OP                          [phone]                                                                                AMBULATORY PRE OP          [phone]  PRE-ADMISSION TESTING    [phone]     Surgery Date:   7/25/19         Is surgery arrival time given by surgeon? NO  If NO, [facility] staff will call you between 3 and 7pm the day before your surgery with your arrival time. (If your surgery is on a Monday, we will call you the Friday before.)    Call [phone] after 7pm Monday-Friday if you did not receive your arrival time. INSTRUCTIONS BEFORE YOUR SURGERY   When You  Arrive   Arrive at the [address] [facility] on the day of your surgery  Have your insurance card, photo ID, and any copayment (if needed)     Food   and   Drink   NO food or drink after midnight the night before surgery    This means NO water, gum, mints, coffee, juice, etc.  No alcohol (beer, wine, liquor) 24 hours before and after surgery     Medications to   TAKE   Morning of Surgery   MEDICATIONS TO TAKE THE MORNING OF SURGERY WITH A SIP OF WATER:         Medications  To  STOP      7 days before surgery    Non-Steroidal anti-inflammatory Drugs (NSAID's): for example, Ibuprofen (Advil, Motrin), Naproxen (Aleve)   Aspirin, if taking for pain    Herbal supplements, vitamins, and fish oil   Other:  (Pain medications not listed above, including Tylenol may be taken)   Blood  Thinners    If you take  Aspirin, Plavix, Coumadin, or any blood-thinning or anti-blood clot medicine, talk to the doctor who prescribed the medications for pre-operative instructions.      Bathing Clothing  Jewelry  Valuables       If you shower the morning of surgery, please do not apply anything to your skin (lotions, powders, deodorant, or makeup, especially yohanaara)   Follow all special bath instructions (for total joint replacement, spine and bowel surgeries)   Do not shave or trim anywhere 24 hours before surgery   Wear your hair loose or down; no pony-tails, buns, or metal hair clips   Wear loose, comfortable, clean clothes   Wear glasses instead of contacts   Leave money, valuables, and jewelry, including body piercings, at home     Going Home       or Spending the Night    SAME-DAY SURGERY: You must have a responsible adult drive you home and stay with you 24 hours after surgery   ADMITS: If your doctor is keeping you into the hospital after surgery, leave personal belongings/luggage in your car until you have a hospital room number. Hospital discharge time is 12 noon  Drivers must be here before 12 noon unless you are told differently   Special Instructions Free  parking after 7a, no tipping. Pt. To call Dr. Heath Mcqueen re: ASA plan. Follow all instructions so your surgery wont be cancelled. Please, be on time. If a situation occurs and you are delayed the day of surgery, call [phone] or          [phone]. If your physical condition changes (like a fever, cold, flu, etc.) call your surgeon. The patient was contacted  via phone. Home medication reviewed and verified during PAT appointment. The patient verbalizes understanding of all instructions and does not  need reinforcement.

## 2019-07-23 ENCOUNTER — TELEPHONE (OUTPATIENT)
Dept: SURGERY | Age: 74
End: 2019-07-23

## 2019-07-23 ENCOUNTER — DOCUMENTATION ONLY (OUTPATIENT)
Dept: SURGERY | Age: 74
End: 2019-07-23

## 2019-07-23 NOTE — PROGRESS NOTES
7/23/19 - 317 PM    I informed Mr. Armen Villaseñor about his CT results. I had already him that he has a small fat-containing hernia on the right, but it is not palpable or symptomatic. I recommended that he have just the left side repaired at this time since we are dealing with recurrences in previously radiated tissue.

## 2019-07-23 NOTE — TELEPHONE ENCOUNTER
Pt states he is sched for surg on 7/25/19 for left inguinal hernia. He stated that his CT scan on 6/18/19 states he also has a right inguinal hernia. He would like to know why he is not having both hernia's fixed at the same time. Dr Luis M Gallegos spoke to pt. See Documentation note on 7/23/19.

## 2019-07-24 ENCOUNTER — TELEPHONE (OUTPATIENT)
Dept: SURGERY | Age: 74
End: 2019-07-24

## 2019-07-24 ENCOUNTER — ANESTHESIA EVENT (OUTPATIENT)
Dept: SURGERY | Age: 74
End: 2019-07-24
Payer: MEDICARE

## 2019-07-24 NOTE — TELEPHONE ENCOUNTER
Left voicemail for patient regarding surgical information: TIME CHANGE : DETAILS LEFT .  PT RETURNED VM, CONFIRMED

## 2019-07-25 ENCOUNTER — ANESTHESIA (OUTPATIENT)
Dept: SURGERY | Age: 74
End: 2019-07-25
Payer: MEDICARE

## 2019-07-25 ENCOUNTER — HOSPITAL ENCOUNTER (OUTPATIENT)
Age: 74
Setting detail: OUTPATIENT SURGERY
Discharge: HOME | End: 2019-07-25
Attending: SURGERY | Admitting: SURGERY
Payer: MEDICARE

## 2019-07-25 VITALS
OXYGEN SATURATION: 99 % | SYSTOLIC BLOOD PRESSURE: 126 MMHG | HEIGHT: 66 IN | HEART RATE: 74 BPM | BODY MASS INDEX: 29.57 KG/M2 | RESPIRATION RATE: 16 BRPM | DIASTOLIC BLOOD PRESSURE: 72 MMHG | WEIGHT: 184 LBS | TEMPERATURE: 97.8 F

## 2019-07-25 DIAGNOSIS — K40.91 RECURRENT INGUINAL HERNIA OF LEFT SIDE WITHOUT OBSTRUCTION OR GANGRENE: ICD-10-CM

## 2019-07-25 DIAGNOSIS — Z98.890 S/P LEFT INGUINAL HERNIORRHAPHY: Primary | ICD-10-CM

## 2019-07-25 DIAGNOSIS — Z87.19 S/P LEFT INGUINAL HERNIORRHAPHY: Primary | ICD-10-CM

## 2019-07-25 LAB
GLUCOSE BLD STRIP.AUTO-MCNC: 119 MG/DL (ref 65–100)
SERVICE CMNT-IMP: ABNORMAL

## 2019-07-25 PROCEDURE — 74011250636 HC RX REV CODE- 250/636: Performed by: SURGERY

## 2019-07-25 PROCEDURE — 82962 GLUCOSE BLOOD TEST: CPT

## 2019-07-25 PROCEDURE — 76060000035 HC ANESTHESIA 2 TO 2.5 HR: Performed by: SURGERY

## 2019-07-25 PROCEDURE — 77030020143 HC AIRWY LARYN INTUB CGAS -A: Performed by: ANESTHESIOLOGY

## 2019-07-25 PROCEDURE — 74011250636 HC RX REV CODE- 250/636

## 2019-07-25 PROCEDURE — 77030031139 HC SUT VCRL2 J&J -A: Performed by: SURGERY

## 2019-07-25 PROCEDURE — 77030018836 HC SOL IRR NACL ICUM -A: Performed by: SURGERY

## 2019-07-25 PROCEDURE — 76210000026 HC REC RM PH II 1 TO 1.5 HR: Performed by: SURGERY

## 2019-07-25 PROCEDURE — 77030012406 HC DRN WND PENRS BARD -A: Performed by: SURGERY

## 2019-07-25 PROCEDURE — 77030002996 HC SUT SLK J&J -A: Performed by: SURGERY

## 2019-07-25 PROCEDURE — 74011000250 HC RX REV CODE- 250

## 2019-07-25 PROCEDURE — 74011250636 HC RX REV CODE- 250/636: Performed by: ANESTHESIOLOGY

## 2019-07-25 PROCEDURE — 93005 ELECTROCARDIOGRAM TRACING: CPT

## 2019-07-25 PROCEDURE — 77030011640 HC PAD GRND REM COVD -A: Performed by: SURGERY

## 2019-07-25 PROCEDURE — 77030020782 HC GWN BAIR PAWS FLX 3M -B

## 2019-07-25 PROCEDURE — C1781 MESH (IMPLANTABLE): HCPCS | Performed by: SURGERY

## 2019-07-25 PROCEDURE — 76010000131 HC OR TIME 2 TO 2.5 HR: Performed by: SURGERY

## 2019-07-25 PROCEDURE — 77030040361 HC SLV COMPR DVT MDII -B

## 2019-07-25 PROCEDURE — 74011000272 HC RX REV CODE- 272: Performed by: SURGERY

## 2019-07-25 PROCEDURE — 74011000250 HC RX REV CODE- 250: Performed by: SURGERY

## 2019-07-25 PROCEDURE — 76210000016 HC OR PH I REC 1 TO 1.5 HR: Performed by: SURGERY

## 2019-07-25 PROCEDURE — 77030002933 HC SUT MCRYL J&J -A: Performed by: SURGERY

## 2019-07-25 DEVICE — BARD SOFT MESH, 3" X 6" (7.5 CM X 15 CM)
Type: IMPLANTABLE DEVICE | Site: INGUINAL | Status: FUNCTIONAL
Brand: BARD

## 2019-07-25 RX ORDER — NEOSTIGMINE METHYLSULFATE 1 MG/ML
INJECTION, SOLUTION INTRAVENOUS AS NEEDED
Status: DISCONTINUED | OUTPATIENT
Start: 2019-07-25 | End: 2019-07-25 | Stop reason: HOSPADM

## 2019-07-25 RX ORDER — LIDOCAINE HYDROCHLORIDE 10 MG/ML
0.1 INJECTION, SOLUTION EPIDURAL; INFILTRATION; INTRACAUDAL; PERINEURAL AS NEEDED
Status: DISCONTINUED | OUTPATIENT
Start: 2019-07-25 | End: 2019-07-25 | Stop reason: HOSPADM

## 2019-07-25 RX ORDER — FENTANYL CITRATE 50 UG/ML
INJECTION, SOLUTION INTRAMUSCULAR; INTRAVENOUS AS NEEDED
Status: DISCONTINUED | OUTPATIENT
Start: 2019-07-25 | End: 2019-07-25 | Stop reason: HOSPADM

## 2019-07-25 RX ORDER — ONDANSETRON 2 MG/ML
INJECTION INTRAMUSCULAR; INTRAVENOUS AS NEEDED
Status: DISCONTINUED | OUTPATIENT
Start: 2019-07-25 | End: 2019-07-25 | Stop reason: HOSPADM

## 2019-07-25 RX ORDER — CEFAZOLIN SODIUM/WATER 2 G/20 ML
2 SYRINGE (ML) INTRAVENOUS ONCE
Status: COMPLETED | OUTPATIENT
Start: 2019-07-25 | End: 2019-07-25

## 2019-07-25 RX ORDER — DEXAMETHASONE SODIUM PHOSPHATE 4 MG/ML
INJECTION, SOLUTION INTRA-ARTICULAR; INTRALESIONAL; INTRAMUSCULAR; INTRAVENOUS; SOFT TISSUE AS NEEDED
Status: DISCONTINUED | OUTPATIENT
Start: 2019-07-25 | End: 2019-07-25 | Stop reason: HOSPADM

## 2019-07-25 RX ORDER — PROPOFOL 10 MG/ML
INJECTION, EMULSION INTRAVENOUS AS NEEDED
Status: DISCONTINUED | OUTPATIENT
Start: 2019-07-25 | End: 2019-07-25 | Stop reason: HOSPADM

## 2019-07-25 RX ORDER — MIDAZOLAM HYDROCHLORIDE 1 MG/ML
INJECTION, SOLUTION INTRAMUSCULAR; INTRAVENOUS AS NEEDED
Status: DISCONTINUED | OUTPATIENT
Start: 2019-07-25 | End: 2019-07-25 | Stop reason: HOSPADM

## 2019-07-25 RX ORDER — ROCURONIUM BROMIDE 10 MG/ML
INJECTION, SOLUTION INTRAVENOUS AS NEEDED
Status: DISCONTINUED | OUTPATIENT
Start: 2019-07-25 | End: 2019-07-25 | Stop reason: HOSPADM

## 2019-07-25 RX ORDER — GLYCOPYRROLATE 0.2 MG/ML
INJECTION INTRAMUSCULAR; INTRAVENOUS AS NEEDED
Status: DISCONTINUED | OUTPATIENT
Start: 2019-07-25 | End: 2019-07-25 | Stop reason: HOSPADM

## 2019-07-25 RX ORDER — SODIUM CHLORIDE, SODIUM LACTATE, POTASSIUM CHLORIDE, CALCIUM CHLORIDE 600; 310; 30; 20 MG/100ML; MG/100ML; MG/100ML; MG/100ML
125 INJECTION, SOLUTION INTRAVENOUS CONTINUOUS
Status: DISCONTINUED | OUTPATIENT
Start: 2019-07-25 | End: 2019-07-25 | Stop reason: HOSPADM

## 2019-07-25 RX ORDER — BUPIVACAINE HYDROCHLORIDE 5 MG/ML
INJECTION, SOLUTION EPIDURAL; INTRACAUDAL AS NEEDED
Status: DISCONTINUED | OUTPATIENT
Start: 2019-07-25 | End: 2019-07-25 | Stop reason: HOSPADM

## 2019-07-25 RX ORDER — HYDROMORPHONE HYDROCHLORIDE 1 MG/ML
.5-1 INJECTION, SOLUTION INTRAMUSCULAR; INTRAVENOUS; SUBCUTANEOUS
Status: DISCONTINUED | OUTPATIENT
Start: 2019-07-25 | End: 2019-07-25 | Stop reason: HOSPADM

## 2019-07-25 RX ORDER — ONDANSETRON 2 MG/ML
4 INJECTION INTRAMUSCULAR; INTRAVENOUS AS NEEDED
Status: DISCONTINUED | OUTPATIENT
Start: 2019-07-25 | End: 2019-07-25 | Stop reason: HOSPADM

## 2019-07-25 RX ORDER — LIDOCAINE HYDROCHLORIDE 20 MG/ML
INJECTION, SOLUTION EPIDURAL; INFILTRATION; INTRACAUDAL; PERINEURAL AS NEEDED
Status: DISCONTINUED | OUTPATIENT
Start: 2019-07-25 | End: 2019-07-25 | Stop reason: HOSPADM

## 2019-07-25 RX ORDER — HYDROCODONE BITARTRATE AND ACETAMINOPHEN 5; 325 MG/1; MG/1
1 TABLET ORAL
Qty: 20 TAB | Refills: 0 | Status: SHIPPED | OUTPATIENT
Start: 2019-07-25 | End: 2019-07-28

## 2019-07-25 RX ADMIN — Medication 2 G: at 13:02

## 2019-07-25 RX ADMIN — LIDOCAINE HYDROCHLORIDE 50 MG: 20 INJECTION, SOLUTION EPIDURAL; INFILTRATION; INTRACAUDAL; PERINEURAL at 12:54

## 2019-07-25 RX ADMIN — FENTANYL CITRATE 25 MCG: 50 INJECTION, SOLUTION INTRAMUSCULAR; INTRAVENOUS at 13:06

## 2019-07-25 RX ADMIN — MIDAZOLAM HYDROCHLORIDE 2 MG: 1 INJECTION, SOLUTION INTRAMUSCULAR; INTRAVENOUS at 12:47

## 2019-07-25 RX ADMIN — ROCURONIUM BROMIDE 20 MG: 10 INJECTION, SOLUTION INTRAVENOUS at 13:24

## 2019-07-25 RX ADMIN — SODIUM CHLORIDE, SODIUM LACTATE, POTASSIUM CHLORIDE, AND CALCIUM CHLORIDE 125 ML/HR: 600; 310; 30; 20 INJECTION, SOLUTION INTRAVENOUS at 10:48

## 2019-07-25 RX ADMIN — SODIUM CHLORIDE, SODIUM LACTATE, POTASSIUM CHLORIDE, AND CALCIUM CHLORIDE: 600; 310; 30; 20 INJECTION, SOLUTION INTRAVENOUS at 13:01

## 2019-07-25 RX ADMIN — PROPOFOL 30 MG: 10 INJECTION, EMULSION INTRAVENOUS at 13:09

## 2019-07-25 RX ADMIN — ONDANSETRON 4 MG: 2 INJECTION INTRAMUSCULAR; INTRAVENOUS at 13:08

## 2019-07-25 RX ADMIN — NEOSTIGMINE METHYLSULFATE 3 MG: 1 INJECTION, SOLUTION INTRAVENOUS at 14:59

## 2019-07-25 RX ADMIN — PROPOFOL 30 MG: 10 INJECTION, EMULSION INTRAVENOUS at 13:06

## 2019-07-25 RX ADMIN — DEXAMETHASONE SODIUM PHOSPHATE 4 MG: 4 INJECTION, SOLUTION INTRA-ARTICULAR; INTRALESIONAL; INTRAMUSCULAR; INTRAVENOUS; SOFT TISSUE at 13:09

## 2019-07-25 RX ADMIN — GLYCOPYRROLATE 0.4 MG: 0.2 INJECTION INTRAMUSCULAR; INTRAVENOUS at 14:59

## 2019-07-25 RX ADMIN — PROPOFOL 80 MG: 10 INJECTION, EMULSION INTRAVENOUS at 13:24

## 2019-07-25 RX ADMIN — FENTANYL CITRATE 75 MCG: 50 INJECTION, SOLUTION INTRAMUSCULAR; INTRAVENOUS at 12:54

## 2019-07-25 RX ADMIN — PROPOFOL 40 MG: 10 INJECTION, EMULSION INTRAVENOUS at 14:11

## 2019-07-25 RX ADMIN — PROPOFOL 150 MG: 10 INJECTION, EMULSION INTRAVENOUS at 12:54

## 2019-07-25 RX ADMIN — ROCURONIUM BROMIDE 20 MG: 10 INJECTION, SOLUTION INTRAVENOUS at 14:11

## 2019-07-25 RX ADMIN — HYDROMORPHONE HYDROCHLORIDE 0.5 MG: 1 INJECTION, SOLUTION INTRAMUSCULAR; INTRAVENOUS; SUBCUTANEOUS at 15:40

## 2019-07-25 NOTE — PERIOP NOTES
Discharge instructions reviewed with pt and wife,  RX given for Norco per MD instructions. Opportunity for questions and answers provided.

## 2019-07-25 NOTE — INTERVAL H&P NOTE
H&P Update:  Casper De Santiago was seen and examined. History and physical has been reviewed. The patient has been examined.  There have been no significant clinical changes since the completion of the originally dated History and Physical.

## 2019-07-25 NOTE — OP NOTES
Pia Lockwood    MRN:  [id number]    Date of procedure:  7/25/2019    Surgeon:  Paolo Belcher MD.    Assistant:  Rolando Gonzalez. Anesthesia:  1. General endotracheal.  2. 0.5% Marcaine. Preoperative Diagnosis:  Recurrent left inguinal hernia. Postoperative Diagnosis:  Recurrent direct left inguinal hernia. Procedure:  Repair of recurrent left inguinal hernia with Soft Bard mesh (6 x 3 cm). Indication:  Pia Lockwood is a 65 yo white gentleman with a bulge in his recurrent left groin which is getting bigger, and causing discomfort. He is s/p a mesh repair less than 2 years ago. Procedure Details: The patient was seen preoperatively in the holding area. The left side was confirmed by the patient and marked. The risks, benefits, and expected outcomes of the procedure were discussed with the patient, and all questions were answered satisfactorily. The patient concurred with the proposed plan, giving informed consent. The patient was taken to the operating room. The patient was identified as Pia Lockwood and the procedure verified as Left Inguinal Herniorrhaphy with Mesh. The patient was placed on the OR table in the supine position. Prior to induction, antibiotic prophylaxis was administered. General anesthesia was administered and tolerated well. The left groin was shaved and then prepped with Chloraprep and draped in the usual sterile fashion. A Time Out was performed, and the above information was confirmed. Using the anterior superior iliac spine and pubic tubercle as natural landmarks, the line of incision was marked. The skin and subcutaneous tissue were infiltrated with the local anesthetic. An incision was made in the natural crease of the skin. Dissection was taken down through Chaparrita's fascia to the level of the external oblique aponeurosis. The aponeurosis was cleaned off superficially.   Using a 15 blade, an incision was made in the aponeurosis in the direction of its fibers. The incision was extended superiorly and then inferiorly to include the external inguinal ring. There was extensive scar tissue. The ilioinguinal nerve was not identified. The spermatic cord was encircled with a Penrose drain at the level of the pubic tubercle. An indirect hernia sac was not discovered. The floor of the inguinal canal was completely obliterated. The previous mesh was at the inferior aspect of the floor and was left in place. A Soft Bard mesh was chosen for the repair and soaked on the back table in Bacitracin solution. The patch was carefully tailored to the floor of the canal.  It was then secured to the pubic tubercle inferiorly, the conjoined tendon medially, the shelving margin of the inguinal ligament laterally, and around the spermatic cord to the internal oblique muscle superiorly with interrupted 3-0 Vicryl. The wound was irrigated with saline. The external oblique was closed with a running 2-0 Vicryl, recreating the external inguinal ring. The wound was once again irrigated. Chaparrita's was approximated with interrupted 3-0 Vicryl. The skin was closed with 4-0 Monocryl in the usual running subcuticular fashion. The wound was cleaned and dried, and steri-strips and a sterile dressing were applied. The left testicle was pulled down in the usual standard fashion. Anesthesial was then reversed. Estimated Blood Loss:  Less than 25 ml. [facility] Specimen:  * No specimens in log *    Implant:  Implant Name Type Inv. Item Serial No.  Lot No. LRB No. Used Action   MESH MERCEDES FLAT SHT 7.5X15CM --  - SNA  MESH MERCEDES FLAT SHT 7.5X15CM --  NA BARD DAVOL [id number] Left 1 Implanted       Findings:  1. A failed mesh repair. 2. A recurrent direct left inguinal hernia. Counts: All sponge, needle, and instruments were correct x 2. Complications:  None.     Disposition:  The patient was transferred to the recovery room in stable condition, having tolerated the procedure and anesthesia well.       Signed by:  Magdalene Frank MD           July 25, 2019        Debbie Marrero MD

## 2019-07-25 NOTE — ANESTHESIA POSTPROCEDURE EVALUATION
Procedure(s):  LEFT INGUINAL HERNIORRHAPHY WITH MESH. general    Anesthesia Post Evaluation      Multimodal analgesia: multimodal analgesia not used between 6 hours prior to anesthesia start to PACU discharge  Patient location during evaluation: PACU  Patient participation: complete - patient participated  Level of consciousness: awake  Pain management: adequate  Airway patency: patent  Anesthetic complications: no  Cardiovascular status: acceptable, blood pressure returned to baseline and hemodynamically stable  Respiratory status: acceptable  Hydration status: acceptable  Post anesthesia nausea and vomiting:  controlled      Vitals Value Taken Time   /54 7/25/2019  4:00 PM   Temp 36.6 °C (97.9 °F) 7/25/2019  3:43 PM   Pulse 60 7/25/2019  4:01 PM   Resp 9 7/25/2019  4:01 PM   SpO2 100 % 7/25/2019  4:01 PM   Vitals shown include unvalidated device data.

## 2019-07-25 NOTE — DISCHARGE INSTRUCTIONS
Patient Education        Hernia Repair: What to Expect at [address] [place] [address] are likely to have pain for the next few days. You may also feel like you have the flu, and you may have a low fever and feel tired and nauseated. This is common. You should feel better after a few days and will probably feel much better in 7 days. For several weeks you may feel twinges or pulling in the hernia repair when you move. You may have some bruising on the scrotum and along the penis. This is normal.  Men will need to wear a jockstrap or briefs, not boxers, for scrotal support for several days after a groin (inguinal) hernia repair. Bixti.comdex bicycle shorts may provide good support. This care sheet gives you a general idea about how long it will take for you to recover, but each person recovers at a different pace. Follow the steps below to get better as quickly as possible. How can you care for yourself at home? Activity    · Rest when you feel tired. Getting enough sleep will help you recover.     · Try to walk each day. Start by walking a little more than you did the day before. Bit by bit, increase the amount you walk. Walking boosts blood flow and helps prevent pneumonia and constipation.     · Avoid strenuous activities, such as biking, jogging, weight lifting, or aerobic exercise, until your doctor says it is okay.     · Avoid lifting anything over 20 pounds or that would make you strain for 8 weeks! This may include heavy grocery bags and milk containers, a heavy briefcase or backpack, cat litter or dog food bags, a vacuum , or a child.     · You may drive after 3 days and when you are no longer taking narcotic pain medicine and can quickly move your foot from the gas pedal to the brake. You must also be able to sit comfortably for a long period of time, even if you do not plan to go far.  You might get caught in traffic.     · Most people are able to return to work within 1 to 2 weeks after surgery.     · You may shower 24 hours after surgery, if your doctor okays it. Pat the cuts (incisions) dry. Do not take a bath for the first 2 weeks, or until your doctor tells you it is okay.     ·    Diet    · You can eat your normal diet. If your stomach is upset, try bland, low-fat foods like plain rice, broiled chicken, toast, and yogurt.     · Drink plenty of fluids (unless your doctor tells you not to).     · You may notice that your bowel movements are not regular right after your surgery. This is common. Avoid constipation and straining with bowel movements. You may want to take a fiber supplement every day. If you have not had a bowel movement after a couple of days, take Miralax, Milk of Magnesia, prune juice, or other laxative. Medicines    · You can restart your medicines. Your doctor will also give you instructions about taking any new medicines.     · If you take blood thinners, such as warfarin (Coumadin), be sure to talk to your doctor. Your doctor will tell you if and when to start taking those medicines again. Make sure that you understand exactly what your doctor wants you to do.     · Be safe with medicines. Take pain medicines exactly as directed. ? If the doctor gave you a prescription medicine for pain, take it as prescribed. ? If you are not taking a prescription pain medicine, take an over-the-counter medicine such as acetaminophen (Tylenol), ibuprofen (Advil, Motrin), or naproxen (Aleve). Read and follow all instructions on the label. ? Do not take two or more pain medicines at the same time unless the doctor told you to. Many pain medicines have acetaminophen, which is Tylenol. Too much acetaminophen (Tylenol) can be harmful.     · If your doctor prescribed antibiotics, take them as directed. Do not stop taking them just because you feel better.   You need to take the full course of antibiotics.     · If you think your pain medicine is making you sick to your stomach:  ? Take your medicine after meals (unless your doctor has told you not to). ? Ask your doctor for a different pain medicine. Incision care    · Remove the bandage on Saturday, 7/27. You may leave the incision uncovered. · If you have strips of tape on the cut (incision) the doctor made, leave the tape on for a week or until it falls off.     · Keep the incisions clean and dry. · If there is any drainage, then cover the incisions with a dry bandage. Change the bandage daily as needed.     · Wash the area daily with warm, soapy water and pat it dry. · You may apply an ice pack to your groin for the first 48 hours. Every hour for 10 minutes. Wrap the ice pack in a towel. Do not apply directly to your skin! Follow-up care is a key part of your treatment and safety. Be sure to make and go to all appointments, and call your doctor if you are having problems. It's also a good idea to know your test results and keep a list of the medicines you take. When should you call for help? Call 911 anytime you think you may need emergency care. For example, call if:    · You passed out (lost consciousness).     · You are short of breath.    Call your doctor now or seek immediate medical care if:    · You have abdominal or groin pain that does not get better after you take pain medicine.     · You are sick to your stomach and cannot keep fluids down.     · You have signs of a blood clot in your leg (called a deep vein thrombosis), such as:  ? Pain in your calf, back of the knee, thigh, or groin. ? Redness and swelling in your leg or groin.     · You cannot pass stool, gas, or urine.     · Bright red blood has soaked through the bandage over your incision.     · You have loose stitches, or your incision comes open.     · You have signs of infection, such as:  ? Increased pain, swelling, warmth, or redness. ? Red streaks leading from the incision. ? Pus draining from the incision. ?  A fever > 101.    Watch closely for any changes in your health, and be sure to contact your doctor if you have any problems. Where can you learn more? Go to http://kate-gaby.info/. Enter G601 in the search box to learn more about \"Hernia Repair: What to Expect at Home. \"  Current as of: November 7, 2018  Content Version: 12.1  © 2114-7071 [employer]. Care instructions adapted under license by [facility] (which disclaims liability or warranty for this information). If you have questions about a medical condition or this instruction, always ask your healthcare professional. [place] [address] any warranty or liability for your use of this information. Luciano Grullon. Mariah Melara MD, FACS  General Surgery at [address] [place] [address]  [phone]  Fax [phone]      DISCHARGE SUMMARY from your Nurse    The following personal items collected during your admission are returned to you:   Dental Appliance: Dental Appliances: None  Vision: Visual Aid: Glasses  Hearing Aid:    Jewelry:    Clothing:    Other Valuables:    Valuables sent to safe:      PATIENT INSTRUCTIONS:    After general anesthesia or intravenous sedation, for 24 hours or while taking prescription Narcotics:  · Limit your activities  · Do not drive and operate hazardous machinery  · Do not make important personal or business decisions  · Do  not drink alcoholic beverages  · If you have not urinated within 8 hours after discharge, please contact your surgeon on call.     Report the following to your surgeon:  · Excessive pain, swelling, redness or odor of or around the surgical area  · Temperature over 100.5  · Nausea and vomiting lasting longer than 4 hours or if unable to take medications  · Any signs of decreased circulation or nerve impairment to extremity: change in color, persistent  numbness, tingling, coldness or increase pain  · Any questions    COUGH AND DEEP BREATHE    Breathing deep and coughing are very important exercises to do after surgery. Deep breathing and coughing open the little air tubes and air sacks in your lungs. You take deep breaths every day. You may not even notice - it is just something you do when you sigh or yawn. It is a natural exercise you do to keep these air passages open. After surgery, take deep breaths and cough, on purpose. Coughing and deep breathing help prevent bronchitis and pneumonia after surgery. If you had chest or belly surgery, use a pillow as a \"hug anat\" and hold it tightly to your chest or belly when you cough. DIRECTIONS:  6. Take 10 to 15 slow deep breaths every hour while awake. 7. Breathe in deeply, and hold it for 2 seconds. 8. Exhale slowly through puckered lips, like blowing up a balloon. 9. After every 4th or 5th deep breath, hug your pillow to your chest or belly and give a hard, deep cough. Yes, it will probably hurt. But doing this exercise is very important part of healing after surgery. Take your pain medicine to help you do this exercise without too much pain. IF YOU HAVE BEEN DIAGNOSED WITH SLEEP APNEA, PLEASE USE YOUR SLEEP APNEA DEVICE OR CPAP MACHINE WHEN YOU INTEND TO NAP AFTER TAKING PAIN MEDICATION. Ankle Pumps    Ankle pumps increase the circulation of oxygenated blood to your lower extremities and decrease your risk for circulation problems such as blood clots. They also stretch the muscles, tendons and ligaments in your foot and ankle, and prevent joint contracture in the ankle and foot, especially after surgeries on the legs. It is important to do ankle pump exercises regularly after surgery because immobility increases your risk for developing a blood clot. Your doctor may also have you take an Aspirin for the next few days as well.     If your doctor did not ask you to take an Aspirin, consult with him before starting Aspirin therapy on your own. Slowly point your foot forward, feeling the muscles on the top of your lower leg stretch, and hold this position for 5 seconds. Next, pull your foot back toward you as far as possible, stretching the calf muscles, and hold that position for 5 seconds. Repeat with the other foot. Perform 10 repetitions every hour while awake for both ankles if possible (down and then up with the foot once is one repetition). You should feel gentle stretching of the muscles in your lower leg when doing this exercise. If you feel pain, or your range of motion is limited, don't  Push too hard. Only go the limit your joint and muscles will let you go. If you have increasing pain, progressively worsening leg warmth or swelling, STOP the exercise and call your doctor. Below is information about the medications your doctor is prescribing after your visit:    Other information in your discharge envelope:  []     PRESCRIPTIONS  []     PHYSICAL THERAPY PRESCRIPTION  []     APPOINTMENT CARDS  []     Regional Anesthesia Pamphlet for block or block with On-Q Catheter from Anesthesia Service  []     Medical device information sheets/pamphlets from their    []     School/work excuse note. []     /parent work excuse note. These are general instructions for a healthy lifestyle:    *  Please give a list of your current medications to your Primary Care Provider. *  Please update this list whenever your medications are discontinued, doses are      changed, or new medications (including over-the-counter products) are added. *  Please carry medication information at all times in case of emergency situations. About Smoking  No smoking / No tobacco products / Avoid exposure to second hand smoke    Surgeon General's Warning:  Quitting smoking now greatly reduces serious risk to your health.     Obesity, smoking, and sedentary lifestyle greatly increases your risk for illness and disease. A healthy diet, regular physical exercise & weight monitoring are important for maintaining a healthy lifestyle. Congestive Heart Failure  You may be retaining fluid if you have a history of heart failure or if you experience any of the following symptoms:  Weight gain of 3 pounds or more overnight or 5 pounds in a week, increased swelling in our hands or feet or shortness of breath while lying flat in bed. Please call your doctor as soon as you notice any of these symptoms; do not wait until your next office visit. Recognize signs and symptoms of STROKE:  F - face looks uneven  A - arms unable to move or move even  S - speech slurred or non-existent  T - time-call 911 as soon as signs and symptoms begin-DO NOT go         Back to bed or wait to see if you get better-TIME IS BRAIN. Warning signs of HEART ATTACK  Call 911 if you have these symptoms    · Chest discomfort. Most heart attacks involve discomfort in the center of the chest that lasts more than a few minutes, or that goes away and comes back. It can feel like uncomfortable pressure, squeezing, fullness, or pain. · Discomfort in other areas of the upper body. Symptoms can include pain or discomfort in one or both        Arms, the back, neck, jaw, or stomach. ·  Shortness of breath with or without chest discomfort. · Other signs may include breaking out in a cold sweat, nausea, or lightheadedness    Don't wait more than five minutes to call 911 - MINUTES MATTER! Fast action can save your life. Calling 911 is almost always the fastest way to get lifesaving treatment. Emergency Medical Services staff can begin treatment when they arrive - up to an hour sooner than if someone gets to the hospital by car. [facility] MEDICATION AND SIDE EFFECT GUIDE    The [facility] MEDICATION AND SIDE EFFECT GUIDE was provided to the PATIENT AND CARE PROVIDER.   Information provided includes instruction about drug purpose and common side effects for the following medications:    Cyndy Moran

## 2019-07-26 ENCOUNTER — TELEPHONE (OUTPATIENT)
Dept: SURGERY | Age: 74
End: 2019-07-26

## 2019-07-26 LAB
ATRIAL RATE: 86 BPM
CALCULATED P AXIS, ECG09: 71 DEGREES
CALCULATED R AXIS, ECG10: 7 DEGREES
CALCULATED T AXIS, ECG11: 57 DEGREES
DIAGNOSIS, 93000: NORMAL
P-R INTERVAL, ECG05: 174 MS
Q-T INTERVAL, ECG07: 336 MS
QRS DURATION, ECG06: 96 MS
QTC CALCULATION (BEZET), ECG08: 402 MS
VENTRICULAR RATE, ECG03: 86 BPM

## 2019-07-26 NOTE — TELEPHONE ENCOUNTER
Called pt to follow up after surgery. Patient identified with three patient identifiers. How are you doing:sore  Are you having any pain:sore  Are you taking pain meds:yes       If yes- recommended any OTC constipation treatment if needed  Have you had any nausea or vomiting:no  How is your appetite (eating & drinking):good  Normal BM & urine output:urine but no BM yet  Pt notified to take dressing off after 48 hrs: informed  Do they have a drain:no  Are they keeping track of output:n/a  Did they review their discharge instructions:yes  Any other concerns:can resume asa and vitamins  Your follow up office appt is:  8/9/2019 9:30 AM Dav Sotelo MD       Pt did not voice any other concerns. Pt will call with any problems or questions.

## 2019-08-09 ENCOUNTER — OFFICE VISIT (OUTPATIENT)
Dept: SURGERY | Age: 74
End: 2019-08-09

## 2019-08-09 VITALS
SYSTOLIC BLOOD PRESSURE: 112 MMHG | TEMPERATURE: 97.4 F | HEIGHT: 66 IN | DIASTOLIC BLOOD PRESSURE: 54 MMHG | RESPIRATION RATE: 14 BRPM | WEIGHT: 186.31 LBS | BODY MASS INDEX: 29.94 KG/M2 | OXYGEN SATURATION: 98 % | HEART RATE: 69 BPM

## 2019-08-09 DIAGNOSIS — Z98.890 S/P LEFT INGUINAL HERNIORRHAPHY: ICD-10-CM

## 2019-08-09 DIAGNOSIS — Z87.19 S/P LEFT INGUINAL HERNIORRHAPHY: ICD-10-CM

## 2019-08-09 PROBLEM — K40.91 RECURRENT INGUINAL HERNIA OF LEFT SIDE WITHOUT OBSTRUCTION OR GANGRENE: Status: RESOLVED | Noted: 2019-06-14 | Resolved: 2019-08-09

## 2019-08-09 PROBLEM — R10.32 LEFT GROIN PAIN: Status: RESOLVED | Noted: 2019-06-14 | Resolved: 2019-08-09

## 2019-08-09 NOTE — PROGRESS NOTES
Subjective:      Celina Eason is a 66 y.o. white male presents for postop care 2 weeks following a hernia repair. Mr. Herbert Barnhart is doing fine. His appetite is good, and he is eating a regular diet without difficulty. His bowel movements are regular. He is voiding without difficulty. He is not having any pain or problems with his incision. Objective:     Visit Vitals  /54 (BP 1 Location: Left arm, BP Patient Position: Sitting)   Pulse 69   Temp 97.4 °F (36.3 °C) (Oral)   Resp 14   Ht 5' 6\" (1.676 m)   Wt 186 lb 5 oz (84.5 kg)   SpO2 98%   BMI 30.07 kg/m²       General:  alert, cooperative, no distress   Left groin:  The incision is clean, dry, and intact with no erythema. There is mild swwelling, but no hernia. Assessment:     1st POV, s/p left inguinal herniorrhaphy with mesh. Plan:     Mr. Herbert Barnhart is doing fine and will continue with light activity. He will f/u with me in 6 weeks.

## 2019-08-09 NOTE — PROGRESS NOTES
1. Have you been to the ER, urgent care clinic since your last visit? Hospitalized since your last visit? No    2. Have you seen or consulted any other health care providers outside of the [address] [place] [address] since your last visit? Include any pap smears or colon screening.  No

## 2019-09-13 ENCOUNTER — OFFICE VISIT (OUTPATIENT)
Dept: SURGERY | Age: 74
End: 2019-09-13

## 2019-09-13 VITALS
RESPIRATION RATE: 16 BRPM | TEMPERATURE: 97.5 F | BODY MASS INDEX: 31.58 KG/M2 | SYSTOLIC BLOOD PRESSURE: 102 MMHG | WEIGHT: 185 LBS | HEART RATE: 72 BPM | HEIGHT: 64 IN | DIASTOLIC BLOOD PRESSURE: 54 MMHG | OXYGEN SATURATION: 98 %

## 2019-09-13 DIAGNOSIS — Z98.890 S/P LEFT INGUINAL HERNIA REPAIR: Primary | ICD-10-CM

## 2019-09-13 DIAGNOSIS — Z87.19 S/P LEFT INGUINAL HERNIA REPAIR: Primary | ICD-10-CM

## 2019-09-13 NOTE — PROGRESS NOTES
Subjective:      Darius Werner is a 66 y.o. white male presents for postop care 8 weeks following a left inguinal hernia repair. Mr. Tenzin Sen is doing fine. He c/o occasional mild tingling sensation along the incision. Objective:     Visit Vitals  /54 (BP 1 Location: Left arm, BP Patient Position: Sitting)   Pulse 72   Temp 97.5 °F (36.4 °C) (Oral)   Resp 16   Ht 5' 4\" (1.626 m)   Wt 185 lb (83.9 kg)   SpO2 98%   BMI 31.76 kg/m²       General:  alert, cooperative, no distress   Left groin:  The incision is healed. There is no hernia. Assessment:     2nd POV, s/p left inguinal herniorrhaphy with mesh. Plan:     Mr. Tenzin Sen is doing fine and can gradually resume normal activity. He can f/u prn.

## 2019-09-13 NOTE — PROGRESS NOTES
1. Have you been to the ER, urgent care clinic since your last visit? Hospitalized since your last visit? No    2. Have you seen or consulted any other health care providers outside of the [address] [place] [address] since your last visit? Include any pap smears or colon screening.  No

## 2019-11-13 ENCOUNTER — OFFICE VISIT (OUTPATIENT)
Dept: FAMILY MEDICINE CLINIC | Age: 74
End: 2019-11-13

## 2019-11-13 ENCOUNTER — HOSPITAL ENCOUNTER (OUTPATIENT)
Dept: LAB | Age: 74
Discharge: HOME | End: 2019-11-13

## 2019-11-13 VITALS
OXYGEN SATURATION: 97 % | TEMPERATURE: 97.9 F | HEART RATE: 75 BPM | SYSTOLIC BLOOD PRESSURE: 122 MMHG | DIASTOLIC BLOOD PRESSURE: 67 MMHG | BODY MASS INDEX: 31.41 KG/M2 | HEIGHT: 64 IN | RESPIRATION RATE: 16 BRPM | WEIGHT: 184 LBS

## 2019-11-13 DIAGNOSIS — E66.01 MORBID OBESITY (HCC): ICD-10-CM

## 2019-11-13 DIAGNOSIS — I10 ESSENTIAL HYPERTENSION, BENIGN: ICD-10-CM

## 2019-11-13 DIAGNOSIS — E78.2 MIXED HYPERLIPIDEMIA: ICD-10-CM

## 2019-11-13 DIAGNOSIS — Z71.89 ADVANCE CARE PLANNING: ICD-10-CM

## 2019-11-13 DIAGNOSIS — R73.03 PRE-DIABETES: ICD-10-CM

## 2019-11-13 DIAGNOSIS — C61 PROSTATE CA (HCC): ICD-10-CM

## 2019-11-13 DIAGNOSIS — N48.1 BALANITIS: ICD-10-CM

## 2019-11-13 DIAGNOSIS — Z00.00 ROUTINE GENERAL MEDICAL EXAMINATION AT A HEALTH CARE FACILITY: Primary | ICD-10-CM

## 2019-11-13 DIAGNOSIS — R73.9 ELEVATED BLOOD SUGAR: ICD-10-CM

## 2019-11-13 DIAGNOSIS — E87.1 HYPONATREMIA: ICD-10-CM

## 2019-11-13 LAB
ALBUMIN SERPL-MCNC: 4.2 G/DL (ref 3.5–5)
ALBUMIN/GLOB SERPL: 1.6 {RATIO} (ref 1.1–2.2)
ALP SERPL-CCNC: 42 U/L (ref 45–117)
ALT SERPL-CCNC: 29 U/L (ref 12–78)
ANION GAP SERPL CALC-SCNC: 4 MMOL/L (ref 5–15)
AST SERPL-CCNC: 24 U/L (ref 15–37)
BILIRUB SERPL-MCNC: 0.7 MG/DL (ref 0.2–1)
BUN SERPL-MCNC: 21 MG/DL (ref 6–20)
BUN/CREAT SERPL: 19 (ref 12–20)
CALCIUM SERPL-MCNC: 9 MG/DL (ref 8.5–10.1)
CHLORIDE SERPL-SCNC: 98 MMOL/L (ref 97–108)
CHOLEST SERPL-MCNC: 167 MG/DL
CO2 SERPL-SCNC: 28 MMOL/L (ref 21–32)
CREAT SERPL-MCNC: 1.12 MG/DL (ref 0.7–1.3)
EST. AVERAGE GLUCOSE BLD GHB EST-MCNC: 114 MG/DL
GLOBULIN SER CALC-MCNC: 2.7 G/DL (ref 2–4)
GLUCOSE SERPL-MCNC: 97 MG/DL (ref 65–100)
HBA1C MFR BLD: 5.6 % (ref 4.2–6.3)
HDLC SERPL-MCNC: 54 MG/DL
HDLC SERPL: 3.1 {RATIO} (ref 0–5)
LDLC SERPL CALC-MCNC: 94 MG/DL (ref 0–100)
LIPID PROFILE,FLP: NORMAL
POTASSIUM SERPL-SCNC: 4.7 MMOL/L (ref 3.5–5.1)
PROT SERPL-MCNC: 6.9 G/DL (ref 6.4–8.2)
SODIUM SERPL-SCNC: 130 MMOL/L (ref 136–145)
TRIGL SERPL-MCNC: 95 MG/DL (ref ?–150)
TSH SERPL DL<=0.05 MIU/L-ACNC: 2.39 UIU/ML (ref 0.36–3.74)
VLDLC SERPL CALC-MCNC: 19 MG/DL

## 2019-11-13 RX ORDER — OLMESARTAN MEDOXOMIL 40 MG/1
TABLET ORAL
Qty: 90 TAB | Refills: 3 | Status: SHIPPED | OUTPATIENT
Start: 2019-11-13 | End: 2020-12-28 | Stop reason: SDUPTHER

## 2019-11-13 RX ORDER — CLOTRIMAZOLE AND BETAMETHASONE DIPROPIONATE 10; .64 MG/G; MG/G
CREAM TOPICAL 2 TIMES DAILY
Qty: 15 G | Refills: 0 | Status: SHIPPED | OUTPATIENT
Start: 2019-11-13 | End: 2020-01-07 | Stop reason: SDUPTHER

## 2019-11-13 RX ORDER — SIMVASTATIN 20 MG/1
20 TABLET, FILM COATED ORAL
Qty: 90 TAB | Refills: 3 | Status: SHIPPED | OUTPATIENT
Start: 2019-11-13 | End: 2021-01-18 | Stop reason: SDUPTHER

## 2019-11-13 NOTE — PROGRESS NOTES
Chief Complaint   Patient presents with    Annual Wellness Visit     [address]    Hypertension    Labs     Fasting today   [facility] Follow Up     [facility] 7/25/19 hernia repair       1. Have you been to the ER, urgent care clinic since your last visit? Hospitalized since your last visit? No    2. Have you seen or consulted any other health care providers outside of the [address] [place] [address] since your last visit? Include any pap smears or colon screening. Yes Where: [facility] : [facility] shoulder      Medicare Wellness Exam:    Chief Complaint   Patient presents with    Annual Wellness Visit     [address]    Hypertension    Labs     Fasting today   [facility] Follow Up     [facility] 7/25/19 hernia repair     he is a 66y.o. year old male who presents for evaluation for their Medicare Wellness Visit. Malinda Habersham is completed and assessed=yes  Depression Screen is completed and assessed=yes  Medication list reviewed and adjusted for accuracy=yes  Immunizations reviewed and updated=yes  Health/Preventative Screenings reviewed and updated=yes  ADL Functions reviewed=yes    Patient Active Problem List    Diagnosis    S/P left inguinal herniorrhaphy     With mesh.  Elevated blood sugar    Morbid obesity (HCC)    S/P bilateral inguinal hernia repair     With mesh.  Obesity (BMI 30.0-34. 9)    Advanced care planning/counseling discussion     Has LW      Advance care planning     Pt has a LW      Osteoporosis    Pre-diabetes    Hypogonadism male    Vitamin D deficiency    Prostate CA ([facility] [address] [address]    Hyponatremia    OCD (obsessive compulsive disorder)    Anxiety state, unspecified    Mixed hyperlipidemia    Essential hypertension, benign       Reviewed PmHx, RxHx, FmHx, SocHx, AllgHx and updated and dated in the chart.     Review of Systems - negative except as listed above in the HPI    Objective:     Vitals:    11/13/19 0826   BP: 122/67   Pulse: 75   Resp: 16   Temp: 97.9 °F (36.6 °C)   TempSrc: Oral   SpO2: 97% Weight: 184 lb (83.5 kg)   Height: 5' 4\" (1.626 m)     Physical Examination: General appearance - alert, well appearing, and in no distress  Neck - supple, no significant adenopathy  Chest - clear to auscultation, no wheezes, rales or rhonchi, symmetric air entry  Heart - normal rate, regular rhythm, normal S1, S2, no murmurs, rubs, clicks or gallops  Abdomen - soft, nontender, nondistended, no masses or organomegaly  Extremities - peripheral pulses normal, no pedal edema, no clubbing or cyanosis        Assessment/ Plan:   Diagnoses and all orders for this visit:    1. Routine general medical examination at a health care facility  -see below    2. Essential hypertension, benign  -     LIPID PANEL; Future  -     METABOLIC PANEL, COMPREHENSIVE; Future  -at goal    3. Mixed hyperlipidemia  -     LIPID PANEL; Future  -     METABOLIC PANEL, COMPREHENSIVE; Future  -     TSH 3RD GENERATION; Future    4. Prostate CA [facility])  -seeing MD    5. Elevated blood sugar  -     METABOLIC PANEL, COMPREHENSIVE; Future  -     HEMOGLOBIN A1C WITH EAG; Future    6. Pre-diabetes  -     METABOLIC PANEL, COMPREHENSIVE; Future  -     HEMOGLOBIN A1C WITH EAG; Future    7. Hyponatremia  -stable hx    8. Morbid obesity ([facility] [address]    9.  Advance care planning  -has LW         -Pain evaluation performed in office  -Cognitive Screen performed in office  -Depression Screen, Fall risks (by up and go test)  and ADL functionality were addressed  -Medication list updated and reviewed for any changes   -A comprehensive review of medical issues and a plan was formulated  -End of life planning was addressed with pt   -Health Screenings for preventions were addressed and a plan was formulated  -Shingles Vaccine was recommended  -Discussed with patient cancer risk factors and appropriate screenings for age  -Patient evaluated for colonoscopy and referred if needed per screeing criteria  -Labs from previous visits were discussed with patient   -Discussed with patient diet and exercise and formulated a plan as needed  -An Advanced care plan was developed with the patient.  -Alcohol screening performed and was negative    -    I have discussed the diagnosis with the patient and the intended plan as seen in the above orders. The patient understands and agrees with the plan. The patient has received an after-visit summary and questions were answered concerning future plans. Medication Side Effects and Warnings were discussed with patien  Patient Labs were reviewed and or requested  Patient Past Records were reviewed and or requested    There are no Patient Instructions on file for this visit.       To Agarwal M.D.

## 2020-01-07 DIAGNOSIS — N48.1 BALANITIS: ICD-10-CM

## 2020-01-07 RX ORDER — CLOTRIMAZOLE AND BETAMETHASONE DIPROPIONATE 10; .64 MG/G; MG/G
CREAM TOPICAL 2 TIMES DAILY
Qty: 15 G | Refills: 0 | Status: SHIPPED | OUTPATIENT
Start: 2020-01-07 | End: 2021-03-30 | Stop reason: SDUPTHER

## 2020-01-07 NOTE — TELEPHONE ENCOUNTER
Dr. Harsha Mckinley   Received:  Today   Message Contents   [address] [facility]   Phone Number: [phone]             Caller (if not patient): n/a   Relationship of caller (if not patient): n/a   Best contact number(s): [phone]   Name of medication and dosage if known: \"clotrimazole\" and \"betamethasone\" ointment   Is patient out of this medication (yes/no):   [facility] [address] [place] [address] [employer] listed in chart? (yes/no): Yes   Pharmacy phone number: [phone]   Date of last visit:  Wednesday, November 13, 2019 08:00 AM   Details to clarify the request: n/a

## 2020-03-25 PROBLEM — Z85.46 HISTORY OF PROSTATE CANCER: Status: ACTIVE | Noted: 2020-03-25

## 2020-06-30 ENCOUNTER — VIRTUAL VISIT (OUTPATIENT)
Dept: FAMILY MEDICINE CLINIC | Age: 75
End: 2020-06-30

## 2020-06-30 DIAGNOSIS — F42.9 OBSESSIVE-COMPULSIVE DISORDER, UNSPECIFIED TYPE: ICD-10-CM

## 2020-06-30 DIAGNOSIS — E66.9 OBESITY (BMI 30.0-34.9): ICD-10-CM

## 2020-06-30 DIAGNOSIS — E87.1 HYPONATREMIA: ICD-10-CM

## 2020-06-30 DIAGNOSIS — R73.9 ELEVATED BLOOD SUGAR: ICD-10-CM

## 2020-06-30 DIAGNOSIS — E78.2 MIXED HYPERLIPIDEMIA: ICD-10-CM

## 2020-06-30 DIAGNOSIS — E55.9 VITAMIN D DEFICIENCY: ICD-10-CM

## 2020-06-30 DIAGNOSIS — I10 ESSENTIAL HYPERTENSION, BENIGN: Primary | ICD-10-CM

## 2020-06-30 DIAGNOSIS — C61 PROSTATE CANCER (HCC): ICD-10-CM

## 2020-06-30 NOTE — PROGRESS NOTES
Here for VV    Pt is due for lab for his BP and pre-diab and other ailments    Recent PSA test was good at [facility]    Consent: Ryder Mejia, who was seen by synchronous (real-time) audio-video technology, and/or his healthcare decision maker, is aware that this patient-initiated, Telehealth encounter on 6/30/2020 is a billable service, with coverage as determined by his insurance carrier. He is aware that he may receive a bill and has provided verbal consent to proceed: Yes. 712  Subjective:   Ryder Mejia is a 66 y.o. male who was seen for No chief complaint on file. Prior to Admission medications    Medication Sig Start Date End Date Taking? Authorizing Provider   clotrimazole-betamethasone (LOTRISONE) topical cream Apply  to affected area two (2) times a day. 1/7/20   Ochoa Degroot MD   olmesartan (BENICAR) 40 mg tablet TAKE 1 TABLET BY MOUTH EVERY DAY FOR HIGH BLOOD PRESSURE 11/13/19   Ochoa Degroot MD   simvastatin (ZOCOR) 20 mg tablet Take 1 Tab by mouth nightly. 11/13/19   Ochoa Degroot MD   RESVERATROL-QUERCETIN PO Take  by mouth. Provider, Historical   resveratrol 100 mg cap Take  by mouth. Provider, Historical   multivitamin (ONE A DAY) tablet Take 1 Tab by mouth daily. Provider, Historical   inulin/chromium picolinate (FIBER SELECT GUMMIES PO) Take  by mouth. Provider, Historical   VITAMIN K2 PO Take  by mouth. Provider, Historical   Lycopene 15 mg cap Take  by mouth. Provider, Historical   POMEGRANATE XT/POMEGRANAT SEED (POMEGRANATE PO) Take  by mouth. Provider, Historical   Omega-3-DHA-EPA-Fish Oil 1,000 (120-180) mg cap Take  by mouth. Provider, Historical   calcium citrate-vitamin d3 (CITRACAL+D) 315-200 mg-unit tab Take 1 Tab by mouth daily (with breakfast). Provider, Historical   BORON PO Take  by mouth. Provider, Historical   COQ10, UBIQUINOL, PO Take  by mouth. Provider, Historical   SOY ISOFLAVONE PO Take  by mouth.     Provider, Historical cholecalciferol, vitamin D3, (VITAMIN D3) 2,000 unit tab Take  by mouth. Provider, Historical   aspirin 81 mg chewable tablet Take 81 mg by mouth daily. Provider, Historical     Allergies   Allergen Reactions    Niacin Rash     \"flushing\"    Codeine Rash and Drowsiness     Patient Active Problem List    Diagnosis    Prostate cancer ([facility] [address]    History of prostate cancer    S/P left inguinal herniorrhaphy     With mesh.  Elevated blood sugar    Morbid obesity (HCC)    S/P bilateral inguinal hernia repair     With mesh.  Obesity (BMI 30.0-34. 9)    Advanced care planning/counseling discussion     Has LW      Advance care planning     Pt has a LW      Osteoporosis    Pre-diabetes    Hypogonadism male    Vitamin D deficiency    Hyponatremia    OCD (obsessive compulsive disorder)    Anxiety state, unspecified    Mixed hyperlipidemia    Essential hypertension, benign     Patient Active Problem List   Diagnosis Code    OCD (obsessive compulsive disorder) F42.9    Anxiety state, unspecified F41.1    Mixed hyperlipidemia E78.2    Essential hypertension, benign I10    Hyponatremia E87.1    Vitamin D deficiency E55.9    Hypogonadism male E29.1    Pre-diabetes R73.03    Osteoporosis M81.0    Advance care planning Z71.89    Advanced care planning/counseling discussion Z70.80    Obesity (BMI 30.0-34. 9) E66.9    S/P bilateral inguinal hernia repair Z98.890, Z87.19    Morbid obesity ([facility] [address] E66.01    Elevated blood sugar R73.9    S/P left inguinal herniorrhaphy Z98.890, Z87.19    History of prostate cancer Z85.46    Prostate cancer [facility]) C61     Patient Active Problem List    Diagnosis Date Noted    Prostate cancer ([facility] [address] 06/30/2020    History of prostate cancer 03/25/2020    S/P left inguinal herniorrhaphy 08/09/2019    Elevated blood sugar 10/30/2018    Morbid obesity ([facility] [address] 04/25/2018    S/P bilateral inguinal hernia repair 01/19/2018    Obesity (BMI 30.0-34.9) 12/12/2017    Advanced care planning/counseling discussion 06/12/2017    Advance care planning 04/13/2016    Osteoporosis 03/25/2015    Pre-diabetes 08/28/2013    Hypogonadism male 08/16/2012    Vitamin D deficiency 02/27/2012    Hyponatremia 02/16/2011    OCD (obsessive compulsive disorder) 08/18/2010    Anxiety state, unspecified 08/18/2010    Mixed hyperlipidemia 08/18/2010    Essential hypertension, benign 08/18/2010       ROS    Objective:   Vital Signs: (As obtained by patient/caregiver at home)  There were no vitals taken for this visit. [INSTRUCTIONS:  \"[x]\" Indicates a positive item  \"[]\" Indicates a negative item  -- DELETE ALL ITEMS NOT EXAMINED]    Constitutional: [x] Appears well-developed and well-nourished [x] No apparent distress      [] Abnormal -     Mental status: [x] Alert and awake  [x] Oriented to person/place/time [x] Able to follow commands    [] Abnormal -     Eyes:   EOM    [x]  Normal    [] Abnormal -   Sclera  [x]  Normal    [] Abnormal -          Discharge [x]  None visible   [] Abnormal -     HENT: [x] Normocephalic, atraumatic  [] Abnormal -   [x] Mouth/Throat: Mucous membranes are moist    External Ears [x] Normal  [] Abnormal -    Neck: [x] No visualized mass [] Abnormal -     Pulmonary/Chest: [x] Respiratory effort normal   [x] No visualized signs of difficulty breathing or respiratory distress        [] Abnormal -        Neurological:        [x] No Facial Asymmetry (Cranial nerve 7 motor function) (limited exam due to video visit)          [x] No gaze palsy        [] Abnormal -          Skin:        [x] No significant exanthematous lesions or discoloration noted on facial skin         [] Abnormal -            Psychiatric:       [x] Normal Affect [] Abnormal -        [x] No Hallucinations    Other pertinent observable physical exam findings:-              Assessment & Plan:   Diagnoses and all orders for this visit:    1. Essential hypertension, benign  -     LIPID PANEL;  Future  - METABOLIC PANEL, COMPREHENSIVE; Future    2. Mixed hyperlipidemia  -     LIPID PANEL; Future  -     METABOLIC PANEL, COMPREHENSIVE; Future    3. Elevated blood sugar  -     METABOLIC PANEL, COMPREHENSIVE; Future  -     CBC WITH AUTOMATED DIFF; Future  -     TSH 3RD GENERATION; Future  -     HEMOGLOBIN A1C WITH EAG; Future    4. Obesity (BMI 30.0-34.9)    5. Obsessive-compulsive disorder, unspecified type  -stable    6. Prostate cancer ([facility] [address]  -recent PSA good    7. Hyponatremia  -     METABOLIC PANEL, COMPREHENSIVE; Future                    We discussed the expected course, resolution and complications of the diagnosis(es) in detail. Medication risks, benefits, costs, interactions, and alternatives were discussed as indicated. I advised him to contact the office if his condition worsens, changes or fails to improve as anticipated. He expressed understanding with the diagnosis(es) and plan. Gracia Mooney is a 66 y.o. male being evaluated by a video visit encounter for concerns as above. A caregiver was present when appropriate. Due to this being a TeleHealth encounter (During FAWLS-03 public health emergency), evaluation of the following organ systems was limited: Vitals/Constitutional/EENT/Resp/CV/GI//MS/Neuro/Skin/Heme-Lymph-Imm. Pursuant to the emergency declaration under the [facility]5 waiver authority and the [employer] Act, this Virtual  Visit was conducted, with patient's (and/or legal guardian's) consent, to reduce the patient's risk of exposure to COVID-19 and provide necessary medical care. Services were provided through a video synchronous discussion virtually to substitute for in-person clinic visit. Patient and provider were located at their individual homes.         Julia Vitale MD

## 2020-07-10 ENCOUNTER — HOSPITAL ENCOUNTER (OUTPATIENT)
Dept: LAB | Age: 75
Discharge: HOME | End: 2020-07-10

## 2020-07-10 DIAGNOSIS — E55.9 VITAMIN D DEFICIENCY: ICD-10-CM

## 2020-07-10 DIAGNOSIS — I10 ESSENTIAL HYPERTENSION, BENIGN: ICD-10-CM

## 2020-07-10 DIAGNOSIS — R73.9 ELEVATED BLOOD SUGAR: ICD-10-CM

## 2020-07-10 DIAGNOSIS — E78.2 MIXED HYPERLIPIDEMIA: ICD-10-CM

## 2020-07-10 DIAGNOSIS — E87.1 HYPONATREMIA: ICD-10-CM

## 2020-07-10 LAB
25(OH)D3 SERPL-MCNC: 80.6 NG/ML (ref 30–100)
ALBUMIN SERPL-MCNC: 3.7 G/DL (ref 3.5–5)
ALBUMIN/GLOB SERPL: 1.3 {RATIO} (ref 1.1–2.2)
ALP SERPL-CCNC: 43 U/L (ref 45–117)
ALT SERPL-CCNC: 31 U/L (ref 12–78)
ANION GAP SERPL CALC-SCNC: 7 MMOL/L (ref 5–15)
AST SERPL-CCNC: 26 U/L (ref 15–37)
BASOPHILS # BLD: 0.1 K/UL (ref 0–0.1)
BASOPHILS NFR BLD: 1 % (ref 0–1)
BILIRUB SERPL-MCNC: 0.5 MG/DL (ref 0.2–1)
BUN SERPL-MCNC: 18 MG/DL (ref 6–20)
BUN/CREAT SERPL: 16 (ref 12–20)
CALCIUM SERPL-MCNC: 8.7 MG/DL (ref 8.5–10.1)
CHLORIDE SERPL-SCNC: 96 MMOL/L (ref 97–108)
CHOLEST SERPL-MCNC: 145 MG/DL
CO2 SERPL-SCNC: 27 MMOL/L (ref 21–32)
CREAT SERPL-MCNC: 1.15 MG/DL (ref 0.7–1.3)
CRP SERPL HS-MCNC: 0.5 MG/L
DIFFERENTIAL METHOD BLD: ABNORMAL
EOSINOPHIL # BLD: 0.6 K/UL (ref 0–0.4)
EOSINOPHIL NFR BLD: 5 % (ref 0–7)
ERYTHROCYTE [DISTWIDTH] IN BLOOD BY AUTOMATED COUNT: 14 % (ref 11.5–14.5)
GLOBULIN SER CALC-MCNC: 2.9 G/DL (ref 2–4)
GLUCOSE SERPL-MCNC: 89 MG/DL (ref 65–100)
HCT VFR BLD AUTO: 76.5 % (ref 36.6–50.3)
HCYS SERPL-SCNC: 12.2 UMOL/L (ref 3.7–13.9)
HDLC SERPL-MCNC: 48 MG/DL
HDLC SERPL: 3 {RATIO} (ref 0–5)
HGB BLD-MCNC: 26 G/DL (ref 12.1–17)
IMM GRANULOCYTES # BLD AUTO: 0.1 K/UL (ref 0–0.04)
IMM GRANULOCYTES NFR BLD AUTO: 1 % (ref 0–0.5)
LDLC SERPL CALC-MCNC: 80.8 MG/DL (ref 0–100)
LIPID PROFILE,FLP: NORMAL
LYMPHOCYTES # BLD: 3.4 K/UL (ref 0.8–3.5)
LYMPHOCYTES NFR BLD: 28 % (ref 12–49)
MCH RBC QN AUTO: 31 PG (ref 26–34)
MCHC RBC AUTO-ENTMCNC: 34 G/DL (ref 30–36.5)
MCV RBC AUTO: 91.1 FL (ref 80–99)
MONOCYTES # BLD: 1.4 K/UL (ref 0–1)
MONOCYTES NFR BLD: 11 % (ref 5–13)
NEUTS SEG # BLD: 6.7 K/UL (ref 1.8–8)
NEUTS SEG NFR BLD: 54 % (ref 32–75)
NRBC # BLD: 0 K/UL (ref 0–0.01)
NRBC BLD-RTO: 0 PER 100 WBC
PLATELET # BLD AUTO: 453 K/UL (ref 150–400)
PMV BLD AUTO: 10.2 FL (ref 8.9–12.9)
POTASSIUM SERPL-SCNC: 4.8 MMOL/L (ref 3.5–5.1)
PROT SERPL-MCNC: 6.6 G/DL (ref 6.4–8.2)
RBC # BLD AUTO: 8.4 M/UL (ref 4.1–5.7)
RBC MORPH BLD: ABNORMAL
SODIUM SERPL-SCNC: 130 MMOL/L (ref 136–145)
TRIGL SERPL-MCNC: 81 MG/DL (ref ?–150)
TSH SERPL DL<=0.05 MIU/L-ACNC: 2.13 UIU/ML (ref 0.36–3.74)
VLDLC SERPL CALC-MCNC: 16.2 MG/DL
WBC # BLD AUTO: 12.3 K/UL (ref 4.1–11.1)

## 2020-07-13 DIAGNOSIS — R73.9 ELEVATED BLOOD SUGAR: Primary | ICD-10-CM

## 2020-07-14 ENCOUNTER — HOSPITAL ENCOUNTER (OUTPATIENT)
Dept: LAB | Age: 75
Discharge: HOME | End: 2020-07-14

## 2020-07-14 DIAGNOSIS — R73.9 ELEVATED BLOOD SUGAR: ICD-10-CM

## 2020-07-14 LAB
EST. AVERAGE GLUCOSE BLD GHB EST-MCNC: 108 MG/DL
HBA1C MFR BLD: 5.4 % (ref 4–5.6)

## 2020-09-04 ENCOUNTER — OFFICE VISIT (OUTPATIENT)
Dept: SURGERY | Age: 75
End: 2020-09-04
Payer: MEDICARE

## 2020-09-04 VITALS
DIASTOLIC BLOOD PRESSURE: 60 MMHG | TEMPERATURE: 98.2 F | RESPIRATION RATE: 18 BRPM | HEART RATE: 98 BPM | HEIGHT: 64 IN | SYSTOLIC BLOOD PRESSURE: 134 MMHG | BODY MASS INDEX: 30.39 KG/M2 | WEIGHT: 178 LBS | OXYGEN SATURATION: 99 %

## 2020-09-04 DIAGNOSIS — R10.32 BILATERAL GROIN PAIN: Primary | ICD-10-CM

## 2020-09-04 DIAGNOSIS — R10.31 BILATERAL GROIN PAIN: Primary | ICD-10-CM

## 2020-09-04 PROBLEM — E66.01 MORBID OBESITY (HCC): Status: RESOLVED | Noted: 2018-04-25 | Resolved: 2020-09-04

## 2020-09-04 PROBLEM — Z85.46 HISTORY OF PROSTATE CANCER: Status: RESOLVED | Noted: 2020-03-25 | Resolved: 2020-09-04

## 2020-09-04 PROCEDURE — G8754 DIAS BP LESS 90: HCPCS | Performed by: SURGERY

## 2020-09-04 PROCEDURE — 99213 OFFICE O/P EST LOW 20 MIN: CPT | Performed by: SURGERY

## 2020-09-04 PROCEDURE — G8752 SYS BP LESS 140: HCPCS | Performed by: SURGERY

## 2020-09-04 NOTE — PROGRESS NOTES
Surgery History and Physical    Subjective:      Sotero Maharaj is a 66 y.o. white male who presents for evaluation of bilateral groin pain. Mr. Judson Donald initially did fine after his hernia repair last year. A few months ago, he started to experience mild, intermittent pain in both groin. The pain is exacerbated by activity. He has not noticed a bulge on either side. He is eating fine and moving his bowels and urinating normally. Past Medical History:   Diagnosis Date    Cancer [facility])     Prostate    Diabetes ([address]     \"pre\" diabetic    Hypercholesterolemia     Hypertension     Ill-defined condition     hydrocele    Prostate CA ([address] 5/4/2011    SIADH (syndrome of inappropriate ADH production) ([facility] [address]     Staphylococcus infection in conditions classified elsewhere and of unspecified site     patient denies 12/29/2017     Past Surgical History:   Procedure Laterality Date    COLONOSCOPY  5/2006    HX HERNIA REPAIR      Umbilical.    HX HERNIA REPAIR Bilateral 01/04/2018    Bilateral inguinal herniorrhaphies with mesh.  HX HERNIA REPAIR Left 07/25/2019    Repair of recurrent left inguinal hernia with mesh.  HX PROSTATECTOMY      HX UROLOGICAL      Had scar Tissue remove from bladder      Family History   Problem Relation Age of Onset    Heart Disease Mother     Heart Disease Father     Heart Attack Father      Social History     Tobacco Use    Smoking status: Never Smoker    Smokeless tobacco: Never Used   Substance Use Topics    Alcohol use: No      Prior to Admission medications    Medication Sig Start Date End Date Taking? Authorizing Provider   ascorbate calcium (TAMIKO-C PO) Take 1,000 mg by mouth daily. Yes Provider, Historical   green tea leaf extract (GREEN TEA PO) Take 2 Caps by mouth daily. Yes Provider, Historical   glucosamine/chondroitn/Na/C/Se (JOINT FORMULA PO) Take 1 Cap by mouth two (2) times a day. Yes Provider, Historical   OTHER Take 1 Cap by mouth daily.  BLACK CUMIN SEED OIL   Yes Provider, Historical   QUERCETIN DIHYDRATE, BULK, Take 250 mg by mouth daily. Yes Provider, Historical   calcium carb/magnesium oxid/D3 (CALCIUM MAGNESIUM + D PO) Take 2 Caps by mouth two (2) times a day. Yes Provider, Historical   clotrimazole-betamethasone (LOTRISONE) topical cream Apply  to affected area two (2) times a day. 1/7/20  Yes Heidy Delgado MD   olmesartan (BENICAR) 40 mg tablet TAKE 1 TABLET BY MOUTH EVERY DAY FOR HIGH BLOOD PRESSURE 11/13/19  Yes Heidy Delgado MD   simvastatin (ZOCOR) 20 mg tablet Take 1 Tab by mouth nightly. 11/13/19  Yes Heidy Delgado MD   resveratrol 100 mg cap Take 1 Cap by mouth daily. Yes Provider, Historical   multivitamin (ONE A DAY) tablet Take 1 Tab by mouth daily. Yes Provider, Historical   inulin/chromium picolinate (FIBER SELECT GUMMIES PO) Take 2 Each by mouth daily. Yes Provider, Historical   VITAMIN K2 PO Take 1 Cap by mouth two (2) times a day. Yes Provider, Historical   Lycopene 15 mg cap Take 1 Cap by mouth daily. Yes Provider, Historical   POMEGRANATE XT/POMEGRANAT SEED (POMEGRANATE PO) Take 2 Caps by mouth daily. Yes Provider, Historical   Omega-3-DHA-EPA-Fish Oil 1,000 (120-180) mg cap Take 2 Caps by mouth daily. Yes Provider, Historical   calcium citrate-vitamin d3 (CITRACAL+D) 315-200 mg-unit tab Take 1 Tab by mouth daily (with breakfast). Yes Provider, Historical   BORON PO Take 2 Caps by mouth daily. Yes Provider, Historical   COQ10, UBIQUINOL, PO Take 1 Cap by mouth two (2) times a day. Yes Provider, Historical   SOY ISOFLAVONE PO Take 1 Cap by mouth daily. Yes Provider, Historical   cholecalciferol, vitamin D3, (VITAMIN D3) 2,000 unit tab Take 10,000 Units by mouth daily. Yes Provider, Historical   aspirin 81 mg chewable tablet Take 81 mg by mouth daily.    Yes Provider, Historical      Allergies   Allergen Reactions    Niacin Rash     \"flushing\"    Codeine Rash and Drowsiness       Review of Systems:  Pertinent items are noted in the History of Present Illness. Objective:      Physical Exam:  GENERAL: alert, cooperative, no distress, EYE: negative findings: anicteric sclera, LUNG: clear to auscultation bilaterally, HEART: regular rate and rhythm, ABDOMEN: Soft, NT, ND., GROIN: On the right, there is no hernia or tenderness. On the left, there is no hernia or tenderness. , SKIN: Normal., NEUROLOGIC: negative, PSYCHIATRIC: anxious    Assessment:     Bilateral groin pain. Plan:     Mr. Ritesh Ortega has no evidence of a hernia on exam today. His pain may be related to a muscle strain since it occurs after activity. Since he has had pelvic radiation for his prostate cancer and has already had 2 repairs, I would not recommend any further repairs unless absolutely necessary. If his symptoms become more severe or persistent, then I would get a CT of his pelvis. For now, he should be able to resume normal activity as desired. He can f/u with me prn.

## 2020-09-04 NOTE — PROGRESS NOTES
Patient c/o left-sided groin pain. 1. Have you been to the ER, urgent care clinic since your last visit? Hospitalized since your last visit? No    2. Have you seen or consulted any other health care providers outside of the [address] [place] [address] since your last visit? Include any pap smears or colon screening.  No

## 2020-12-28 DIAGNOSIS — I10 ESSENTIAL HYPERTENSION, BENIGN: ICD-10-CM

## 2020-12-28 RX ORDER — OLMESARTAN MEDOXOMIL 40 MG/1
TABLET ORAL
Qty: 90 TAB | Refills: 3 | Status: SHIPPED | OUTPATIENT
Start: 2020-12-28 | End: 2021-03-30 | Stop reason: SDUPTHER

## 2021-01-18 DIAGNOSIS — E78.2 MIXED HYPERLIPIDEMIA: ICD-10-CM

## 2021-01-18 RX ORDER — SIMVASTATIN 20 MG/1
20 TABLET, FILM COATED ORAL
Qty: 90 TAB | Refills: 3 | Status: SHIPPED | OUTPATIENT
Start: 2021-01-18 | End: 2021-03-30 | Stop reason: SDUPTHER

## 2021-03-30 ENCOUNTER — HOSPITAL ENCOUNTER (OUTPATIENT)
Dept: LAB | Age: 76
Discharge: HOME | End: 2021-03-30
Payer: MEDICARE

## 2021-03-30 ENCOUNTER — OFFICE VISIT (OUTPATIENT)
Dept: FAMILY MEDICINE CLINIC | Age: 76
End: 2021-03-30
Payer: MEDICARE

## 2021-03-30 VITALS
HEIGHT: 64 IN | OXYGEN SATURATION: 97 % | TEMPERATURE: 97.3 F | HEART RATE: 74 BPM | WEIGHT: 179.31 LBS | SYSTOLIC BLOOD PRESSURE: 124 MMHG | DIASTOLIC BLOOD PRESSURE: 75 MMHG | RESPIRATION RATE: 20 BRPM | BODY MASS INDEX: 30.61 KG/M2

## 2021-03-30 DIAGNOSIS — R73.03 PRE-DIABETES: ICD-10-CM

## 2021-03-30 DIAGNOSIS — E55.9 VITAMIN D DEFICIENCY: ICD-10-CM

## 2021-03-30 DIAGNOSIS — Z00.00 ROUTINE GENERAL MEDICAL EXAMINATION AT A HEALTH CARE FACILITY: Primary | ICD-10-CM

## 2021-03-30 DIAGNOSIS — E78.2 MIXED HYPERLIPIDEMIA: ICD-10-CM

## 2021-03-30 DIAGNOSIS — E87.1 HYPONATREMIA: ICD-10-CM

## 2021-03-30 DIAGNOSIS — C61 PROSTATE CANCER (HCC): ICD-10-CM

## 2021-03-30 DIAGNOSIS — I10 ESSENTIAL HYPERTENSION, BENIGN: ICD-10-CM

## 2021-03-30 DIAGNOSIS — N48.1 BALANITIS: ICD-10-CM

## 2021-03-30 PROCEDURE — 80061 LIPID PANEL: CPT

## 2021-03-30 PROCEDURE — 84443 ASSAY THYROID STIM HORMONE: CPT

## 2021-03-30 PROCEDURE — G8536 NO DOC ELDER MAL SCRN: HCPCS | Performed by: FAMILY MEDICINE

## 2021-03-30 PROCEDURE — G8417 CALC BMI ABV UP PARAM F/U: HCPCS | Performed by: FAMILY MEDICINE

## 2021-03-30 PROCEDURE — G8752 SYS BP LESS 140: HCPCS | Performed by: FAMILY MEDICINE

## 2021-03-30 PROCEDURE — 86141 C-REACTIVE PROTEIN HS: CPT

## 2021-03-30 PROCEDURE — 85025 COMPLETE CBC W/AUTO DIFF WBC: CPT

## 2021-03-30 PROCEDURE — G8427 DOCREV CUR MEDS BY ELIG CLIN: HCPCS | Performed by: FAMILY MEDICINE

## 2021-03-30 PROCEDURE — 99214 OFFICE O/P EST MOD 30 MIN: CPT | Performed by: FAMILY MEDICINE

## 2021-03-30 PROCEDURE — G0463 HOSPITAL OUTPT CLINIC VISIT: HCPCS | Performed by: FAMILY MEDICINE

## 2021-03-30 PROCEDURE — G0439 PPPS, SUBSEQ VISIT: HCPCS | Performed by: FAMILY MEDICINE

## 2021-03-30 PROCEDURE — G8510 SCR DEP NEG, NO PLAN REQD: HCPCS | Performed by: FAMILY MEDICINE

## 2021-03-30 PROCEDURE — 1101F PT FALLS ASSESS-DOCD LE1/YR: CPT | Performed by: FAMILY MEDICINE

## 2021-03-30 PROCEDURE — 83090 ASSAY OF HOMOCYSTEINE: CPT

## 2021-03-30 PROCEDURE — G8754 DIAS BP LESS 90: HCPCS | Performed by: FAMILY MEDICINE

## 2021-03-30 PROCEDURE — 83036 HEMOGLOBIN GLYCOSYLATED A1C: CPT

## 2021-03-30 PROCEDURE — 82306 VITAMIN D 25 HYDROXY: CPT

## 2021-03-30 PROCEDURE — 80053 COMPREHEN METABOLIC PANEL: CPT

## 2021-03-30 RX ORDER — CLOTRIMAZOLE AND BETAMETHASONE DIPROPIONATE 10; .64 MG/G; MG/G
CREAM TOPICAL 2 TIMES DAILY
Qty: 30 G | Refills: 5 | Status: SHIPPED | OUTPATIENT
Start: 2021-03-30 | End: 2021-07-13 | Stop reason: ALTCHOICE

## 2021-03-30 RX ORDER — OLMESARTAN MEDOXOMIL 40 MG/1
TABLET ORAL
Qty: 90 TAB | Refills: 3 | Status: SHIPPED | OUTPATIENT
Start: 2021-03-30 | End: 2022-02-14 | Stop reason: SDUPTHER

## 2021-03-30 RX ORDER — SIMVASTATIN 20 MG/1
20 TABLET, FILM COATED ORAL
Qty: 90 TAB | Refills: 3 | Status: SHIPPED | OUTPATIENT
Start: 2021-03-30 | End: 2022-02-14 | Stop reason: SDUPTHER

## 2021-03-30 NOTE — PROGRESS NOTES
1. Have you been to the ER, urgent care clinic since your last visit? Hospitalized since your last visit? No    2. Have you seen or consulted any other health care providers outside of the [address] [place] [address] since your last visit? Include any pap smears or colon screening. No   Chief Complaint   Patient presents with    Complete Physical     CPE-    Labs     labs- pt is fasting     Complete Physical Exam  Pre-Visit Questions:    1. Do you follow a low fat diet? yes  2. Are you up to date on your Tdap (<10 years)? yes  3. Have you ever had a Pneumovax vaccine?  no  4. Do you follow an exercise program?  no  5. Do you smoke?  no  6. Do you consider yourself overweight?  yes  7. Do you Testicular self exam?  no  8. Is there a family history of CAD< age 48?  no  5. Is there a family history of Cancer?  no  10. Do you know your Cancer risks? yes  11. Have you had a colonoscopy? yes        Medicare Wellness Exam:    Chief Complaint   Patient presents with    Complete Physical     CPE-    Labs     labs- pt is fasting     he is a 64y.o. year old male who presents for evaluation for their Medicare Wellness Visit. Gearold Meiers is completed and assessed=yes  Depression Screen is completed and assessed=yes  Medication list reviewed and adjusted for accuracy=yes  Immunizations reviewed and updated=yes  Health/Preventative Screenings reviewed and updated=yes  ADL Functions reviewed=yes    Patient Active Problem List    Diagnosis    Prostate cancer ([facility] [address]    S/P left inguinal herniorrhaphy     With mesh.  Bilateral groin pain    Elevated blood sugar    S/P bilateral inguinal hernia repair     With mesh.  Obesity (BMI 30.0-34. 9)    Advanced care planning/counseling discussion     Has LW      Osteoporosis    Pre-diabetes    Hypogonadism male    Vitamin D deficiency    Hyponatremia    OCD (obsessive compulsive disorder)    Anxiety state, unspecified    Mixed hyperlipidemia    Essential hypertension, benign       Reviewed PmHx, RxHx, FmHx, SocHx, AllgHx and updated and dated in the chart. Review of Systems - negative except as listed above in the HPI    Objective:     Vitals:    03/30/21 0828   BP: 124/75   Pulse: 74   Resp: 20   Temp: 97.3 °F (36.3 °C)   TempSrc: Temporal   SpO2: 97%   Weight: 179 lb 5 oz (81.3 kg)   Height: 5' 4\" (1.626 m)     Physical Examination: General appearance - alert, well appearing, and in no distress  Chest - clear to auscultation, no wheezes, rales or rhonchi, symmetric air entry  Heart - normal rate, regular rhythm, normal S1, S2, no murmurs, rubs, clicks or gallops       Assessment/ Plan:   Diagnoses and all orders for this visit:    1. Routine general medical examination at a health care facility  -     LIPID PANEL; Future  -     METABOLIC PANEL, COMPREHENSIVE; Future  -     CBC WITH AUTOMATED DIFF; Future  -     TSH 3RD GENERATION; Future  -     HEMOGLOBIN A1C WITH EAG; Future  -     VITAMIN D, 25 HYDROXY; Future  -     CRP, HIGH SENSITIVITY; Future  -     HOMOCYSTEINE, PLASMA; Future    2. Essential hypertension, benign  -     LIPID PANEL; Future  -     METABOLIC PANEL, COMPREHENSIVE; Future  -     olmesartan (BENICAR) 40 mg tablet; TAKE 1 TABLET BY MOUTH EVERY DAY FOR HIGH BLOOD PRESSURE  -at goal    3. Pre-diabetes  -     METABOLIC PANEL, COMPREHENSIVE; Future  -     HEMOGLOBIN A1C WITH EAG; Future  Lab Results   Component Value Date/Time    Hemoglobin A1c 5.4 07/14/2020 11:47 AM     4. Mixed hyperlipidemia  -     LIPID PANEL; Future  -     METABOLIC PANEL, COMPREHENSIVE; Future  -     simvastatin (ZOCOR) 20 mg tablet; Take 1 Tab by mouth nightly. 5. Vitamin D deficiency  -     VITAMIN D, 25 HYDROXY; Future    6. Prostate cancer [facility])  -seeing  Urology and good report    7. Hyponatremia  -     METABOLIC PANEL, COMPREHENSIVE; Future    8.  Balanitis  -     clotrimazole-betamethasone (LOTRISONE) topical cream; Apply  to affected area two (2) times a day.         -Pain evaluation performed in office  -Cognitive Screen performed in office  -Depression Screen, Fall risks (by up and go test)  and ADL functionality were addressed  -Medication list updated and reviewed for any changes   -A comprehensive review of medical issues and a plan was formulated  -End of life planning was addressed with pt   -Health Screenings for preventions were addressed and a plan was formulated  -Shingles Vaccine was recommended  -Discussed with patient cancer risk factors and appropriate screenings for age  -Patient evaluated for colonoscopy and referred if needed per screeing criteria  -Labs from previous visits were discussed with patient   -Discussed with patient diet and exercise and formulated a plan as needed  -An Advanced care plan was developed with the patient.  -Alcohol screening performed and was negative    -    I have discussed the diagnosis with the patient and the intended plan as seen in the above orders. The patient understands and agrees with the plan. The patient has received an after-visit summary and questions were answered concerning future plans. Medication Side Effects and Warnings were discussed with patien  Patient Labs were reviewed and or requested  Patient Past Records were reviewed and or requested    There are no Patient Instructions on file for this visit.       Poly Vinson M.D.

## 2021-04-01 LAB
25(OH)D3+25(OH)D2 SERPL-MCNC: 101 NG/ML (ref 30–100)
ALBUMIN SERPL-MCNC: 4.6 G/DL (ref 3.7–4.7)
ALBUMIN/GLOB SERPL: 2.7 {RATIO} (ref 1.2–2.2)
ALP SERPL-CCNC: 42 IU/L (ref 39–117)
ALT SERPL-CCNC: 19 IU/L (ref 0–44)
AST SERPL-CCNC: 28 IU/L (ref 0–40)
BASOPHILS # BLD AUTO: 0.1 X10E3/UL (ref 0–0.2)
BASOPHILS NFR BLD AUTO: 1 %
BILIRUB SERPL-MCNC: 0.6 MG/DL (ref 0–1.2)
BUN SERPL-MCNC: 20 MG/DL (ref 8–27)
BUN/CREAT SERPL: 21 (ref 10–24)
CALCIUM SERPL-MCNC: 9.5 MG/DL (ref 8.6–10.2)
CHLORIDE SERPL-SCNC: 96 MMOL/L (ref 96–106)
CHOLEST SERPL-MCNC: 147 MG/DL (ref 100–199)
CO2 SERPL-SCNC: 23 MMOL/L (ref 20–29)
CREAT SERPL-MCNC: 0.95 MG/DL (ref 0.76–1.27)
CRP SERPL HS-MCNC: 0.38 MG/L (ref 0–3)
EOSINOPHIL # BLD AUTO: 0.2 X10E3/UL (ref 0–0.4)
EOSINOPHIL NFR BLD AUTO: 3 %
ERYTHROCYTE [DISTWIDTH] IN BLOOD BY AUTOMATED COUNT: 13 % (ref 11.6–15.4)
EST. AVERAGE GLUCOSE BLD GHB EST-MCNC: 111 MG/DL
GLOBULIN SER CALC-MCNC: 1.7 G/DL (ref 1.5–4.5)
GLUCOSE SERPL-MCNC: 98 MG/DL (ref 65–99)
HBA1C MFR BLD: 5.5 % (ref 4.8–5.6)
HCT VFR BLD AUTO: 42.3 % (ref 37.5–51)
HCYS SERPL-SCNC: 11.5 UMOL/L (ref 0–19.2)
HDLC SERPL-MCNC: 52 MG/DL
HGB BLD-MCNC: 14.5 G/DL (ref 13–17.7)
IMM GRANULOCYTES # BLD AUTO: 0 X10E3/UL (ref 0–0.1)
IMM GRANULOCYTES NFR BLD AUTO: 1 %
IMP & REVIEW OF LAB RESULTS: NORMAL
LDLC SERPL CALC-MCNC: 77 MG/DL (ref 0–99)
LYMPHOCYTES # BLD AUTO: 1 X10E3/UL (ref 0.7–3.1)
LYMPHOCYTES NFR BLD AUTO: 17 %
MCH RBC QN AUTO: 31.3 PG (ref 26.6–33)
MCHC RBC AUTO-ENTMCNC: 34.3 G/DL (ref 31.5–35.7)
MCV RBC AUTO: 91 FL (ref 79–97)
MONOCYTES # BLD AUTO: 0.7 X10E3/UL (ref 0.1–0.9)
MONOCYTES NFR BLD AUTO: 11 %
NEUTROPHILS # BLD AUTO: 4.1 X10E3/UL (ref 1.4–7)
NEUTROPHILS NFR BLD AUTO: 67 %
PLATELET # BLD AUTO: 232 X10E3/UL (ref 150–450)
POTASSIUM SERPL-SCNC: 5.3 MMOL/L (ref 3.5–5.2)
PROT SERPL-MCNC: 6.3 G/DL (ref 6–8.5)
RBC # BLD AUTO: 4.64 X10E6/UL (ref 4.14–5.8)
SODIUM SERPL-SCNC: 133 MMOL/L (ref 134–144)
TRIGL SERPL-MCNC: 100 MG/DL (ref 0–149)
TSH SERPL DL<=0.005 MIU/L-ACNC: 3.75 UIU/ML (ref 0.45–4.5)
VLDLC SERPL CALC-MCNC: 18 MG/DL (ref 5–40)
WBC # BLD AUTO: 6 X10E3/UL (ref 3.4–10.8)

## 2021-06-08 ENCOUNTER — OFFICE VISIT (OUTPATIENT)
Dept: FAMILY MEDICINE CLINIC | Age: 76
End: 2021-06-08
Payer: MEDICARE

## 2021-06-08 VITALS
RESPIRATION RATE: 14 BRPM | OXYGEN SATURATION: 97 % | SYSTOLIC BLOOD PRESSURE: 111 MMHG | HEART RATE: 89 BPM | BODY MASS INDEX: 30.9 KG/M2 | TEMPERATURE: 98.9 F | DIASTOLIC BLOOD PRESSURE: 60 MMHG | WEIGHT: 181 LBS | HEIGHT: 64 IN

## 2021-06-08 DIAGNOSIS — I10 ESSENTIAL HYPERTENSION, BENIGN: ICD-10-CM

## 2021-06-08 DIAGNOSIS — T14.8XXA BRUISING: Primary | ICD-10-CM

## 2021-06-08 DIAGNOSIS — S39.011S STRAIN OF ABDOMINAL MUSCLE, SEQUELA: ICD-10-CM

## 2021-06-08 PROCEDURE — G8427 DOCREV CUR MEDS BY ELIG CLIN: HCPCS | Performed by: FAMILY MEDICINE

## 2021-06-08 PROCEDURE — G8417 CALC BMI ABV UP PARAM F/U: HCPCS | Performed by: FAMILY MEDICINE

## 2021-06-08 PROCEDURE — G8754 DIAS BP LESS 90: HCPCS | Performed by: FAMILY MEDICINE

## 2021-06-08 PROCEDURE — G0463 HOSPITAL OUTPT CLINIC VISIT: HCPCS | Performed by: FAMILY MEDICINE

## 2021-06-08 PROCEDURE — G8510 SCR DEP NEG, NO PLAN REQD: HCPCS | Performed by: FAMILY MEDICINE

## 2021-06-08 PROCEDURE — G8752 SYS BP LESS 140: HCPCS | Performed by: FAMILY MEDICINE

## 2021-06-08 PROCEDURE — G8536 NO DOC ELDER MAL SCRN: HCPCS | Performed by: FAMILY MEDICINE

## 2021-06-08 PROCEDURE — 1101F PT FALLS ASSESS-DOCD LE1/YR: CPT | Performed by: FAMILY MEDICINE

## 2021-06-08 PROCEDURE — 99214 OFFICE O/P EST MOD 30 MIN: CPT | Performed by: FAMILY MEDICINE

## 2021-06-08 NOTE — PROGRESS NOTES
Chief Complaint   Patient presents with    Bleeding/Bruising     still taking ASA 81 mg    Hypertension    Abdominal Pain     when lifting    Results     Labs done 3/30/21     1. Have you been to the ER, urgent care clinic since your last visit? Hospitalized since your last visit? No    2. Have you seen or consulted any other health care providers outside of the [address] [place] [address] since your last visit? Include any pap smears or colon screening. No         Chief Complaint   Patient presents with    Bleeding/Bruising     still taking ASA 81 mg    Hypertension    Abdominal Pain     when lifting    Results     Labs done 3/30/21     He is a 64 y.o. male who presents for evalution. Reviewed PmHx, RxHx, FmHx, SocHx, AllgHx and updated and dated in the chart. Patient Active Problem List    Diagnosis    Prostate cancer ([facility] [address]    S/P left inguinal herniorrhaphy     With mesh.  Bilateral groin pain    Elevated blood sugar    S/P bilateral inguinal hernia repair     With mesh.  Obesity (BMI 30.0-34. 9)    Advanced care planning/counseling discussion     Has LW      Osteoporosis    Pre-diabetes    Hypogonadism male    Vitamin D deficiency    Hyponatremia    OCD (obsessive compulsive disorder)    Anxiety state, unspecified    Mixed hyperlipidemia    Essential hypertension, benign       Review of Systems - negative except as listed above in the HPI    Objective:     Vitals:    06/08/21 1323   BP: 111/60   Pulse: 89   Resp: 14   Temp: 98.9 °F (37.2 °C)   TempSrc: Oral   SpO2: 97%   Weight: 181 lb (82.1 kg)   Height: 5' 4\" (1.626 m)         Assessment/ Plan:   Diagnoses and all orders for this visit:    1. Bruising  Patient is complaining of occasional bruising on her arms is concerned about taking aspirin daily. Patient's platelet counts are normal recently no evidence of bruising on examination today. Recommend to continue with low-dose aspirin.     2. Essential hypertension, benign  Blood pressure goal  3. Strain of abdominal muscle, sequela  Patient abdominal strain in the right side better with anti-inflammatory. Negative exam and negative for hernia continue with current plan. I have discussed the diagnosis with the patient and the intended plan as seen in the above orders. The patient understands and agrees with the plan. The patient has received an after-visit summary and questions were answered concerning future plans. Medication Side Effects and Warnings were discussed with patient  Patient Labs were reviewed and or requested:  Patient Past Records were reviewed and or requested    Jose Alberto Pate M.D. There are no Patient Instructions on file for this visit.

## 2021-07-01 ENCOUNTER — OFFICE VISIT (OUTPATIENT)
Dept: FAMILY MEDICINE CLINIC | Age: 76
End: 2021-07-01
Payer: MEDICARE

## 2021-07-01 VITALS
SYSTOLIC BLOOD PRESSURE: 146 MMHG | RESPIRATION RATE: 19 BRPM | OXYGEN SATURATION: 96 % | TEMPERATURE: 98.1 F | HEART RATE: 91 BPM | DIASTOLIC BLOOD PRESSURE: 68 MMHG | BODY MASS INDEX: 31.41 KG/M2 | HEIGHT: 64 IN | WEIGHT: 184 LBS

## 2021-07-01 DIAGNOSIS — R35.0 URINARY FREQUENCY: Primary | ICD-10-CM

## 2021-07-01 LAB
BILIRUB UR QL STRIP: NEGATIVE
GLUCOSE UR-MCNC: NEGATIVE MG/DL
KETONES P FAST UR STRIP-MCNC: NEGATIVE MG/DL
PH UR STRIP: 7 [PH] (ref 4.6–8)
PROT UR QL STRIP: POSITIVE
SP GR UR STRIP: 1.02 (ref 1–1.03)
UA UROBILINOGEN AMB POC: NORMAL (ref 0.2–1)
URINALYSIS CLARITY POC: NORMAL
URINALYSIS COLOR POC: YELLOW
URINE BLOOD POC: NORMAL
URINE LEUKOCYTES POC: NORMAL
URINE NITRITES POC: POSITIVE

## 2021-07-01 PROCEDURE — G8417 CALC BMI ABV UP PARAM F/U: HCPCS | Performed by: NURSE PRACTITIONER

## 2021-07-01 PROCEDURE — 99213 OFFICE O/P EST LOW 20 MIN: CPT | Performed by: NURSE PRACTITIONER

## 2021-07-01 PROCEDURE — 1101F PT FALLS ASSESS-DOCD LE1/YR: CPT | Performed by: NURSE PRACTITIONER

## 2021-07-01 PROCEDURE — G0463 HOSPITAL OUTPT CLINIC VISIT: HCPCS | Performed by: NURSE PRACTITIONER

## 2021-07-01 PROCEDURE — 81003 URINALYSIS AUTO W/O SCOPE: CPT | Performed by: NURSE PRACTITIONER

## 2021-07-01 PROCEDURE — G8753 SYS BP > OR = 140: HCPCS | Performed by: NURSE PRACTITIONER

## 2021-07-01 PROCEDURE — G8754 DIAS BP LESS 90: HCPCS | Performed by: NURSE PRACTITIONER

## 2021-07-01 PROCEDURE — G8432 DEP SCR NOT DOC, RNG: HCPCS | Performed by: NURSE PRACTITIONER

## 2021-07-01 PROCEDURE — G8536 NO DOC ELDER MAL SCRN: HCPCS | Performed by: NURSE PRACTITIONER

## 2021-07-01 PROCEDURE — G8427 DOCREV CUR MEDS BY ELIG CLIN: HCPCS | Performed by: NURSE PRACTITIONER

## 2021-07-01 RX ORDER — CHLORPHENIRAMINE MALEATE 4 MG
TABLET ORAL
COMMUNITY
Start: 2021-06-08 | End: 2021-07-13 | Stop reason: ALTCHOICE

## 2021-07-01 RX ORDER — CIPROFLOXACIN 500 MG/1
500 TABLET ORAL 2 TIMES DAILY
Qty: 14 TABLET | Refills: 0 | Status: SHIPPED | OUTPATIENT
Start: 2021-07-01 | End: 2021-07-08

## 2021-07-01 NOTE — PROGRESS NOTES
Wilda Kent is a 64 y.o. male who complains of dysuria, frequency, urgency for 3 days. Patient denies flank pain, vomiting, fever. Patient does not have a history of recurrent UTI. Patient does not have a history of pyelonephritis. Review of Systems  Pertinent items are noted in HPI. Objective:     Visit Vitals  BP (!) 146/68 (BP 1 Location: Left arm, BP Patient Position: Sitting, BP Cuff Size: Adult)   Pulse 91   Temp 98.1 °F (36.7 °C) (Oral)   Resp 19   Ht 5' 4\" (1.626 m)   Wt 184 lb (83.5 kg)   SpO2 96%   BMI 31.58 kg/m²     General:  alert, cooperative, no distress   Abdomen: soft, nontender, nondistended, no masses or organomegaly. Back:  CVA tenderness absent   :  defer exam     Laboratory:   Urine dipstick shows positive for nitrates and positive for leukocytes. Micro exam: not done. Assessment/Plan:     Acute cystitis     1. ciprofloxacin  2. Maintain adequate hydration  3. May use OTC pyridium as desired, which will turn urine orange/red color  4. Follow up if symptoms not improving, and prn. Encounter Diagnoses   Name Primary?  Urinary frequency Yes     Orders Placed This Encounter    CULTURE, URINE    AMB POC URINALYSIS DIP STICK AUTO W/O MICRO    clotrimazole (LOTRIMIN) 1 % topical cream    ciprofloxacin HCl (CIPRO) 500 mg tablet   . Given cipro but will also send culture  Reviewed triggers and adr/se of med    I have discussed the diagnosis with the patient and the intended plan as seen in the above orders. The patient has received an after-visit summary and questions were answered concerning future plans. Patient conveyed understanding of the plan at the time of the visit.     Hetal Chapman, MSN, ANP  7/1/2021

## 2021-07-01 NOTE — PROGRESS NOTES
Chief Complaint   Patient presents with    UTI     Patient presents in office for a possible UTI. Pt recently visited his Urologist, had jock itch. Pt states the cream provided relief. 1. Have you been to the ER, urgent care clinic since your last visit? Hospitalized since your last visit? No    2. Have you seen or consulted any other health care providers outside of the [address] [place] [address] since your last visit? Include any pap smears or colon screening.  No

## 2021-07-09 ENCOUNTER — TELEPHONE (OUTPATIENT)
Dept: FAMILY MEDICINE CLINIC | Age: 76
End: 2021-07-09

## 2021-07-09 NOTE — TELEPHONE ENCOUNTER
Patient calling back to see if we have received results from his urine culture.  Patient's phone: [phone]

## 2021-07-12 NOTE — TELEPHONE ENCOUNTER
Patient is checking on urine culture from 07/01/21. He states he needs these result asap & then make fuv. No openings. Can you check on results & he is waiting for call?

## 2021-07-12 NOTE — TELEPHONE ENCOUNTER
Spoke with pt, he states that he is doing a little better.  Will have pt come in tomorrow for repeat urine as culture was not received at lab

## 2021-07-12 NOTE — TELEPHONE ENCOUNTER
He was given cipro last visit. How are his symptoms. The sample may be lost at this point. We may have to resend if he is still having sx. If no sx then assume the infection is gone and no other sample is needed.  Mariana Zuniga

## 2021-07-13 ENCOUNTER — OFFICE VISIT (OUTPATIENT)
Dept: FAMILY MEDICINE CLINIC | Age: 76
End: 2021-07-13
Payer: MEDICARE

## 2021-07-13 ENCOUNTER — TELEPHONE (OUTPATIENT)
Dept: FAMILY MEDICINE CLINIC | Age: 76
End: 2021-07-13

## 2021-07-13 VITALS
HEIGHT: 64 IN | OXYGEN SATURATION: 96 % | RESPIRATION RATE: 19 BRPM | SYSTOLIC BLOOD PRESSURE: 115 MMHG | WEIGHT: 183 LBS | DIASTOLIC BLOOD PRESSURE: 70 MMHG | HEART RATE: 81 BPM | TEMPERATURE: 97.7 F | BODY MASS INDEX: 31.24 KG/M2

## 2021-07-13 DIAGNOSIS — R35.0 URINARY FREQUENCY: Primary | ICD-10-CM

## 2021-07-13 LAB
BILIRUB UR QL STRIP: NEGATIVE
GLUCOSE UR-MCNC: NEGATIVE MG/DL
KETONES P FAST UR STRIP-MCNC: NEGATIVE MG/DL
PH UR STRIP: 6.5 [PH] (ref 4.6–8)
PROT UR QL STRIP: NEGATIVE
SP GR UR STRIP: 1.02 (ref 1–1.03)
UA UROBILINOGEN AMB POC: NORMAL (ref 0.2–1)
URINALYSIS CLARITY POC: CLEAR
URINALYSIS COLOR POC: YELLOW
URINE BLOOD POC: NORMAL
URINE LEUKOCYTES POC: NEGATIVE
URINE NITRITES POC: NEGATIVE

## 2021-07-13 PROCEDURE — G8417 CALC BMI ABV UP PARAM F/U: HCPCS | Performed by: NURSE PRACTITIONER

## 2021-07-13 PROCEDURE — G8754 DIAS BP LESS 90: HCPCS | Performed by: NURSE PRACTITIONER

## 2021-07-13 PROCEDURE — G8432 DEP SCR NOT DOC, RNG: HCPCS | Performed by: NURSE PRACTITIONER

## 2021-07-13 PROCEDURE — 99213 OFFICE O/P EST LOW 20 MIN: CPT | Performed by: NURSE PRACTITIONER

## 2021-07-13 PROCEDURE — 81003 URINALYSIS AUTO W/O SCOPE: CPT | Performed by: NURSE PRACTITIONER

## 2021-07-13 PROCEDURE — G8752 SYS BP LESS 140: HCPCS | Performed by: NURSE PRACTITIONER

## 2021-07-13 PROCEDURE — G0463 HOSPITAL OUTPT CLINIC VISIT: HCPCS | Performed by: NURSE PRACTITIONER

## 2021-07-13 PROCEDURE — G8427 DOCREV CUR MEDS BY ELIG CLIN: HCPCS | Performed by: NURSE PRACTITIONER

## 2021-07-13 PROCEDURE — 1101F PT FALLS ASSESS-DOCD LE1/YR: CPT | Performed by: NURSE PRACTITIONER

## 2021-07-13 PROCEDURE — G8536 NO DOC ELDER MAL SCRN: HCPCS | Performed by: NURSE PRACTITIONER

## 2021-07-13 RX ORDER — KETOCONAZOLE 20 MG/G
CREAM TOPICAL DAILY
COMMUNITY

## 2021-07-13 NOTE — PROGRESS NOTES
Chief Complaint   Patient presents with    UTI     re check     Patient presents in office for UTI follow up        1. Have you been to the ER, urgent care clinic since your last visit? Hospitalized since your last visit? No    2. Have you seen or consulted any other health care providers outside of the [address] [place] [address] since your last visit? Include any pap smears or colon screening.  No

## 2021-07-13 NOTE — TELEPHONE ENCOUNTER
----- Message from Natividad Devine sent at 7/13/2021  1:04 PM EDT -----  Regarding: Dr. Landry Pont: [phone]  General Message/Vendor Calls    Caller's first and last name: Pt      Reason for call: Pt had an appt today to f/up on a Urinalysis. He forgot to ask, but is wondering if he should be concerned by the results showing a trace of blood in his urine. Please advise. Callback required yes/no and why: Yes.       Best contact number(s):[phone]      Details to clarify the request: N/a      Natividad Devine

## 2021-07-20 NOTE — PROGRESS NOTES
HISTORY OF PRESENT ILLNESS  Idalmis Carter is a 64 y.o. male. HPI  Patient states his sx of uti are gone  Completed the cipro  Wants urine recheck to make sure it is gone    ROS  A comprehensive review of system was obtained and negative except findings in the HPI    Visit Vitals  /70 (BP 1 Location: Left arm, BP Patient Position: Sitting, BP Cuff Size: Adult)   Pulse 81   Temp 97.7 °F (36.5 °C) (Oral)   Resp 19   Ht 5' 4\" (1.626 m)   Wt 183 lb (83 kg)   SpO2 96%   BMI 31.41 kg/m²     Physical Exam  Vitals and nursing note reviewed. Constitutional:       Appearance: Normal appearance. Cardiovascular:      Rate and Rhythm: Normal rate and regular rhythm. Heart sounds: Normal heart sounds. Pulmonary:      Breath sounds: Normal breath sounds. Neurological:      Mental Status: He is alert. ASSESSMENT and PLAN  Encounter Diagnoses   Name Primary?  Urinary frequency Yes     Orders Placed This Encounter    AMB POC URINALYSIS DIP STICK AUTO W/O MICRO    ketoconazole (NIZORAL) 2 % topical cream     Urine dip is clean   No additional work up is needed    I have discussed the diagnosis with the patient and the intended plan as seen in the above orders. The patient has received an after-visit summary and questions were answered concerning future plans. Patient conveyed understanding of the plan at the time of the visit.     Promise Marx, MSN, ANP  7/19/2021

## 2022-02-14 ENCOUNTER — OFFICE VISIT (OUTPATIENT)
Dept: FAMILY MEDICINE CLINIC | Age: 77
End: 2022-02-14
Payer: MEDICARE

## 2022-02-14 VITALS
OXYGEN SATURATION: 97 % | TEMPERATURE: 98.1 F | HEART RATE: 79 BPM | DIASTOLIC BLOOD PRESSURE: 72 MMHG | RESPIRATION RATE: 18 BRPM | BODY MASS INDEX: 31.24 KG/M2 | WEIGHT: 183 LBS | SYSTOLIC BLOOD PRESSURE: 117 MMHG | HEIGHT: 64 IN

## 2022-02-14 DIAGNOSIS — I10 ESSENTIAL HYPERTENSION, BENIGN: ICD-10-CM

## 2022-02-14 DIAGNOSIS — R73.03 PRE-DIABETES: ICD-10-CM

## 2022-02-14 DIAGNOSIS — C61 PROSTATE CANCER (HCC): Primary | ICD-10-CM

## 2022-02-14 DIAGNOSIS — E55.9 VITAMIN D DEFICIENCY: ICD-10-CM

## 2022-02-14 DIAGNOSIS — E87.1 HYPONATREMIA: ICD-10-CM

## 2022-02-14 DIAGNOSIS — E78.2 MIXED HYPERLIPIDEMIA: ICD-10-CM

## 2022-02-14 PROCEDURE — 99214 OFFICE O/P EST MOD 30 MIN: CPT | Performed by: FAMILY MEDICINE

## 2022-02-14 PROCEDURE — 1101F PT FALLS ASSESS-DOCD LE1/YR: CPT | Performed by: FAMILY MEDICINE

## 2022-02-14 PROCEDURE — G8536 NO DOC ELDER MAL SCRN: HCPCS | Performed by: FAMILY MEDICINE

## 2022-02-14 PROCEDURE — G8417 CALC BMI ABV UP PARAM F/U: HCPCS | Performed by: FAMILY MEDICINE

## 2022-02-14 PROCEDURE — G8510 SCR DEP NEG, NO PLAN REQD: HCPCS | Performed by: FAMILY MEDICINE

## 2022-02-14 PROCEDURE — G8754 DIAS BP LESS 90: HCPCS | Performed by: FAMILY MEDICINE

## 2022-02-14 PROCEDURE — G8427 DOCREV CUR MEDS BY ELIG CLIN: HCPCS | Performed by: FAMILY MEDICINE

## 2022-02-14 PROCEDURE — G8752 SYS BP LESS 140: HCPCS | Performed by: FAMILY MEDICINE

## 2022-02-14 PROCEDURE — G0463 HOSPITAL OUTPT CLINIC VISIT: HCPCS | Performed by: FAMILY MEDICINE

## 2022-02-14 RX ORDER — SIMVASTATIN 20 MG/1
20 TABLET, FILM COATED ORAL
Qty: 90 TABLET | Refills: 3 | Status: SHIPPED | OUTPATIENT
Start: 2022-02-14

## 2022-02-14 RX ORDER — OLMESARTAN MEDOXOMIL 40 MG/1
TABLET ORAL
Qty: 90 TABLET | Refills: 3 | Status: SHIPPED | OUTPATIENT
Start: 2022-02-14

## 2022-02-14 NOTE — PROGRESS NOTES
Patient here for wellness visit, fasting labs and physical.     1. Have you been to the ER, urgent care clinic since your last visit? Hospitalized since your last visit? No    2. Have you seen or consulted any other health care providers outside of the [address] [place] [address] since your last visit? Include any pap smears or colon screening. No     Complete Physical Exam  Pre-Visit Questions:    1. Do you follow a low fat diet? yes  2. Are you up to date on your Tdap (<10 years)? yes  3. Have you ever had a Pneumovax vaccine?  yes  4. Do you follow an exercise program?  yes  5. Do you smoke?  no  6. Do you consider yourself overweight?  yes  7. Do you Testicular self exam?  no  8. Is there a family history of CAD< age 48?  yes  5. Is there a family history of Cancer?  yes  10. Do you know your Cancer risks?  no  11. Have you had a colonoscopy? yes               Chief Complaint   Patient presents with    Physical     fasting labs     He is a 64 y.o. male who presents for evalution. Reviewed PmHx, RxHx, FmHx, SocHx, AllgHx and updated and dated in the chart. Patient Active Problem List    Diagnosis    Prostate cancer ([facility] [address]    S/P left inguinal herniorrhaphy     With mesh.  Bilateral groin pain    Elevated blood sugar    S/P bilateral inguinal hernia repair     With mesh.  Obesity (BMI 30.0-34. 9)    Advanced care planning/counseling discussion     Has LW      Osteoporosis    Pre-diabetes    Hypogonadism male    Vitamin D deficiency    Hyponatremia    OCD (obsessive compulsive disorder)    Anxiety state, unspecified    Mixed hyperlipidemia    Essential hypertension, benign       Review of Systems - negative except as listed above in the HPI    Objective:     Vitals:    02/14/22 0910   BP: 117/72   Pulse: 79   Resp: 18   Temp: 98.1 °F (36.7 °C)   SpO2: 97%   Weight: 183 lb (83 kg)   Height: 5' 4\" (1.626 m)         Assessment/ Plan:   Diagnoses and all orders for this visit: 1. Prostate cancer [facility])  -seeing specialist and doing well    2. Essential hypertension, benign  -     olmesartan (BENICAR) 40 mg tablet; TAKE 1 TABLET BY MOUTH EVERY DAY FOR HIGH BLOOD PRESSURE  -     LIPID PANEL; Future  -     METABOLIC PANEL, COMPREHENSIVE; Future  -at goal    3. Mixed hyperlipidemia  -     simvastatin (ZOCOR) 20 mg tablet; Take 1 Tablet by mouth nightly. -     LIPID PANEL; Future  -     METABOLIC PANEL, COMPREHENSIVE; Future  -     TSH 3RD GENERATION; Future    4. Pre-diabetes  -     METABOLIC PANEL, COMPREHENSIVE; Future  -     HEMOGLOBIN A1C WITH EAG; Future    5. Hyponatremia  -     METABOLIC PANEL, COMPREHENSIVE; Future    6. Vitamin D deficiency  -     VITAMIN D, 25 HYDROXY; Future         Follow-up and Dispositions    · Return in about 6 months (around 8/14/2022). I have discussed the diagnosis with the patient and the intended plan as seen in the above orders. The patient understands and agrees with the plan. The patient has received an after-visit summary and questions were answered concerning future plans. Medication Side Effects and Warnings were discussed with patient  Patient Labs were reviewed and or requested:  Patient Past Records were reviewed and or requested    Colette Corbin M.D. There are no Patient Instructions on file for this visit.

## 2022-02-15 LAB
25(OH)D3 SERPL-MCNC: 73.3 NG/ML (ref 30–100)
ALBUMIN SERPL-MCNC: 4 G/DL (ref 3.5–5)
ALBUMIN/GLOB SERPL: 1.5 {RATIO} (ref 1.1–2.2)
ALP SERPL-CCNC: 39 U/L (ref 45–117)
ALT SERPL-CCNC: 27 U/L (ref 12–78)
ANION GAP SERPL CALC-SCNC: 4 MMOL/L (ref 5–15)
AST SERPL-CCNC: 25 U/L (ref 15–37)
BILIRUB SERPL-MCNC: 0.5 MG/DL (ref 0.2–1)
BUN SERPL-MCNC: 20 MG/DL (ref 6–20)
BUN/CREAT SERPL: 21 (ref 12–20)
CALCIUM SERPL-MCNC: 8.9 MG/DL (ref 8.5–10.1)
CHLORIDE SERPL-SCNC: 95 MMOL/L (ref 97–108)
CHOLEST SERPL-MCNC: 156 MG/DL
CO2 SERPL-SCNC: 25 MMOL/L (ref 21–32)
CREAT SERPL-MCNC: 0.95 MG/DL (ref 0.7–1.3)
EST. AVERAGE GLUCOSE BLD GHB EST-MCNC: 108 MG/DL
GLOBULIN SER CALC-MCNC: 2.6 G/DL (ref 2–4)
GLUCOSE SERPL-MCNC: 90 MG/DL (ref 65–100)
HBA1C MFR BLD: 5.4 % (ref 4–5.6)
HDLC SERPL-MCNC: 60 MG/DL
HDLC SERPL: 2.6 {RATIO} (ref 0–5)
LDLC SERPL CALC-MCNC: 85.4 MG/DL (ref 0–100)
POTASSIUM SERPL-SCNC: 4.8 MMOL/L (ref 3.5–5.1)
PROT SERPL-MCNC: 6.6 G/DL (ref 6.4–8.2)
SODIUM SERPL-SCNC: 124 MMOL/L (ref 136–145)
TRIGL SERPL-MCNC: 53 MG/DL (ref ?–150)
TSH SERPL DL<=0.05 MIU/L-ACNC: 1.65 UIU/ML (ref 0.36–3.74)
VLDLC SERPL CALC-MCNC: 10.6 MG/DL

## 2022-03-18 PROBLEM — Z71.89 ADVANCED CARE PLANNING/COUNSELING DISCUSSION: Status: ACTIVE | Noted: 2017-06-12

## 2022-03-18 PROBLEM — E66.9 OBESITY (BMI 30.0-34.9): Status: ACTIVE | Noted: 2017-12-12

## 2022-03-18 PROBLEM — E66.811 OBESITY (BMI 30.0-34.9): Status: ACTIVE | Noted: 2017-12-12

## 2022-03-18 PROBLEM — R10.32 BILATERAL GROIN PAIN: Status: ACTIVE | Noted: 2019-06-14

## 2022-03-18 PROBLEM — C61 PROSTATE CANCER (HCC): Status: ACTIVE | Noted: 2020-06-30

## 2022-03-18 PROBLEM — R10.31 BILATERAL GROIN PAIN: Status: ACTIVE | Noted: 2019-06-14

## 2022-03-19 PROBLEM — Z87.19 S/P BILATERAL INGUINAL HERNIA REPAIR: Status: ACTIVE | Noted: 2018-01-19

## 2022-03-19 PROBLEM — Z98.890 S/P LEFT INGUINAL HERNIORRHAPHY: Status: ACTIVE | Noted: 2019-08-09

## 2022-03-19 PROBLEM — R73.9 ELEVATED BLOOD SUGAR: Status: ACTIVE | Noted: 2018-10-30

## 2022-03-19 PROBLEM — Z87.19 S/P LEFT INGUINAL HERNIORRHAPHY: Status: ACTIVE | Noted: 2019-08-09

## 2022-03-19 PROBLEM — Z98.890 S/P BILATERAL INGUINAL HERNIA REPAIR: Status: ACTIVE | Noted: 2018-01-19

## 2022-04-21 ENCOUNTER — OFFICE VISIT (OUTPATIENT)
Dept: FAMILY MEDICINE CLINIC | Age: 77
End: 2022-04-21
Payer: MEDICARE

## 2022-04-21 VITALS
WEIGHT: 181.8 LBS | SYSTOLIC BLOOD PRESSURE: 115 MMHG | RESPIRATION RATE: 14 BRPM | BODY MASS INDEX: 31.04 KG/M2 | DIASTOLIC BLOOD PRESSURE: 67 MMHG | HEIGHT: 64 IN | HEART RATE: 68 BPM | TEMPERATURE: 98 F | OXYGEN SATURATION: 97 %

## 2022-04-21 DIAGNOSIS — H61.23 BILATERAL IMPACTED CERUMEN: Primary | ICD-10-CM

## 2022-04-21 DIAGNOSIS — J30.1 SEASONAL ALLERGIC RHINITIS DUE TO POLLEN: ICD-10-CM

## 2022-04-21 PROCEDURE — G8752 SYS BP LESS 140: HCPCS | Performed by: FAMILY MEDICINE

## 2022-04-21 PROCEDURE — 69209 REMOVE IMPACTED EAR WAX UNI: CPT | Performed by: FAMILY MEDICINE

## 2022-04-21 PROCEDURE — 1101F PT FALLS ASSESS-DOCD LE1/YR: CPT | Performed by: FAMILY MEDICINE

## 2022-04-21 PROCEDURE — G8428 CUR MEDS NOT DOCUMENT: HCPCS | Performed by: FAMILY MEDICINE

## 2022-04-21 PROCEDURE — G8417 CALC BMI ABV UP PARAM F/U: HCPCS | Performed by: FAMILY MEDICINE

## 2022-04-21 PROCEDURE — G8754 DIAS BP LESS 90: HCPCS | Performed by: FAMILY MEDICINE

## 2022-04-21 PROCEDURE — 99203 OFFICE O/P NEW LOW 30 MIN: CPT | Performed by: FAMILY MEDICINE

## 2022-04-21 PROCEDURE — G8536 NO DOC ELDER MAL SCRN: HCPCS | Performed by: FAMILY MEDICINE

## 2022-04-21 PROCEDURE — G0463 HOSPITAL OUTPT CLINIC VISIT: HCPCS | Performed by: FAMILY MEDICINE

## 2022-04-21 PROCEDURE — G8432 DEP SCR NOT DOC, RNG: HCPCS | Performed by: FAMILY MEDICINE

## 2022-04-21 RX ORDER — MINERAL OIL
180 ENEMA (ML) RECTAL DAILY
Qty: 30 TABLET | Refills: 0 | Status: SHIPPED | OUTPATIENT
Start: 2022-04-21

## 2022-04-21 NOTE — PATIENT INSTRUCTIONS
For prevention of clogged ear wax in the future: Place 1 drop of mineral oil per ear once per week to keep ear wax soft. 2. Start allegra daily for the next week (or longer if needed) for allergy symptoms.

## 2022-04-21 NOTE — PROGRESS NOTES
Susanna Dillon is a 76 y.o. male    Chief Complaint   Patient presents with    Ear Pain     Right Ear        1. Have you been to the ER, urgent care clinic since your last visit? Hospitalized since your last visit? No    2. Have you seen or consulted any other health care providers outside of the [address] [place] [address] since your last visit? Include any pap smears or colon screening.  No

## 2022-04-21 NOTE — PROGRESS NOTES
Karla Masterson (: 1945) is a 76 y.o. male, established patient, here for evaluation of the following chief complaint(s):  Ear Pain (Right Ear )         ASSESSMENT/PLAN:  Below is the assessment and plan developed based on review of pertinent history, physical exam, labs, studies, and medications. 1. Bilateral impacted cerumen  Right ear canal free of cerumen after flushing. No otitis externa or middle ear effusion noted. Patient reports aural fullness sensation in R ear has resolved. Left ear cerumen further removed with cerumen removal spoon after unsuccessful flush. After moderate amount of cerumen removed with spoon left ear was subsequently reflushed. After this canal was clear with no remaining cerumen. No otitis externa or middle ear effusion noted. Aural fullness sensation resolved. Advised patient ear may feel irritated for next day or so after repeated flushing. However if pain, drainage or other sx develop to have repeat medical evaluation. Recommended patient use 1 drop mineral oil per ear each week to keep cerumen soft. Return to office if sx return. Advised patient he could cancel previous scheduled ENT appt as cerumen was removed today.   -     REMOVAL IMPACTED CERUMEN IRRIGATION/LVG UNILAT    2. Allergic Rhinitis   Start Allegra PRN for allergy sx     No follow-ups on file. SUBJECTIVE/OBJECTIVE:  Chief Complaint   Patient presents with    Ear Pain     Right Ear      Patient is a 75 yo male who presents to the office for sensation of aural fullness, R>L. Notes present x 2 weeks. He has a history of bilateral cerumen impaction requiring ear flushes. Denies ear pain, hearing loss, drainage, sinus pain or pressure, rhinorrhea, sore throat, cough, SOB, headache. He does have mild seasonal allergies and was sneezing a week or so ago.      Review of systems negative other than mentioned in HPI      Current Outpatient Medications on File Prior to Visit   Medication Sig Dispense Refill    olmesartan (BENICAR) 40 mg tablet TAKE 1 TABLET BY MOUTH EVERY DAY FOR HIGH BLOOD PRESSURE 90 Tablet 3    simvastatin (ZOCOR) 20 mg tablet Take 1 Tablet by mouth nightly. 90 Tablet 3    ketoconazole (NIZORAL) 2 % topical cream Apply  to affected area daily.  ascorbate calcium (TAMIKO-C PO) Take 1,000 mg by mouth daily.  green tea leaf extract (GREEN TEA PO) Take 2 Caps by mouth daily.  glucosamine/chondroitn/Na/C/Se (JOINT FORMULA PO) Take 1 Cap by mouth two (2) times a day.  OTHER Take 1 Cap by mouth daily. BLACK CUMIN SEED OIL      QUERCETIN DIHYDRATE, BULK, Take 250 mg by mouth daily.  calcium carb/magnesium oxid/D3 (CALCIUM MAGNESIUM + D PO) Take 2 Caps by mouth two (2) times a day.  multivitamin (ONE A DAY) tablet Take 1 Tab by mouth daily.  inulin/chromium picolinate (FIBER SELECT GUMMIES PO) Take 2 Each by mouth daily.  VITAMIN K2 PO Take 1 Cap by mouth two (2) times a day.  Lycopene 15 mg cap Take 1 Cap by mouth daily.  POMEGRANATE XT/POMEGRANAT SEED (POMEGRANATE PO) Take 2 Caps by mouth daily.  Omega-3-DHA-EPA-Fish Oil 1,000 (120-180) mg cap Take 2 Caps by mouth daily.  calcium citrate-vitamin d3 (CITRACAL+D) 315-200 mg-unit tab Take 1 Tab by mouth daily (with breakfast).  BORON PO Take 2 Caps by mouth daily.  COQ10, UBIQUINOL, PO Take 1 Cap by mouth two (2) times a day.  SOY ISOFLAVONE PO Take 1 Cap by mouth daily.  cholecalciferol, vitamin D3, (VITAMIN D3) 2,000 unit tab Take 10,000 Units by mouth daily.  aspirin 81 mg chewable tablet Take 81 mg by mouth daily. No current facility-administered medications on file prior to visit.      Patient Active Problem List    Diagnosis Date Noted    Prostate cancer ([facility] [address] 06/30/2020    S/P left inguinal herniorrhaphy 08/09/2019    Bilateral groin pain 06/14/2019    Elevated blood sugar 10/30/2018    S/P bilateral inguinal hernia repair 01/19/2018    Obesity (BMI 30.0-34.9) 12/12/2017    Advanced care planning/counseling discussion 06/12/2017    Osteoporosis 03/25/2015    Pre-diabetes 08/28/2013    Hypogonadism male 08/16/2012    Vitamin D deficiency 02/27/2012    Hyponatremia 02/16/2011    OCD (obsessive compulsive disorder) 08/18/2010    Anxiety state, unspecified 08/18/2010    Mixed hyperlipidemia 08/18/2010    Essential hypertension, benign 08/18/2010       Vitals:    04/21/22 0730   BP: 115/67   Pulse: 68   Resp: 14   Temp: 98 °F (36.7 °C)   TempSrc: Oral   SpO2: 97%   Weight: 181 lb 12.8 oz (82.5 kg)   Height: 5' 4\" (1.626 m)   PainSc:   0 - No pain        Physical Exam  Constitutional:       General: He is not in acute distress. Appearance: Normal appearance. He is not toxic-appearing. HENT:      Head: Normocephalic and atraumatic. Right Ear: External ear normal. No decreased hearing noted. No drainage, swelling or tenderness. There is impacted cerumen. Left Ear: External ear normal. No decreased hearing noted. No drainage, swelling or tenderness. There is impacted cerumen. Ears:      Comments: After bilateral cerumen removal,  Bilateral ear canals are free of cerumen. There is no swelling, erythema or drainage. TM has no scarring or erythema, no bulging or  Middle ear effusion. Nose: Nose normal.      Mouth/Throat:      Mouth: Mucous membranes are moist.      Pharynx: Oropharynx is clear. No oropharyngeal exudate. Eyes:      Conjunctiva/sclera: Conjunctivae normal.      Pupils: Pupils are equal, round, and reactive to light. Cardiovascular:      Rate and Rhythm: Normal rate and regular rhythm. Heart sounds: Normal heart sounds. No murmur heard. No friction rub. No gallop. Pulmonary:      Effort: Pulmonary effort is normal. No respiratory distress. Breath sounds: No wheezing or rales. Neurological:      Mental Status: He is alert.    Psychiatric:         Mood and Affect: Mood normal. Behavior: Behavior normal.         An electronic signature was used to authenticate this note.   -- Waqar Dale PA-C

## 2022-05-05 ENCOUNTER — HOSPITAL ENCOUNTER (INPATIENT)
Dept: HOSPITAL 54 - ER | Age: 77
LOS: 8 days | Discharge: HOME | DRG: 643 | End: 2022-05-13
Attending: NURSE PRACTITIONER | Admitting: NURSE PRACTITIONER
Payer: MEDICARE

## 2022-05-05 VITALS — HEIGHT: 66 IN | BODY MASS INDEX: 27.8 KG/M2 | WEIGHT: 173 LBS

## 2022-05-05 DIAGNOSIS — J98.11: ICD-10-CM

## 2022-05-05 DIAGNOSIS — Z79.899: ICD-10-CM

## 2022-05-05 DIAGNOSIS — Z79.82: ICD-10-CM

## 2022-05-05 DIAGNOSIS — K56.7: ICD-10-CM

## 2022-05-05 DIAGNOSIS — E66.9: ICD-10-CM

## 2022-05-05 DIAGNOSIS — I10: ICD-10-CM

## 2022-05-05 DIAGNOSIS — R09.02: ICD-10-CM

## 2022-05-05 DIAGNOSIS — K56.609: ICD-10-CM

## 2022-05-05 DIAGNOSIS — U07.1: ICD-10-CM

## 2022-05-05 DIAGNOSIS — E78.5: ICD-10-CM

## 2022-05-05 DIAGNOSIS — E22.2: Primary | ICD-10-CM

## 2022-05-05 DIAGNOSIS — T43.505A: ICD-10-CM

## 2022-05-05 DIAGNOSIS — D64.9: ICD-10-CM

## 2022-05-05 DIAGNOSIS — E86.1: ICD-10-CM

## 2022-05-05 DIAGNOSIS — J12.82: ICD-10-CM
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
## 2022-05-05 LAB
BASOPHILS # BLD AUTO: 0 K/UL (ref 0–0.2)
BASOPHILS NFR BLD AUTO: 0.1 % (ref 0–2)
BUN SERPL-MCNC: 14 MG/DL (ref 7–18)
BUN SERPL-MCNC: 15 MG/DL (ref 7–18)
CALCIUM SERPL-MCNC: 8.3 MG/DL (ref 8.5–10.1)
CALCIUM SERPL-MCNC: 8.3 MG/DL (ref 8.5–10.1)
CHLORIDE SERPL-SCNC: 81 MMOL/L (ref 98–107)
CHLORIDE SERPL-SCNC: 81 MMOL/L (ref 98–107)
CO2 SERPL-SCNC: 21 MMOL/L (ref 21–32)
CO2 SERPL-SCNC: 23 MMOL/L (ref 21–32)
CREAT SERPL-MCNC: 0.8 MG/DL (ref 0.6–1.3)
CREAT SERPL-MCNC: 0.8 MG/DL (ref 0.6–1.3)
EOSINOPHIL NFR BLD AUTO: 0.2 % (ref 0–6)
GLUCOSE SERPL-MCNC: 104 MG/DL (ref 74–106)
GLUCOSE SERPL-MCNC: 97 MG/DL (ref 74–106)
HCT VFR BLD AUTO: 37 % (ref 39–51)
HGB BLD-MCNC: 12.9 G/DL (ref 13.5–17.5)
LYMPHOCYTES NFR BLD AUTO: 0.5 K/UL (ref 0.8–4.8)
LYMPHOCYTES NFR BLD AUTO: 6.9 % (ref 20–44)
MCHC RBC AUTO-ENTMCNC: 35 G/DL (ref 31–36)
MCV RBC AUTO: 89 FL (ref 80–96)
MONOCYTES NFR BLD AUTO: 0.9 K/UL (ref 0.1–1.3)
MONOCYTES NFR BLD AUTO: 11.8 % (ref 2–12)
NEUTROPHILS # BLD AUTO: 6 K/UL (ref 1.8–8.9)
NEUTROPHILS NFR BLD AUTO: 81 % (ref 43–81)
PLATELET # BLD AUTO: 244 K/UL (ref 150–450)
POTASSIUM SERPL-SCNC: 4.1 MMOL/L (ref 3.5–5.1)
POTASSIUM SERPL-SCNC: 4.1 MMOL/L (ref 3.5–5.1)
RBC # BLD AUTO: 4.19 MIL/UL (ref 4.5–6)
SODIUM SERPL-SCNC: 112 MMOL/L (ref 136–145)
SODIUM SERPL-SCNC: 112 MMOL/L (ref 136–145)
WBC NRBC COR # BLD AUTO: 7.4 K/UL (ref 4.3–11)

## 2022-05-05 PROCEDURE — A9575 INJ GADOTERATE MEGLUMI 0.1ML: HCPCS

## 2022-05-05 PROCEDURE — G0378 HOSPITAL OBSERVATION PER HR: HCPCS

## 2022-05-05 PROCEDURE — C9803 HOPD COVID-19 SPEC COLLECT: HCPCS

## 2022-05-05 NOTE — NUR
TO ER BED 8. C/O BILATERRAL BLURRY VISION , PER PT "UNABLE TO FOCUS" AND L 
WRIST WEAKNESS. BEGAN AT 1700. PT COVID POS. ISOLATION PRECAUTIONS IN PLACE. NO 
FACIAL DROOP NOTED. NO WEAKNESS ON EXTREMITIES. DENIES ANY CHEST PAIN. NOT RESP 
DISTRESS. CONNECTED TO MONITOR.

## 2022-05-06 VITALS — DIASTOLIC BLOOD PRESSURE: 75 MMHG | SYSTOLIC BLOOD PRESSURE: 148 MMHG

## 2022-05-06 VITALS — SYSTOLIC BLOOD PRESSURE: 155 MMHG | DIASTOLIC BLOOD PRESSURE: 81 MMHG

## 2022-05-06 VITALS — DIASTOLIC BLOOD PRESSURE: 85 MMHG | SYSTOLIC BLOOD PRESSURE: 160 MMHG

## 2022-05-06 VITALS — SYSTOLIC BLOOD PRESSURE: 134 MMHG | DIASTOLIC BLOOD PRESSURE: 66 MMHG

## 2022-05-06 VITALS — DIASTOLIC BLOOD PRESSURE: 76 MMHG | SYSTOLIC BLOOD PRESSURE: 125 MMHG

## 2022-05-06 VITALS — SYSTOLIC BLOOD PRESSURE: 125 MMHG | DIASTOLIC BLOOD PRESSURE: 76 MMHG

## 2022-05-06 VITALS — SYSTOLIC BLOOD PRESSURE: 138 MMHG | DIASTOLIC BLOOD PRESSURE: 67 MMHG

## 2022-05-06 LAB
ALBUMIN SERPL BCP-MCNC: 2.9 G/DL (ref 3.4–5)
ALP SERPL-CCNC: 49 U/L (ref 46–116)
ALT SERPL W P-5'-P-CCNC: 49 U/L (ref 12–78)
AST SERPL W P-5'-P-CCNC: 75 U/L (ref 15–37)
BASOPHILS # BLD AUTO: 0 K/UL (ref 0–0.2)
BASOPHILS NFR BLD AUTO: 0.1 % (ref 0–2)
BILIRUB DIRECT SERPL-MCNC: 0.1 MG/DL (ref 0–0.2)
BILIRUB SERPL-MCNC: 0.7 MG/DL (ref 0.2–1)
BUN SERPL-MCNC: 13 MG/DL (ref 7–18)
BUN SERPL-MCNC: 13 MG/DL (ref 7–18)
BUN SERPL-MCNC: 14 MG/DL (ref 7–18)
BUN SERPL-MCNC: 15 MG/DL (ref 7–18)
CALCIUM SERPL-MCNC: 7.5 MG/DL (ref 8.5–10.1)
CALCIUM SERPL-MCNC: 7.5 MG/DL (ref 8.5–10.1)
CALCIUM SERPL-MCNC: 7.6 MG/DL (ref 8.5–10.1)
CALCIUM SERPL-MCNC: 7.6 MG/DL (ref 8.5–10.1)
CHLORIDE SERPL-SCNC: 84 MMOL/L (ref 98–107)
CHLORIDE SERPL-SCNC: 85 MMOL/L (ref 98–107)
CO2 SERPL-SCNC: 23 MMOL/L (ref 21–32)
CO2 SERPL-SCNC: 23 MMOL/L (ref 21–32)
CO2 SERPL-SCNC: 24 MMOL/L (ref 21–32)
CO2 SERPL-SCNC: 26 MMOL/L (ref 21–32)
CREAT SERPL-MCNC: 0.8 MG/DL (ref 0.6–1.3)
CREAT SERPL-MCNC: 0.9 MG/DL (ref 0.6–1.3)
CRP SERPL-MCNC: 0.4 MG/DL (ref 0–0.9)
EOSINOPHIL NFR BLD AUTO: 0.5 % (ref 0–6)
GLUCOSE SERPL-MCNC: 103 MG/DL (ref 74–106)
GLUCOSE SERPL-MCNC: 105 MG/DL (ref 74–106)
GLUCOSE SERPL-MCNC: 107 MG/DL (ref 74–106)
GLUCOSE SERPL-MCNC: 94 MG/DL (ref 74–106)
HCT VFR BLD AUTO: 33 % (ref 39–51)
HGB BLD-MCNC: 12 G/DL (ref 13.5–17.5)
LYMPHOCYTES NFR BLD AUTO: 0.8 K/UL (ref 0.8–4.8)
LYMPHOCYTES NFR BLD AUTO: 9.2 % (ref 20–44)
MAGNESIUM SERPL-MCNC: 1.9 MG/DL (ref 1.8–2.4)
MCHC RBC AUTO-ENTMCNC: 36 G/DL (ref 31–36)
MCV RBC AUTO: 88 FL (ref 80–96)
MONOCYTES NFR BLD AUTO: 1.1 K/UL (ref 0.1–1.3)
MONOCYTES NFR BLD AUTO: 13 % (ref 2–12)
NEUTROPHILS # BLD AUTO: 6.6 K/UL (ref 1.8–8.9)
NEUTROPHILS NFR BLD AUTO: 77.2 % (ref 43–81)
PHOSPHATE SERPL-MCNC: 2.4 MG/DL (ref 2.5–4.9)
PLATELET # BLD AUTO: 223 K/UL (ref 150–450)
POTASSIUM SERPL-SCNC: 3.8 MMOL/L (ref 3.5–5.1)
POTASSIUM SERPL-SCNC: 3.9 MMOL/L (ref 3.5–5.1)
POTASSIUM SERPL-SCNC: 4.1 MMOL/L (ref 3.5–5.1)
POTASSIUM SERPL-SCNC: 4.2 MMOL/L (ref 3.5–5.1)
PROT SERPL-MCNC: 6 G/DL (ref 6.4–8.2)
RBC # BLD AUTO: 3.77 MIL/UL (ref 4.5–6)
SODIUM SERPL-SCNC: 115 MMOL/L (ref 136–145)
SODIUM SERPL-SCNC: 117 MMOL/L (ref 136–145)
TSH SERPL DL<=0.005 MIU/L-ACNC: 0.86 UIU/ML (ref 0.36–3.74)
WBC NRBC COR # BLD AUTO: 8.6 K/UL (ref 4.3–11)

## 2022-05-06 RX ADMIN — GUAIFENESIN AND CODEINE PHOSPHATE PRN ML: 100; 10 SOLUTION ORAL at 03:00

## 2022-05-06 RX ADMIN — SIMVASTATIN SCH MG: 20 TABLET, FILM COATED ORAL at 21:58

## 2022-05-06 RX ADMIN — SODIUM CHLORIDE PRN MLS/HR: 9 INJECTION, SOLUTION INTRAVENOUS at 20:32

## 2022-05-06 RX ADMIN — GUAIFENESIN AND CODEINE PHOSPHATE PRN ML: 100; 10 SOLUTION ORAL at 10:57

## 2022-05-06 RX ADMIN — GUAIFENESIN AND CODEINE PHOSPHATE PRN ML: 100; 10 SOLUTION ORAL at 19:49

## 2022-05-06 RX ADMIN — Medication SCH EA: at 17:13

## 2022-05-06 RX ADMIN — SODIUM CHLORIDE PRN MLS/HR: 9 INJECTION, SOLUTION INTRAVENOUS at 01:28

## 2022-05-06 RX ADMIN — TEMAZEPAM PRN MG: 15 CAPSULE ORAL at 21:58

## 2022-05-06 RX ADMIN — Medication SCH MG: at 10:59

## 2022-05-06 RX ADMIN — ENOXAPARIN SODIUM SCH MG: 40 INJECTION SUBCUTANEOUS at 12:26

## 2022-05-06 NOTE — NUR
RN NOTE



PT RESTING IN BED, REMAINS IN ROOM AIR. NOT IN RESPIRATORY DISTRESS. AFEBRILE. NO COMPLAINTS 
OF PAIN AND DISCOMFORT. ALL DUE MEDS TAKEN. MORNING AND EVENING CARE DONE. NEEDS ATTENDED. 
SAFETY MEASURES FOLLOWED. WILL CONTINUE TO MONITOR.

## 2022-05-06 NOTE — NUR
RN NOTE



RECEIVED PATIENT IN BED, AWAKE, ALERT, AND VERBALLY RESPONSIVE. ABLE TO MAKE NEEDS KNOWN. 
DENIES HEADACHE OR WEAKNESS ON ALL FOUR EXTREMITIES. BREATHING EVEN AND UNLABORED. 
TOLERATING ROOM AIR. DENIES FEELING SOB. NOTED WITH MILD NON-PRODUCTIVE COUGH. ON TELE 
MONITORING. DENIES CHEST PAIN. SKIN WARM AND DRY. RIGHT HAND 20G PERIPHERAL IV, INFUSING NS 
AT 75 CC/HR. DENIES PAIN. PATIENT IS AMBULATORY. REMINDED TO USE CALL LIGHT WHEN ASSISTANCE 
IS NEEDED. URINAL WITHIN REACH. ALL PERSONAL BELONGINGS WITHIN REACH. NO BLEEDING NOTED AT 
THIS TIME. SAFETY MEASURES NOTED. BED LOW, IN LOCKED POSITION, CALL LIGHT WITHIN REACH.

## 2022-05-06 NOTE — NUR
0125 Patient with c/o dry cough, BECKY Morgan made aware with order for cough medication.  Order 
noted.

## 2022-05-06 NOTE — NUR
RN NOTE



PATIENT ACCIDENTLY REMOVED PERIPHERAL IV 20G ON RIGHT HAND. REINSERTED 20G IV ON LEFT WRIST. 
RESTARTED IVF.

## 2022-05-06 NOTE — NUR
RN NOTE



RECEIVED HOME MEDS FROM PT'S WIFE, PAXLOVID. MD MADE AWARE AND AGREED TO CONTINUE REMAINING 
MEDS. HOME MEDS GIVEN TO PHARMACY.

## 2022-05-06 NOTE — NUR
hubert rn notes 

Received  and admitted  a 76 y/o  male alert x4  ambulatory . able to make needs  known  on 
room air  from home  with admitted dx of  hyponatremia  , pts on covid  positive no sob no 
distress  noted  hx of  htn  , pre diabetic hld  v/s stable afebrile  pts is  nkda  and is 
full code ,ivf on right hand G20 intact and patent with  ns at 75cc/hr . all needs attended 
too call light  within reach ,will continue to monitor  pts.

## 2022-05-07 VITALS — DIASTOLIC BLOOD PRESSURE: 54 MMHG | SYSTOLIC BLOOD PRESSURE: 95 MMHG

## 2022-05-07 VITALS — DIASTOLIC BLOOD PRESSURE: 81 MMHG | SYSTOLIC BLOOD PRESSURE: 146 MMHG

## 2022-05-07 VITALS — SYSTOLIC BLOOD PRESSURE: 155 MMHG | DIASTOLIC BLOOD PRESSURE: 72 MMHG

## 2022-05-07 VITALS — SYSTOLIC BLOOD PRESSURE: 152 MMHG | DIASTOLIC BLOOD PRESSURE: 75 MMHG

## 2022-05-07 VITALS — SYSTOLIC BLOOD PRESSURE: 151 MMHG | DIASTOLIC BLOOD PRESSURE: 76 MMHG

## 2022-05-07 VITALS — DIASTOLIC BLOOD PRESSURE: 72 MMHG | SYSTOLIC BLOOD PRESSURE: 158 MMHG

## 2022-05-07 LAB
BUN SERPL-MCNC: 17 MG/DL (ref 7–18)
BUN SERPL-MCNC: 18 MG/DL (ref 7–18)
CALCIUM SERPL-MCNC: 7.6 MG/DL (ref 8.5–10.1)
CALCIUM SERPL-MCNC: 8 MG/DL (ref 8.5–10.1)
CHLORIDE SERPL-SCNC: 84 MMOL/L (ref 98–107)
CHLORIDE SERPL-SCNC: 86 MMOL/L (ref 98–107)
CO2 SERPL-SCNC: 23 MMOL/L (ref 21–32)
CO2 SERPL-SCNC: 23 MMOL/L (ref 21–32)
CREAT SERPL-MCNC: 0.7 MG/DL (ref 0.6–1.3)
CREAT SERPL-MCNC: 0.9 MG/DL (ref 0.6–1.3)
GLUCOSE SERPL-MCNC: 110 MG/DL (ref 74–106)
GLUCOSE SERPL-MCNC: 114 MG/DL (ref 74–106)
PHOSPHATE SERPL-MCNC: 3.1 MG/DL (ref 2.5–4.9)
POTASSIUM SERPL-SCNC: 3.8 MMOL/L (ref 3.5–5.1)
POTASSIUM SERPL-SCNC: 4.1 MMOL/L (ref 3.5–5.1)
SODIUM SERPL-SCNC: 117 MMOL/L (ref 136–145)
SODIUM SERPL-SCNC: 118 MMOL/L (ref 136–145)

## 2022-05-07 RX ADMIN — DEXTROSE MONOHYDRATE PRN MG: 50 INJECTION, SOLUTION INTRAVENOUS at 16:36

## 2022-05-07 RX ADMIN — SODIUM CHLORIDE PRN MLS/HR: 9 INJECTION, SOLUTION INTRAVENOUS at 20:55

## 2022-05-07 RX ADMIN — SODIUM CHLORIDE PRN MLS/HR: 9 INJECTION, SOLUTION INTRAVENOUS at 04:41

## 2022-05-07 RX ADMIN — FUROSEMIDE SCH MG: 10 INJECTION INTRAVENOUS at 10:16

## 2022-05-07 RX ADMIN — POLYETHYLENE GLYCOL 3350 SCH GM: 17 POWDER, FOR SOLUTION ORAL at 11:23

## 2022-05-07 RX ADMIN — Medication SCH MG: at 08:15

## 2022-05-07 RX ADMIN — ENOXAPARIN SODIUM SCH MG: 40 INJECTION SUBCUTANEOUS at 08:12

## 2022-05-07 RX ADMIN — GUAIFENESIN AND CODEINE PHOSPHATE PRN ML: 100; 10 SOLUTION ORAL at 08:10

## 2022-05-07 RX ADMIN — DOCUSATE SODIUM SCH MG: 50 LIQUID ORAL at 16:35

## 2022-05-07 RX ADMIN — ASPIRIN 81 MG SCH MG: 81 TABLET ORAL at 08:12

## 2022-05-07 RX ADMIN — Medication SCH EA: at 09:20

## 2022-05-07 RX ADMIN — SIMVASTATIN SCH MG: 20 TABLET, FILM COATED ORAL at 21:47

## 2022-05-07 RX ADMIN — DEXTROSE MONOHYDRATE PRN MG: 50 INJECTION, SOLUTION INTRAVENOUS at 22:55

## 2022-05-07 RX ADMIN — Medication SCH EA: at 09:19

## 2022-05-07 RX ADMIN — Medication SCH EA: at 16:36

## 2022-05-07 RX ADMIN — DOCUSATE SODIUM SCH MG: 50 LIQUID ORAL at 11:23

## 2022-05-07 RX ADMIN — TEMAZEPAM PRN MG: 15 CAPSULE ORAL at 21:44

## 2022-05-07 RX ADMIN — LOSARTAN POTASSIUM SCH MG: 50 TABLET, FILM COATED ORAL at 08:12

## 2022-05-07 NOTE — NUR
RN NOTE



RECEIVED CRITICAL LAB, SODIUM SERUM 117. Patient with previous LOW serum sodium levels, MD 
on call, Eddie, notified and aware since beginning of PM Shift. no new order. MD ordered BMP 
Q 4H. previously on Sodium Serum q2h. Patient on NS at 75 cc/hr, Limiting PO water intake at 
this time.



Patient AOX4. No changes in mental status. Sleeping at this time, arousable to name and 
touch. Denies headache, denies new onset weakness. Patient was able to stand up to have bed 
linen changed earlier. denied feeling of dizziness. No ataxia. Safety measures implemented, 
bed low, in locked position, call light within reach.

## 2022-05-07 NOTE — NUR
RN NOTES 

PT VOMITTED SMALL AMOUNT OF BROWNISH GASTRIC CONTENT, ZOFRAN IV GIVEN PER MD ORDER . 
CONTINUE TO MONITOR.

## 2022-05-07 NOTE — NUR
RN NOTES 

PT AT REST, NAUSEA IMPROVED , PT STATED FEELS BETTER , PT PASSING GAS, SR UP x3, CALL LIGHT 
WITHIN EASY REACH, BED LOCKED AND IN LOWEST POSITION, WILL ENDORSE TO NIGHT SHIFT NURSE FOR 
CONTINUITY OF CARE .

## 2022-05-07 NOTE — NUR
RN NOTE



RECEIVED PATIENT IN BED, AWAKE, ALERT, AND VERBALLY RESPONSIVE. AOX4. DENIES HEADACHE/NEW 
ONSET WEAKNESS. BREATHING EVEN AND UNLABORED. TOLERATING ROOM AIR. DENIES FEELING SHORTNESS 
OF BREATH. HOB ELEVATED 40 DEGREES. ON TELE MONITORING, DENIES CHEST PAIN. SKIN WARM AND 
DRY. LEFT HAND/WRIST 20G PERIPHERAL IV INTACT, INFUSING NS AT 75 CC/HR. URINAL AT BEDSIDE. 
PATIENT IS AMBULATORY. ASSISTED TO THE BATHROOM PER PATIENT REQUEST. PATIENT DENIES FEELINGS 
OF SYNCOPE. NO ATAXIA NOTED. URINATED ABOUT 100 CC OF VI COLORED URINE. ASSISTED WITH PM 
HYGIENE CARE. EMESIS BAG AT BEDSIDE. NO N/V AT THIS TIME. BED LOW, IN LOCKED POSITION, CALL 
LIGHT WITHIN REACH.

## 2022-05-07 NOTE — NUR
RN NOTE

RECEIVED PATIENT on  BED, AWAKE, ALERT, AND VERBALLY RESPONSIVE. ABLE TO MAKE NEEDS 
KNOWN.C/O CONSTIPATION, MOM GIVEN   ON TELE SR HR IN 80'S ,  DENIES CHEST PAIN. SKIN WARM 
AND DRY. RIGHT HAND 20G PERIPHERAL IV, INFUSING NS AT 75 CC/HR. DENIES PAIN. PATIENT IS 
AMBULATORY. REMINDED TO USE CALL LIGHT WHEN ASSISTANCE IS NEEDED. URINAL WITHIN REACH.  NO 
BLEEDING NOTED AT THIS TIME. SAFETY MEASURES NOTED.SR UP x3,  BED LOW AND IN  LOCKED 
POSITION, CALL LIGHT WITHIN REACH. WILL CONTINUE TO MONITOR

## 2022-05-08 VITALS — DIASTOLIC BLOOD PRESSURE: 80 MMHG | SYSTOLIC BLOOD PRESSURE: 150 MMHG

## 2022-05-08 VITALS — SYSTOLIC BLOOD PRESSURE: 154 MMHG | DIASTOLIC BLOOD PRESSURE: 64 MMHG

## 2022-05-08 VITALS — DIASTOLIC BLOOD PRESSURE: 64 MMHG | SYSTOLIC BLOOD PRESSURE: 158 MMHG

## 2022-05-08 VITALS — DIASTOLIC BLOOD PRESSURE: 48 MMHG | SYSTOLIC BLOOD PRESSURE: 130 MMHG

## 2022-05-08 VITALS — SYSTOLIC BLOOD PRESSURE: 159 MMHG | DIASTOLIC BLOOD PRESSURE: 70 MMHG

## 2022-05-08 VITALS — DIASTOLIC BLOOD PRESSURE: 66 MMHG | SYSTOLIC BLOOD PRESSURE: 154 MMHG

## 2022-05-08 LAB
ALBUMIN SERPL BCP-MCNC: 2.9 G/DL (ref 3.4–5)
ALP SERPL-CCNC: 47 U/L (ref 46–116)
ALT SERPL W P-5'-P-CCNC: 62 U/L (ref 12–78)
AST SERPL W P-5'-P-CCNC: 47 U/L (ref 15–37)
BILIRUB DIRECT SERPL-MCNC: 0.2 MG/DL (ref 0–0.2)
BILIRUB SERPL-MCNC: 0.7 MG/DL (ref 0.2–1)
BUN SERPL-MCNC: 32 MG/DL (ref 7–18)
CALCIUM SERPL-MCNC: 8.1 MG/DL (ref 8.5–10.1)
CHLORIDE SERPL-SCNC: 84 MMOL/L (ref 98–107)
CO2 SERPL-SCNC: 29 MMOL/L (ref 21–32)
CREAT SERPL-MCNC: 1.1 MG/DL (ref 0.6–1.3)
GLUCOSE SERPL-MCNC: 144 MG/DL (ref 74–106)
POTASSIUM SERPL-SCNC: 3.7 MMOL/L (ref 3.5–5.1)
PROT SERPL-MCNC: 6 G/DL (ref 6.4–8.2)
SODIUM SERPL-SCNC: 120 MMOL/L (ref 136–145)

## 2022-05-08 RX ADMIN — POLYETHYLENE GLYCOL 3350 SCH GM: 17 POWDER, FOR SOLUTION ORAL at 08:28

## 2022-05-08 RX ADMIN — LOSARTAN POTASSIUM SCH MG: 50 TABLET, FILM COATED ORAL at 08:28

## 2022-05-08 RX ADMIN — FUROSEMIDE SCH MG: 10 INJECTION INTRAVENOUS at 08:28

## 2022-05-08 RX ADMIN — SODIUM CHLORIDE PRN MLS/HR: 9 INJECTION, SOLUTION INTRAVENOUS at 10:18

## 2022-05-08 RX ADMIN — Medication SCH EA: at 09:00

## 2022-05-08 RX ADMIN — ENOXAPARIN SODIUM SCH MG: 40 INJECTION SUBCUTANEOUS at 08:29

## 2022-05-08 RX ADMIN — DEXTROSE MONOHYDRATE PRN MG: 50 INJECTION, SOLUTION INTRAVENOUS at 04:49

## 2022-05-08 RX ADMIN — Medication SCH MG: at 08:28

## 2022-05-08 RX ADMIN — SIMVASTATIN SCH MG: 20 TABLET, FILM COATED ORAL at 22:00

## 2022-05-08 RX ADMIN — ASPIRIN 81 MG SCH MG: 81 TABLET ORAL at 08:28

## 2022-05-08 RX ADMIN — DOCUSATE SODIUM SCH MG: 50 LIQUID ORAL at 08:28

## 2022-05-08 RX ADMIN — DOCUSATE SODIUM SCH MG: 50 LIQUID ORAL at 16:19

## 2022-05-08 NOTE — NUR
RN OPENING NOTE



RECEIVED PT IN BED, RESTING, A/O X 4. ON ROOM AIR TOLERATING WELL. NO SIGNS OF ACUTE 
DISTRESS NOTED AT THIS TIME. PT ON NGT WITH LOW INTERMITTENT SUCTION, TOLERATING WELL. IV 
ACCES AT LEFT WRIST 20G IV IN PLACE RUNNING NS AT 75CC/HR. NO SIGNS OF INFILTRATION NOTED.  
PT IS NPO. ALL SAFETY MEASURES IN PLACE. BED LOCKED AND IN LOWEST POSITION, CALL LIGHT 
WITHIN REACH. WILL CONTINUE TO MONITOR.

## 2022-05-08 NOTE — NUR
RN NOTE



NG TUBE PLACED AND PUT ON LOW INTERMITTENT SUCTION AS ORDERED. 400CC OF DARK GASTRIC 
DRAINAGE OUT. STAT CXR ORDERED TO CONFIRM PLACEMENT.

## 2022-05-08 NOTE — NUR
RN NOTE



PATIENT NOTED WITH 1 EPISODE OF VOMITING. SMALL AMOUNT OF BROWN COLORED EMESIS. ELEVATED HOB 
90 DEGREES. PROVIDED WITH ICE CHIPS. ADMINISTERED ZOFRAN VIA IV ROUTE. NO S/S OF ASPIRATION. 
 PATIENT FELT BETTER AFTER VOMITING. WILL CONTINUE TO MONITOR.

## 2022-05-08 NOTE — NUR
RN OPENING NOTE



PATIENT RECEIVED IN BED, RESTING. PATIENT ON ROOM AIR WITH NO SIGNS OF LABORED BREATHING AT 
THIS TIME. LEFT WRIST 20G IV IN PLACE RUNNING NS AT 75CC/HR. PT ON FLUID RESTRICTION AT THIS 
TIME. BED LOCKED AND IN LOWEST POSITION, CALL LIGHT WITHIN REACH, 3 SIDE RAILS UP.

## 2022-05-08 NOTE — NUR
RN NOTE



PATIENT HAD ONE EPISODE OF EMESIS. NO ASPIRATION. PATIENT CLEANED. ASSISTED PATIENT TO THE 
TOILET. PATIENT HAD A BOWEL MOVEMENT. PATIENT VERBALIZED FEELING RELIEF.

## 2022-05-08 NOTE — NUR
RN CLOSING NOTE



PATIENT REMAINS IN BED, AWAKE, A&OX4. PATIENT ON ROOM AIR WITH NO SIGNS OF LABORED BREATHING 
AT THIS TIME. LEFT WRIST 20G IV IN PLACE RUNNING NS AT 75CC/HR. NG TUBE IN PLACE ON LOW 
INTERMITTENT SUCTION, 400CC OUTPUT. BED LOCKED AND IN LOWEST POSITION, CALL LIGHT WITHIN 
REACH, 3 SIDE RAILS UP. ALL NEEDS ATTENDED DURING SHIFT. WILL ENDORSE TO NIGHT SHIFT NURSE.

## 2022-05-08 NOTE — NUR
RN NOTE



ORDERED RECEIVED FROM DR. TERRY FOR 3% NACL AT 40CC/HR FOR 5 HOURS, HOLD NS DURING NACL 
AND RESUME AFTER. DRAW SERUM SODIUM AFTER FULL 5HOURS OF NACL COMPLETED. INSERT BALL 
CATHETER. ORDER PLACED.

## 2022-05-08 NOTE — NUR
RN NOTE



RECEIVED REPORT FROM LELA FOR MARKO. PT AWAKE A0X4. DENIES ANY PAIN OR SOB. WITH NGT 
CONNECTED TO SUCTION, WITH GREENISH DRAINAGE. IV ON L WRIST PATENT AND INTACT, INFUSING NS 
3% AT 40ML/HR. BALL IN PLACE WITH YELLOW URINE OUTPUT. WILL CONTINUE TO MONITOR.

## 2022-05-09 VITALS — DIASTOLIC BLOOD PRESSURE: 65 MMHG | SYSTOLIC BLOOD PRESSURE: 151 MMHG

## 2022-05-09 VITALS — SYSTOLIC BLOOD PRESSURE: 131 MMHG | DIASTOLIC BLOOD PRESSURE: 61 MMHG

## 2022-05-09 VITALS — SYSTOLIC BLOOD PRESSURE: 147 MMHG | DIASTOLIC BLOOD PRESSURE: 64 MMHG

## 2022-05-09 VITALS — SYSTOLIC BLOOD PRESSURE: 137 MMHG | DIASTOLIC BLOOD PRESSURE: 60 MMHG

## 2022-05-09 VITALS — DIASTOLIC BLOOD PRESSURE: 58 MMHG | SYSTOLIC BLOOD PRESSURE: 140 MMHG

## 2022-05-09 VITALS — SYSTOLIC BLOOD PRESSURE: 149 MMHG | DIASTOLIC BLOOD PRESSURE: 63 MMHG

## 2022-05-09 LAB
ALBUMIN SERPL BCP-MCNC: 2.8 G/DL (ref 3.4–5)
ALP SERPL-CCNC: 47 U/L (ref 46–116)
ALT SERPL W P-5'-P-CCNC: 53 U/L (ref 12–78)
AST SERPL W P-5'-P-CCNC: 31 U/L (ref 15–37)
BASOPHILS # BLD AUTO: 0 K/UL (ref 0–0.2)
BASOPHILS NFR BLD AUTO: 0.2 % (ref 0–2)
BILIRUB SERPL-MCNC: 0.6 MG/DL (ref 0.2–1)
BUN SERPL-MCNC: 40 MG/DL (ref 7–18)
CALCIUM SERPL-MCNC: 8.1 MG/DL (ref 8.5–10.1)
CHLORIDE SERPL-SCNC: 89 MMOL/L (ref 98–107)
CO2 SERPL-SCNC: 33 MMOL/L (ref 21–32)
CREAT SERPL-MCNC: 1.1 MG/DL (ref 0.6–1.3)
EOSINOPHIL NFR BLD AUTO: 0.1 % (ref 0–6)
GLUCOSE SERPL-MCNC: 109 MG/DL (ref 74–106)
HCT VFR BLD AUTO: 38 % (ref 39–51)
HGB BLD-MCNC: 13.1 G/DL (ref 13.5–17.5)
LYMPHOCYTES NFR BLD AUTO: 0.7 K/UL (ref 0.8–4.8)
LYMPHOCYTES NFR BLD AUTO: 6.2 % (ref 20–44)
MCHC RBC AUTO-ENTMCNC: 34 G/DL (ref 31–36)
MCV RBC AUTO: 90 FL (ref 80–96)
MONOCYTES NFR BLD AUTO: 1.2 K/UL (ref 0.1–1.3)
MONOCYTES NFR BLD AUTO: 11.1 % (ref 2–12)
NEUTROPHILS # BLD AUTO: 8.9 K/UL (ref 1.8–8.9)
NEUTROPHILS NFR BLD AUTO: 82.4 % (ref 43–81)
PLATELET # BLD AUTO: 278 K/UL (ref 150–450)
POTASSIUM SERPL-SCNC: 3.7 MMOL/L (ref 3.5–5.1)
PROT SERPL-MCNC: 5.9 G/DL (ref 6.4–8.2)
RBC # BLD AUTO: 4.23 MIL/UL (ref 4.5–6)
SODIUM SERPL-SCNC: 126 MMOL/L (ref 136–145)
WBC NRBC COR # BLD AUTO: 10.8 K/UL (ref 4.3–11)

## 2022-05-09 RX ADMIN — ENOXAPARIN SODIUM SCH MG: 40 INJECTION SUBCUTANEOUS at 09:00

## 2022-05-09 RX ADMIN — ASPIRIN 81 MG SCH MG: 81 TABLET ORAL at 09:00

## 2022-05-09 RX ADMIN — FUROSEMIDE SCH MG: 10 INJECTION INTRAVENOUS at 09:30

## 2022-05-09 RX ADMIN — Medication SCH MG: at 09:00

## 2022-05-09 RX ADMIN — DOCUSATE SODIUM SCH MG: 50 LIQUID ORAL at 17:00

## 2022-05-09 RX ADMIN — POLYETHYLENE GLYCOL 3350 SCH GM: 17 POWDER, FOR SOLUTION ORAL at 09:00

## 2022-05-09 RX ADMIN — DOCUSATE SODIUM SCH MG: 50 LIQUID ORAL at 09:00

## 2022-05-09 RX ADMIN — LOSARTAN POTASSIUM SCH MG: 50 TABLET, FILM COATED ORAL at 09:00

## 2022-05-09 RX ADMIN — SIMVASTATIN SCH MG: 20 TABLET, FILM COATED ORAL at 22:00

## 2022-05-09 RX ADMIN — SODIUM CHLORIDE PRN MLS/HR: 9 INJECTION, SOLUTION INTRAVENOUS at 02:16

## 2022-05-09 RX ADMIN — SODIUM CHLORIDE PRN MLS/HR: 9 INJECTION, SOLUTION INTRAVENOUS at 15:23

## 2022-05-09 NOTE — NUR
RN OPENING NOTE



RECEIVED PATIENT IN BED, AWAKE, A/O X 4. ON ROOM AIR TOLERATING WELL. BREATHING EVEN AND 
UNLABORED. NO SIGNS OF ACUTE DISTRESS NOTED AT THIS TIME. PT ON NGT WITH LOW INTERMITTENT 
SUCTION, TOLERATING WELL. PT IS NPO. IV ACCES AT LEFT WRIST 20G PATENT AND INTACT, INFUSING 
NS AT 75CC/HR. NO SIGNS OF INFILTRATION NOTED.  ALL SAFETY MEASURES IN PLACE. BED LOCKED AND 
IN LOWEST POSITION, CALL LIGHT WITHIN REACH. WILL CONTINUE TO MONITOR PATIENT ACCORDINGLY 
THROUGHOUT SHIFT.

## 2022-05-09 NOTE — NUR
RN NOTE



PT SLEEPING, AROUSES EASILY. NO CHANGES IN LOC NOTED. CONTINUE WITH NG CONNECTED TO LOW 
INTERMITTENT SUCTION WITH 1350 ML TOTAL DRAINAGE OUTPUT. DENIES PAIN OR SOB. REMAIN 
AFEBRILE. BALL DRAINING WELL. WILL ENDORSE TO NEXT SHIFT NURSE FOR MARKO.

## 2022-05-09 NOTE — NUR
RN NOTE



PATIENT AWAKE, ALERT, AND ORIENTED X4. ON ROOM AIR, NO S/S OF ANY ACUTE RESPIRATORY 
DISTRESS. DENIES ANY PAIN AT THIS TIME. BALL CATH IN PLACE, DRAINING VIA GRAVITY. NGT IN 
PLACE, CLAMPED. XRAY AT BEDSIDE FOR SMALL BOWEL FOLLOW THROUGH. IV ACCESS ON LEFT WRIST #20 
INFUSING NS @ 75ML/HR. BED LOCKED AND IN LOWEST POSITION. SAFETY MEASURES MAINTAINED. CALL 
LIGHT WITHIN REACH. ALL NEEDS ANTICIPATED.

## 2022-05-09 NOTE — NUR
RN CLOSING NOTES



PATIENT REMAINS IN STABLE CONDITION THROUGHOUT SHIFT. PATIENT  AWAKE, A/O X 4. ON ROOM AIR 
TOLERATING WELL. BREATHING EVEN AND UNLABORED. NO SIGNS OF ACUTE DISTRESS NOTED AT THIS 
TIME. PT ON NGT WITH LOW INTERMITTENT SUCTION, TOLERATING WELL WITH OUTPUT  ML DURING 
SHIFT. PT IS STILL ON NPO PENDING SMALL BOWEL FOLLOW THROUGH. HELD ALL PO APPLICABLE MEDS. 
KEPT PATIENT CLEAN DRY AND COMFORTABLE. ALL NEEDS ATTENDED. IV ACCES AT LEFT WRIST 20G 
PATENT AND INTACT, INFUSING NS AT 75CC/HR. NO SIGNS OF INFILTRATION NOTED.  ALL SAFETY 
MEASURES IN PLACE. BED LOCKED AND IN LOWEST POSITION, CALL LIGHT WITHIN REACH. WILL ENDORSE 
TO ONCOMING NURSE FOR CONTINUITY OF CARE.

## 2022-05-10 VITALS — DIASTOLIC BLOOD PRESSURE: 65 MMHG | SYSTOLIC BLOOD PRESSURE: 147 MMHG

## 2022-05-10 VITALS — SYSTOLIC BLOOD PRESSURE: 138 MMHG | DIASTOLIC BLOOD PRESSURE: 66 MMHG

## 2022-05-10 VITALS — DIASTOLIC BLOOD PRESSURE: 65 MMHG | SYSTOLIC BLOOD PRESSURE: 157 MMHG

## 2022-05-10 VITALS — SYSTOLIC BLOOD PRESSURE: 142 MMHG | DIASTOLIC BLOOD PRESSURE: 68 MMHG

## 2022-05-10 VITALS — DIASTOLIC BLOOD PRESSURE: 66 MMHG | SYSTOLIC BLOOD PRESSURE: 149 MMHG

## 2022-05-10 VITALS — DIASTOLIC BLOOD PRESSURE: 64 MMHG | SYSTOLIC BLOOD PRESSURE: 132 MMHG

## 2022-05-10 LAB
ALBUMIN SERPL BCP-MCNC: 2.6 G/DL (ref 3.4–5)
ALP SERPL-CCNC: 38 U/L (ref 46–116)
ALT SERPL W P-5'-P-CCNC: 43 U/L (ref 12–78)
AST SERPL W P-5'-P-CCNC: 25 U/L (ref 15–37)
BILIRUB SERPL-MCNC: 0.5 MG/DL (ref 0.2–1)
BUN SERPL-MCNC: 46 MG/DL (ref 7–18)
CALCIUM SERPL-MCNC: 8.1 MG/DL (ref 8.5–10.1)
CHLORIDE SERPL-SCNC: 96 MMOL/L (ref 98–107)
CO2 SERPL-SCNC: 28 MMOL/L (ref 21–32)
CREAT SERPL-MCNC: 1.1 MG/DL (ref 0.6–1.3)
GLUCOSE SERPL-MCNC: 134 MG/DL (ref 74–106)
POTASSIUM SERPL-SCNC: 3.5 MMOL/L (ref 3.5–5.1)
PROT SERPL-MCNC: 5.7 G/DL (ref 6.4–8.2)
SODIUM SERPL-SCNC: 132 MMOL/L (ref 136–145)

## 2022-05-10 RX ADMIN — SIMVASTATIN SCH MG: 20 TABLET, FILM COATED ORAL at 21:41

## 2022-05-10 RX ADMIN — POLYETHYLENE GLYCOL 3350 SCH GM: 17 POWDER, FOR SOLUTION ORAL at 09:10

## 2022-05-10 RX ADMIN — SODIUM CHLORIDE PRN MLS/HR: 9 INJECTION, SOLUTION INTRAVENOUS at 04:13

## 2022-05-10 RX ADMIN — ENOXAPARIN SODIUM SCH MG: 40 INJECTION SUBCUTANEOUS at 09:16

## 2022-05-10 RX ADMIN — LOSARTAN POTASSIUM SCH MG: 50 TABLET, FILM COATED ORAL at 09:10

## 2022-05-10 RX ADMIN — DOCUSATE SODIUM SCH MG: 50 LIQUID ORAL at 17:51

## 2022-05-10 RX ADMIN — Medication SCH MG: at 09:08

## 2022-05-10 RX ADMIN — DOCUSATE SODIUM SCH MG: 50 LIQUID ORAL at 09:10

## 2022-05-10 RX ADMIN — FUROSEMIDE SCH MG: 10 INJECTION INTRAVENOUS at 09:09

## 2022-05-10 RX ADMIN — ASPIRIN 81 MG SCH MG: 81 TABLET ORAL at 09:08

## 2022-05-10 NOTE — NUR
RN NOTE



PATIENT RESTING IN BED. ON O2 3L VIA NASAL CANNULA, NO SOB NOTED. BALL CATH IN PLACE, 
OUTPUT 800. NGT IN PLACE, CONTINUED LOW INTERMITTENT SUCTION POST XRAY SMALL BOWEL. NOTED 
WITH 400CC DARK GREEN GASTRIC FLUID. IV ACCESS ON LEFT WRIST #20 INFUSING NS @ 75ML/HR. BED 
LOCKED AND IN LOWEST POSITION. SAFETY MEASURES MAINTAINED. CALL LIGHT WITHIN REACH. WILL 
ENDORSE TO AM SHIFT.

## 2022-05-10 NOTE — NUR
RN NOTE



PT RECEIVED IN BED. CURRENTLY ON 3L OF O2 VIA NC SHOWING NO S/SX OF RESP DISTRESS. BREATHING 
EVEN AND UNLABORED. PT IS A/O X 4. NGT NOTED ON WITH LOW INTERMITTENT SUCTION DRAINING DARK 
GREEN GASTRIC FLUID. PT IS NPO. IV ACCESS NOTED AT LEFT WRIST 20G PATENT AND INTACT, 
INFUSING NS AT 75CC/HR. NO SIGNS OF INFILTRATION.  ALL SAFETY MEASURES IMPLEMENTED. CALL 
LIGHT WITHIN REACH. BED LOCKED AND IN LOWEST POSITION, CALL LIGHT WITHIN REACH. WILL 
CONTINUE TO MONITOR PATIENT AND ASSESS FOR ANY CHANGES DURING SHIFT.

## 2022-05-11 VITALS — DIASTOLIC BLOOD PRESSURE: 48 MMHG | SYSTOLIC BLOOD PRESSURE: 161 MMHG

## 2022-05-11 VITALS — DIASTOLIC BLOOD PRESSURE: 63 MMHG | SYSTOLIC BLOOD PRESSURE: 163 MMHG

## 2022-05-11 VITALS — DIASTOLIC BLOOD PRESSURE: 63 MMHG | SYSTOLIC BLOOD PRESSURE: 153 MMHG

## 2022-05-11 VITALS — DIASTOLIC BLOOD PRESSURE: 68 MMHG | SYSTOLIC BLOOD PRESSURE: 153 MMHG

## 2022-05-11 VITALS — DIASTOLIC BLOOD PRESSURE: 62 MMHG | SYSTOLIC BLOOD PRESSURE: 154 MMHG

## 2022-05-11 VITALS — SYSTOLIC BLOOD PRESSURE: 152 MMHG | DIASTOLIC BLOOD PRESSURE: 62 MMHG

## 2022-05-11 LAB
ALBUMIN SERPL BCP-MCNC: 2.2 G/DL (ref 3.4–5)
ALP SERPL-CCNC: 41 U/L (ref 46–116)
ALT SERPL W P-5'-P-CCNC: 40 U/L (ref 12–78)
AST SERPL W P-5'-P-CCNC: 37 U/L (ref 15–37)
BASOPHILS # BLD AUTO: 0 K/UL (ref 0–0.2)
BASOPHILS NFR BLD AUTO: 0 % (ref 0–2)
BILIRUB SERPL-MCNC: 0.5 MG/DL (ref 0.2–1)
BUN SERPL-MCNC: 38 MG/DL (ref 7–18)
CALCIUM SERPL-MCNC: 8 MG/DL (ref 8.5–10.1)
CHLORIDE SERPL-SCNC: 102 MMOL/L (ref 98–107)
CO2 SERPL-SCNC: 28 MMOL/L (ref 21–32)
CREAT SERPL-MCNC: 0.9 MG/DL (ref 0.6–1.3)
EOSINOPHIL NFR BLD AUTO: 0.4 % (ref 0–6)
GLUCOSE SERPL-MCNC: 93 MG/DL (ref 74–106)
HCT VFR BLD AUTO: 34 % (ref 39–51)
HGB BLD-MCNC: 11.5 G/DL (ref 13.5–17.5)
LYMPHOCYTES NFR BLD AUTO: 0.5 K/UL (ref 0.8–4.8)
LYMPHOCYTES NFR BLD AUTO: 6.4 % (ref 20–44)
MCHC RBC AUTO-ENTMCNC: 34 G/DL (ref 31–36)
MCV RBC AUTO: 92 FL (ref 80–96)
MONOCYTES NFR BLD AUTO: 0.8 K/UL (ref 0.1–1.3)
MONOCYTES NFR BLD AUTO: 9.7 % (ref 2–12)
NEUTROPHILS # BLD AUTO: 7.1 K/UL (ref 1.8–8.9)
NEUTROPHILS NFR BLD AUTO: 83.5 % (ref 43–81)
PLATELET # BLD AUTO: 255 K/UL (ref 150–450)
POTASSIUM SERPL-SCNC: 3.3 MMOL/L (ref 3.5–5.1)
PROT SERPL-MCNC: 5.4 G/DL (ref 6.4–8.2)
RBC # BLD AUTO: 3.64 MIL/UL (ref 4.5–6)
SODIUM SERPL-SCNC: 139 MMOL/L (ref 136–145)
WBC NRBC COR # BLD AUTO: 8.5 K/UL (ref 4.3–11)

## 2022-05-11 RX ADMIN — SODIUM CHLORIDE PRN MLS/HR: 9 INJECTION, SOLUTION INTRAVENOUS at 00:29

## 2022-05-11 RX ADMIN — FUROSEMIDE SCH MG: 10 INJECTION INTRAVENOUS at 08:53

## 2022-05-11 RX ADMIN — POLYETHYLENE GLYCOL 3350 SCH GM: 17 POWDER, FOR SOLUTION ORAL at 08:43

## 2022-05-11 RX ADMIN — LOSARTAN POTASSIUM SCH MG: 50 TABLET, FILM COATED ORAL at 08:42

## 2022-05-11 RX ADMIN — DOCUSATE SODIUM SCH MG: 50 LIQUID ORAL at 16:22

## 2022-05-11 RX ADMIN — POTASSIUM CHLORIDE SCH MLS/HR: 200 INJECTION, SOLUTION INTRAVENOUS at 13:29

## 2022-05-11 RX ADMIN — ENOXAPARIN SODIUM SCH MG: 40 INJECTION SUBCUTANEOUS at 08:52

## 2022-05-11 RX ADMIN — Medication SCH MG: at 08:43

## 2022-05-11 RX ADMIN — ASPIRIN 81 MG SCH MG: 81 TABLET ORAL at 08:42

## 2022-05-11 RX ADMIN — DOCUSATE SODIUM SCH MG: 50 LIQUID ORAL at 08:42

## 2022-05-11 RX ADMIN — SIMVASTATIN SCH MG: 20 TABLET, FILM COATED ORAL at 21:34

## 2022-05-11 RX ADMIN — POTASSIUM CHLORIDE SCH MLS/HR: 200 INJECTION, SOLUTION INTRAVENOUS at 12:09

## 2022-05-11 RX ADMIN — SODIUM CHLORIDE PRN MLS/HR: 9 INJECTION, SOLUTION INTRAVENOUS at 13:31

## 2022-05-11 NOTE — NUR
RN notes:

seen by Leah NP surgeon who reviewed KUB result, aware that pt had large loose BM with 
order to DC NGT and start clear liquid diet and advance as tolerated, NGT remived, pt 
started to drink apple juice well tolerated, will monitor pt

## 2022-05-11 NOTE — NUR
RN NOTE



RECEIVED PATIENT IN BED, AWAKE, ALERT, AND VERBALLY RESPONSIVE. AOX4, ABLE TO MAKE NEEDS 
KNOWN. BREATHING EVEN AND UNLABORED. NOTED WITH COUGH. NO SPUTUM. DENIES SOB. ON ROOM AIR. 
HOB ELEVATED SEMI SR. NOT ON TELE MONITORING. PATIENT DENIES CHEST PAIN. SKIN WARM AND 
DRY. LEFT WRIST 20G PERIPHERAL IV IS INTACT AND FLUSHING WELL WITH NO INFILTRATION. PATIENT 
DENIES PAIN ON SITE. NO IVF RUNNING. DENIES ABDOMINAL PAIN. INDWELLING BALL CATHETER 
INTACT, NO BLEEDING, DRAINING BY GRAVITY. BED LOW, IN LOCKED POSITION, CALL LIGHT WITHIN 
REACH. ALL BELONGINGS WITHIN REACH. WILL CONTINUE TO MONITOR.

## 2022-05-11 NOTE — NUR
RN NOTE



MADE PATIENT ROUNDING AND NG TUBE WAS ON THE FLOOR. RE-INSERTED NG TUBE, ASPIRATED,  
AUSCULTATED, AND ORDERED STAT CHEST XRAY.

## 2022-05-11 NOTE — NUR
RN OPENING NOTES:

PT RECEIVED IN BED. CURRENTLY ON 3L OF O2 VIA NC SHOWING NO S/SX OF RESP DISTRESS. BREATHING 
EVEN AND UNLABORED. PT IS A/O X 3-4. NGT NOTED ON WITH LOW INTERMITTENT SUCTION DRAINING 
DARK GREEN GASTRIC FLUID. PT IS NPO. IV ACCESS NOTED AT LEFT WRIST 20G PATENT AND INTACT, 
INFUSING NS AT 75CC/HR. NO SIGNS OF INFILTRATION.  ALL SAFETY MEASURES IMPLEMENTED. CALL 
LIGHT WITHIN REACH. BED LOCKED AND IN LOWEST POSITION, CALL LIGHT WITHIN REACH. WILL 
CONTINUE TO MONITOR PATIENT AND ASSESS FOR ANY CHANGES DURING SHIFT.

## 2022-05-11 NOTE — NUR
RN NOTE



NO CHANGES IN PT CONDITION DURING SHIFT. CURRENTLY ON 3L OF O2 VIA NC SHOWING NO S/SX OF 
RESP DISTRESS. BREATHING EVEN AND UNLABORED. IV ACCESS NOTED AT LEFT WRIST 20G PATENT AND 
INTACT, INFUSING NS AT 75CC/HR. NO SIGNS OF INFILTRATION.  ALL SAFETY MEASURES IMPLEMENTED. 
PT KEPT CLEAN AND COMFORTABLE. ALL DUE MEDS GIVEN AS ORDERED. CALL LIGHT WITHIN REACH. BED 
LOCKED AND IN LOWEST POSITION, CALL LIGHT WITHIN REACH. WILL ENDORSE TO MORNING SHIFT RN FOR 
MARKO.

## 2022-05-11 NOTE — NUR
RN  closing notes:





PATIENT  IN STABLE CONDITION THROUGHOUT SHIFT. PATIENT  AWAKE, A/O X 4. ON ROOM AIR 
TOLERATING WELL,2 sat 96%. BREATHING EVEN AND UNLABORED. NO SIGNS OF ACUTE DISTRESS NOTED AT 
THIS TIME. PT TOLERATED HIS DIET LIQUID DIET WELL,. HELD ALL PO APPLICABLE MEDS. KEPT 
PATIENT CLEAN DRY AND COMFORTABLE. ALL NEEDS ATTENDED. IV ACCES AT LEFT WRIST 20G PATENT AND 
INTACT, iv FLUID WAS DISCONTINUED.  ALL SAFETY MEASURES IN PLACE. BED LOCKED AND IN LOWEST 
POSITION, CALL LIGHT WITHIN REACH. WILL ENDORSE TO ONCOMING NURSE FOR CONTINUITY OF CARE

## 2022-05-11 NOTE — NUR
RN notes:

weaning trail to take off oxygen done pt left 30 moniutes without oxygen denies any SOB, O2 
sat 96%

## 2022-05-12 VITALS — SYSTOLIC BLOOD PRESSURE: 154 MMHG | DIASTOLIC BLOOD PRESSURE: 65 MMHG

## 2022-05-12 VITALS — SYSTOLIC BLOOD PRESSURE: 150 MMHG | DIASTOLIC BLOOD PRESSURE: 68 MMHG

## 2022-05-12 VITALS — SYSTOLIC BLOOD PRESSURE: 155 MMHG | DIASTOLIC BLOOD PRESSURE: 75 MMHG

## 2022-05-12 VITALS — SYSTOLIC BLOOD PRESSURE: 161 MMHG | DIASTOLIC BLOOD PRESSURE: 70 MMHG

## 2022-05-12 VITALS — SYSTOLIC BLOOD PRESSURE: 163 MMHG | DIASTOLIC BLOOD PRESSURE: 69 MMHG

## 2022-05-12 LAB
ALBUMIN SERPL BCP-MCNC: 2.1 G/DL (ref 3.4–5)
ALP SERPL-CCNC: 43 U/L (ref 46–116)
ALT SERPL W P-5'-P-CCNC: 38 U/L (ref 12–78)
AST SERPL W P-5'-P-CCNC: 38 U/L (ref 15–37)
BASOPHILS # BLD AUTO: 0 K/UL (ref 0–0.2)
BASOPHILS NFR BLD AUTO: 0.1 % (ref 0–2)
BILIRUB SERPL-MCNC: 0.5 MG/DL (ref 0.2–1)
BUN SERPL-MCNC: 30 MG/DL (ref 7–18)
CALCIUM SERPL-MCNC: 8.3 MG/DL (ref 8.5–10.1)
CHLORIDE SERPL-SCNC: 102 MMOL/L (ref 98–107)
CO2 SERPL-SCNC: 32 MMOL/L (ref 21–32)
CREAT SERPL-MCNC: 0.8 MG/DL (ref 0.6–1.3)
EOSINOPHIL NFR BLD AUTO: 1.9 % (ref 0–6)
GLUCOSE SERPL-MCNC: 103 MG/DL (ref 74–106)
HCT VFR BLD AUTO: 34 % (ref 39–51)
HGB BLD-MCNC: 11.5 G/DL (ref 13.5–17.5)
LYMPHOCYTES NFR BLD AUTO: 0.5 K/UL (ref 0.8–4.8)
LYMPHOCYTES NFR BLD AUTO: 7.4 % (ref 20–44)
MCHC RBC AUTO-ENTMCNC: 34 G/DL (ref 31–36)
MCV RBC AUTO: 91 FL (ref 80–96)
MONOCYTES NFR BLD AUTO: 1 K/UL (ref 0.1–1.3)
MONOCYTES NFR BLD AUTO: 14 % (ref 2–12)
NEUTROPHILS # BLD AUTO: 5.5 K/UL (ref 1.8–8.9)
NEUTROPHILS NFR BLD AUTO: 76.6 % (ref 43–81)
PLATELET # BLD AUTO: 283 K/UL (ref 150–450)
POTASSIUM SERPL-SCNC: 3.3 MMOL/L (ref 3.5–5.1)
PROT SERPL-MCNC: 5.4 G/DL (ref 6.4–8.2)
RBC # BLD AUTO: 3.67 MIL/UL (ref 4.5–6)
SODIUM SERPL-SCNC: 140 MMOL/L (ref 136–145)
WBC NRBC COR # BLD AUTO: 7.1 K/UL (ref 4.3–11)

## 2022-05-12 RX ADMIN — ASPIRIN 81 MG SCH MG: 81 TABLET ORAL at 08:47

## 2022-05-12 RX ADMIN — DOCUSATE SODIUM SCH MG: 50 LIQUID ORAL at 08:47

## 2022-05-12 RX ADMIN — DOCUSATE SODIUM SCH MG: 50 LIQUID ORAL at 16:45

## 2022-05-12 RX ADMIN — Medication SCH MG: at 08:49

## 2022-05-12 RX ADMIN — SIMVASTATIN SCH MG: 20 TABLET, FILM COATED ORAL at 22:00

## 2022-05-12 RX ADMIN — FUROSEMIDE SCH MG: 10 INJECTION INTRAVENOUS at 08:47

## 2022-05-12 RX ADMIN — POLYETHYLENE GLYCOL 3350 SCH GM: 17 POWDER, FOR SOLUTION ORAL at 08:48

## 2022-05-12 RX ADMIN — ENOXAPARIN SODIUM SCH MG: 40 INJECTION SUBCUTANEOUS at 08:53

## 2022-05-12 RX ADMIN — LOSARTAN POTASSIUM SCH MG: 50 TABLET, FILM COATED ORAL at 08:48

## 2022-05-12 NOTE — NUR
RN NOTES



DR. COHEN ORDERED FOR SMALL BOWEL FOLLOW THROUGH. PATIENT HAS DISTENDED ABDOMEN AND VERY 
FIRM. NO TENDERNESS. NO BM.

PER XRAY THERES NO REASON TO DO SMALL BOWEL FOLLOW THROUGH DONE 2 DAYS. 

PER XRAY, IF DR. COHEN COULD TALK TO THE RADIOLOGIST. PROVIDED XRAYS NUMBER.

PER DR. COHEN, WILL GIVE THEM A CALL. General Abdominal pain

## 2022-05-12 NOTE — NUR
RN NOTES



PER DR. COHEN, CANCEL SMALL BOWEL FOLLOWTHROUGH. WILL DO KUB IN AM INSTEAD.

KEEP ON CLEAR LIQUID DIET

## 2022-05-12 NOTE — NUR
RN NOTES



SALIMA NP FOR DR AUGUST PONCE AT BEDSIDE, INFORMED HER THAT DR. COHEN WANTED TO DO A 
REPEAT SMALL BOWEL FOLLOW THROUGH, BUT ACCORDING TO HER JUST DO A REPEAT KUB IN AM.

## 2022-05-12 NOTE — NUR
RN NOTE



RECEIVED PATIENT IN BED, AWAKE, ALERT, AND VERBALLY RESPONSIVE. ABLE TO MAKE NEEDS KNOWN. 
BREATHING EVEN AND UNLABORED. ON ROOM AIR. TOLERATING WELL. DENIES SOB. NOTED WITH 
PRODUCTIVE COUGH. NOT ON TELE MONITORING. DENIES CHEST PAIN. SKIN IS WARM AND DRY. LEFT 
WRIST 20G PERIPHERAL IV. NO IVF. PATIENT NOT COMPLAINING OF ANY ABDOMINAL ISSUE AT THIS 
TIME. NOTED PATIENT WITH FIRM ABDOMEN. NON-TENDER. PATIENT STATES HE HAD ONE BM IN 
AFTERNOON. INDWELLING BALL CATHETER INTACT, DRAINING YELLOW URINE. DENIES PAIN AT THIS 
TIME. ALL BELONGINGS WITHIN REACH. BED LOW, IN LOCKED POSITION, CALL LIGHT WITHIN REACH.

## 2022-05-12 NOTE — NUR
MS RN AM NOTE



PATIENT IN BED, AOX4, ABLE TO MAKE NEEDS KNOWN. DENIES SOB, ON ROOM AIR, O2 SAT AT 95%, 
RESPIRATION UNLABORED, OCCASIONAL PRODUCTIVE COUGH. HOB ELEVATED SEMI SR. DENIES CHEST 
PAINS OR ANY DISCOMFORT. SKIN WARM AND DRY. LEFT WRIST 20G PERIPHERAL IV FLUSHES WELL, SITE 
CLEAR. ABDOMEN, VERY FIRM AND DISTENDED, DENIES ABDOMINAL PAIN. HYPOACTIVE BOWEL SOUND, NO 
NAUSEA/VOMITING. INDWELLING BALL CATHETER INTACT, NO BLEEDING, DRAINING BY GRAVITY. BED 
LOW, IN LOCKED POSITION, CALL LIGHT WITHIN REACH. ALL BELONGINGS WITHIN REACH. POC 
DISCUSSED. VERBALIZED UNDERSTANDING. WILL CONTINUE TO MONITOR.

## 2022-05-12 NOTE — NUR
MS RN CLOSING NOTE



PATIENT IN BED, AOX4, RESTING COMFORTABLY. ABLE TO MAKE NEEDS KNOWN. DENIES SOB, ON ROOM 
AIR, O2 SAT AT 95%, RESPIRATION UNLABORED, OCCASIONAL PRODUCTIVE COUGH. HOB ELEVATED SEMI 
SR. DENIES CHEST PAINS OR ANY DISCOMFORT. SKIN WARM AND DRY. LEFT WRIST 20G PERIPHERAL 
IV FLUSHES WELL, SITE CLEAR. ABDOMEN, SLIGHTLY DISTENDED, DENIES ABDOMINAL PAIN. HYPOACTIVE 
BOWEL SOUND, NO NAUSEA/VOMITING. INDWELLING BALL CATHETER INTACT, WITH 2125 ML OUTPUT. 
DRAINING BY GRAVITY. BED LOW, IN LOCKED POSITION, CALL LIGHT WITHIN REACH. ALL BELONGINGS 
WITHIN REACH. ALL NEEDS MET AT THIS TIME. PM CARE DONE EARLIER. WILL ENDORSE TO NEXT SHIFT 
FOR MARKO.



FOR KUB ABDOMEN IN AM.

## 2022-05-13 VITALS — SYSTOLIC BLOOD PRESSURE: 150 MMHG | DIASTOLIC BLOOD PRESSURE: 64 MMHG

## 2022-05-13 VITALS — DIASTOLIC BLOOD PRESSURE: 80 MMHG | SYSTOLIC BLOOD PRESSURE: 158 MMHG

## 2022-05-13 VITALS — SYSTOLIC BLOOD PRESSURE: 146 MMHG | DIASTOLIC BLOOD PRESSURE: 77 MMHG

## 2022-05-13 LAB
BASOPHILS # BLD AUTO: 0 K/UL (ref 0–0.2)
BASOPHILS NFR BLD AUTO: 0.1 % (ref 0–2)
BUN SERPL-MCNC: 25 MG/DL (ref 7–18)
CALCIUM SERPL-MCNC: 7.9 MG/DL (ref 8.5–10.1)
CHLORIDE SERPL-SCNC: 100 MMOL/L (ref 98–107)
CO2 SERPL-SCNC: 31 MMOL/L (ref 21–32)
CREAT SERPL-MCNC: 0.9 MG/DL (ref 0.6–1.3)
EOSINOPHIL NFR BLD AUTO: 1.5 % (ref 0–6)
GLUCOSE SERPL-MCNC: 112 MG/DL (ref 74–106)
HCT VFR BLD AUTO: 33 % (ref 39–51)
HGB BLD-MCNC: 11.5 G/DL (ref 13.5–17.5)
LYMPHOCYTES NFR BLD AUTO: 0.8 K/UL (ref 0.8–4.8)
LYMPHOCYTES NFR BLD AUTO: 9.2 % (ref 20–44)
MCHC RBC AUTO-ENTMCNC: 34 G/DL (ref 31–36)
MCV RBC AUTO: 91 FL (ref 80–96)
MONOCYTES NFR BLD AUTO: 1.3 K/UL (ref 0.1–1.3)
MONOCYTES NFR BLD AUTO: 14.3 % (ref 2–12)
NEUTROPHILS # BLD AUTO: 6.7 K/UL (ref 1.8–8.9)
NEUTROPHILS NFR BLD AUTO: 74.9 % (ref 43–81)
PLATELET # BLD AUTO: 303 K/UL (ref 150–450)
POTASSIUM SERPL-SCNC: 3.4 MMOL/L (ref 3.5–5.1)
RBC # BLD AUTO: 3.68 MIL/UL (ref 4.5–6)
SODIUM SERPL-SCNC: 138 MMOL/L (ref 136–145)
WBC NRBC COR # BLD AUTO: 9 K/UL (ref 4.3–11)

## 2022-05-13 RX ADMIN — Medication SCH MG: at 09:22

## 2022-05-13 RX ADMIN — ENOXAPARIN SODIUM SCH MG: 40 INJECTION SUBCUTANEOUS at 09:24

## 2022-05-13 RX ADMIN — POLYETHYLENE GLYCOL 3350 SCH GM: 17 POWDER, FOR SOLUTION ORAL at 09:21

## 2022-05-13 RX ADMIN — FUROSEMIDE SCH MG: 10 INJECTION INTRAVENOUS at 09:22

## 2022-05-13 RX ADMIN — ASPIRIN 81 MG SCH MG: 81 TABLET ORAL at 09:22

## 2022-05-13 RX ADMIN — DOCUSATE SODIUM SCH MG: 50 LIQUID ORAL at 09:21

## 2022-05-13 RX ADMIN — LOSARTAN POTASSIUM SCH MG: 50 TABLET, FILM COATED ORAL at 09:22

## 2022-05-13 RX ADMIN — DOCUSATE SODIUM SCH MG: 50 LIQUID ORAL at 16:47

## 2022-05-13 NOTE — NUR
RN NOTES



PT DISCHARGED TO HOME TODAY PER MD STABLE CONDITION AND CLEARED BY SURGERY. PROVIDED WITH DC 
INSTRUCTIONS, HEALTH TEACHINGS, AND TO CONTINUE HOME MEDS. PATIENT TO FOLLOW UP WITH PP IN 
1- 2 WEEKS AND WILL MAKE OWN APPOINTMENT. PATIENT ABLE TO TOLERATED FULL LIQUID FOR LUNCH 
EARLIER. SKIN INTACT. AMBULATE WITH ASSIST. IV ACCESS ON LEFT WRIST REMOVED, CATH TIP 
COMPLETE, PRESSURE AND DRESSING APPLIED, NO BLEEDING. ALL PAPERWORKS SIGNED. ALL BELONGINGS 
CHECKED AND RETURNED. ACCOMPANIED TO LOBBY VIA WHEELCHAIR AND WITH FACEMASK ON. PT PICKED UP 
BY WIFE VIA PRIVATE CAR TO TRANSPORT TO HOME.

## 2022-05-13 NOTE — NUR
RN NOTE



NO SIGNIFICANT CHANGES. PATIENT DID NOT COMPLAIN OF ABDOMINAL DISCOMFORT. PATIENT WAS 
ASSISTED TO BEDSIDE CHAIR PER REQUEST. WATCHED TELEVISION. ALL NEEDS ATTENDED. ASSISTED WITH 
HYGIENE CARE. INDWELLING BALL CATHETER CLEANED. INTACT. DRAINING YELLOW URINE. ALL 
BELONGINGS WITHIN REACH. BED LOW, IN LOCKED POSITION, CALL LIGHT WITHIN REACH.

## 2022-05-27 ENCOUNTER — TELEPHONE (OUTPATIENT)
Dept: FAMILY MEDICINE CLINIC | Age: 77
End: 2022-05-27

## 2022-05-27 NOTE — TELEPHONE ENCOUNTER
----- Message from Lois Werner sent at 5/27/2022  3:23 PM EDT -----  Subject: Appointment Request    Reason for Call: Routine Hospital Follow Up    QUESTIONS  Type of Appointment? Established Patient  Reason for appointment request? No appointments available during search  Additional Information for Provider? d/c from [facility] in [place], [place];   for low sodium and staff infection; pt wishes for an appt 6/7-10/22;   please call for another blood culture and an URGENT appt   ---------------------------------------------------------------------------  --------------  [employer]  What is the best way for the office to contact you? OK to leave message on   voicemail  Preferred Call Back Phone Number? [phone]  ---------------------------------------------------------------------------  --------------  SCRIPT ANSWERS  Relationship to Patient? Self  (Patient requests to see provider urgently. )? No  (Has the patient been discharged from the hospital within 2 business days   AND does not have a Telephone Encounter  Follow Up From [address] [place] [address]   documented in [address]?)? No  Do you have any questions for your primary care provider that need to be   answered prior to your appointment? (Use RN Triage if question pertains to   anything on the red flag list)? No  (Patient needs follow up visit after hospital discharge) Book first   available appointment within 7 days OF DISCHARGE, if no appt, proceed to   book the next available time slot within 14 days OF DISCHARGE AND Send   Message to Provider. [facility] Follow Up appointment cannot be booked   beyond 14 Days and should result in a Message to Provider. ? Yes   Have you been diagnosed with, awaiting test results for, or told that you   are suspected of having COVID-19 (Coronavirus)? (If patient has tested   negative or was tested as a requirement for work, school, or travel and   not based on symptoms, answer no)?  No  Within the past 10 days have you developed any of the following symptoms   (answer no if symptoms have been present longer than 10 days or began   more than 10 days ago)? Fever or Chills, Cough, Shortness of breath or   difficulty breathing, Loss of taste or smell, Sore throat, Nasal   congestion, Sneezing or runny nose, Fatigue or generalized body aches   (answer no if pain is specific to a body part e.g. back pain), Diarrhea,   Headache? No  Have you had close contact with someone with COVID-19 in the last 7 days? No  (Service Expert  click yes below to proceed with [place] [employer]ual   Scheduling)?  Yes

## 2022-06-06 ENCOUNTER — OFFICE VISIT (OUTPATIENT)
Dept: FAMILY MEDICINE CLINIC | Age: 77
End: 2022-06-06
Payer: MEDICARE

## 2022-06-06 VITALS
DIASTOLIC BLOOD PRESSURE: 66 MMHG | RESPIRATION RATE: 20 BRPM | OXYGEN SATURATION: 98 % | HEIGHT: 64 IN | BODY MASS INDEX: 28.17 KG/M2 | TEMPERATURE: 98.1 F | HEART RATE: 95 BPM | SYSTOLIC BLOOD PRESSURE: 103 MMHG | WEIGHT: 165 LBS

## 2022-06-06 DIAGNOSIS — E22.2 SIADH (SYNDROME OF INAPPROPRIATE ADH PRODUCTION) (HCC): ICD-10-CM

## 2022-06-06 DIAGNOSIS — E87.1 HYPONATREMIA: Primary | ICD-10-CM

## 2022-06-06 DIAGNOSIS — L03.114 CELLULITIS OF LEFT UPPER EXTREMITY: ICD-10-CM

## 2022-06-06 DIAGNOSIS — U07.1 COVID-19: ICD-10-CM

## 2022-06-06 DIAGNOSIS — I10 ESSENTIAL HYPERTENSION, BENIGN: ICD-10-CM

## 2022-06-06 LAB
ALBUMIN SERPL-MCNC: 3.5 G/DL (ref 3.5–5)
ALBUMIN/GLOB SERPL: 1.2 {RATIO} (ref 1.1–2.2)
ALP SERPL-CCNC: 56 U/L (ref 45–117)
ALT SERPL-CCNC: 21 U/L (ref 12–78)
ANION GAP SERPL CALC-SCNC: 6 MMOL/L (ref 5–15)
AST SERPL-CCNC: 20 U/L (ref 15–37)
BASOPHILS # BLD: 0.1 K/UL (ref 0–0.1)
BASOPHILS NFR BLD: 1 % (ref 0–1)
BILIRUB SERPL-MCNC: 0.5 MG/DL (ref 0.2–1)
BUN SERPL-MCNC: 18 MG/DL (ref 6–20)
BUN/CREAT SERPL: 17 (ref 12–20)
CALCIUM SERPL-MCNC: 9.2 MG/DL (ref 8.5–10.1)
CHLORIDE SERPL-SCNC: 103 MMOL/L (ref 97–108)
CO2 SERPL-SCNC: 25 MMOL/L (ref 21–32)
CREAT SERPL-MCNC: 1.08 MG/DL (ref 0.7–1.3)
DIFFERENTIAL METHOD BLD: ABNORMAL
EOSINOPHIL # BLD: 0.3 K/UL (ref 0–0.4)
EOSINOPHIL NFR BLD: 4 % (ref 0–7)
ERYTHROCYTE [DISTWIDTH] IN BLOOD BY AUTOMATED COUNT: 14.1 % (ref 11.5–14.5)
GLOBULIN SER CALC-MCNC: 3 G/DL (ref 2–4)
GLUCOSE SERPL-MCNC: 99 MG/DL (ref 65–100)
HCT VFR BLD AUTO: 38.7 % (ref 36.6–50.3)
HGB BLD-MCNC: 12.5 G/DL (ref 12.1–17)
IMM GRANULOCYTES # BLD AUTO: 0 K/UL (ref 0–0.04)
IMM GRANULOCYTES NFR BLD AUTO: 1 % (ref 0–0.5)
LYMPHOCYTES # BLD: 2.3 K/UL (ref 0.8–3.5)
LYMPHOCYTES NFR BLD: 31 % (ref 12–49)
MCH RBC QN AUTO: 31.3 PG (ref 26–34)
MCHC RBC AUTO-ENTMCNC: 32.3 G/DL (ref 30–36.5)
MCV RBC AUTO: 96.8 FL (ref 80–99)
MONOCYTES # BLD: 0.9 K/UL (ref 0–1)
MONOCYTES NFR BLD: 13 % (ref 5–13)
NEUTS SEG # BLD: 3.8 K/UL (ref 1.8–8)
NEUTS SEG NFR BLD: 50 % (ref 32–75)
NRBC # BLD: 0 K/UL (ref 0–0.01)
NRBC BLD-RTO: 0 PER 100 WBC
PLATELET # BLD AUTO: 277 K/UL (ref 150–400)
PMV BLD AUTO: 9.8 FL (ref 8.9–12.9)
POTASSIUM SERPL-SCNC: 4.4 MMOL/L (ref 3.5–5.1)
PROT SERPL-MCNC: 6.5 G/DL (ref 6.4–8.2)
RBC # BLD AUTO: 4 M/UL (ref 4.1–5.7)
SODIUM SERPL-SCNC: 134 MMOL/L (ref 136–145)
WBC # BLD AUTO: 7.4 K/UL (ref 4.1–11.1)

## 2022-06-06 PROCEDURE — G8510 SCR DEP NEG, NO PLAN REQD: HCPCS | Performed by: FAMILY MEDICINE

## 2022-06-06 PROCEDURE — G8427 DOCREV CUR MEDS BY ELIG CLIN: HCPCS | Performed by: FAMILY MEDICINE

## 2022-06-06 PROCEDURE — 1123F ACP DISCUSS/DSCN MKR DOCD: CPT | Performed by: FAMILY MEDICINE

## 2022-06-06 PROCEDURE — G0463 HOSPITAL OUTPT CLINIC VISIT: HCPCS | Performed by: FAMILY MEDICINE

## 2022-06-06 PROCEDURE — G8417 CALC BMI ABV UP PARAM F/U: HCPCS | Performed by: FAMILY MEDICINE

## 2022-06-06 PROCEDURE — 99214 OFFICE O/P EST MOD 30 MIN: CPT | Performed by: FAMILY MEDICINE

## 2022-06-06 PROCEDURE — G8536 NO DOC ELDER MAL SCRN: HCPCS | Performed by: FAMILY MEDICINE

## 2022-06-06 PROCEDURE — 1101F PT FALLS ASSESS-DOCD LE1/YR: CPT | Performed by: FAMILY MEDICINE

## 2022-06-06 PROCEDURE — G8754 DIAS BP LESS 90: HCPCS | Performed by: FAMILY MEDICINE

## 2022-06-06 PROCEDURE — G8752 SYS BP LESS 140: HCPCS | Performed by: FAMILY MEDICINE

## 2022-06-06 RX ORDER — AMLODIPINE BESYLATE 5 MG/1
5 TABLET ORAL DAILY
COMMUNITY
End: 2022-06-06

## 2022-06-06 NOTE — PROGRESS NOTES
Patient here for hospital f/u. He was in [address] [place], [place] and had Covid, had antiviral.( Paxlovid) A week later, had different sx, admitted again with low sodium. Then he developed a sore on left wrist. Went to two different hospitals for \" bacteria in the blood \" 5/14-5/18/22 had  Iv antibiotics. Then had 12 doses of IV Rocephin at home with Home health. ID doctors wants him to have blood cultures drawn in second week of June. During hospitalization , he was started on amlodipine 5 mg daily to medication regimen. He would need a refill if Dr. Rebecca Roman would like to keep him on it. Simvastatin held x 15 days due to reaction with Paxlovid. He has not started back on this yet. Pharmacist states being on Amlodipine and simvastatin reaction together. [facility] nurse told patient he needed to come back to see Dr. Rebecca Roman to get CBC abd CMP including a sodium level. BP was elevated in the hospital. He also needs a nephrologist consult. 1. Have you been to the ER, urgent care clinic since your last visit? Hospitalized since your last visit? Yes When: 5/14/22-5/18/22, [place]    2. Have you seen or consulted any other health care providers outside of the [address] [place] [address] since your last visit? Include any pap smears or colon screening. No            Chief Complaint   Patient presents with   [facility] Follow Up     low na, blood ionfection, covid     He is a 76 y.o. male who presents for evalution. Reviewed PmHx, RxHx, FmHx, SocHx, AllgHx and updated and dated in the chart. Patient Active Problem List    Diagnosis    SIADH [facility]syndrome of inappropriate ADH production) [facility]    Prostate cancer ([facility]    S/P left inguinal herniorrhaphy     With mesh.  Bilateral groin pain    Elevated blood sugar    S/P bilateral inguinal hernia repair     With mesh.  Obesity (BMI 30.0-34. 9)    Advanced care planning/counseling discussion     Has LW      Osteoporosis    Pre-diabetes    Hypogonadism male    Vitamin D deficiency  Hyponatremia    OCD (obsessive compulsive disorder)    Anxiety state, unspecified    Mixed hyperlipidemia    Essential hypertension, benign       Review of Systems - negative except as listed above in the HPI    Objective:     Vitals:    06/06/22 0828   BP: 103/66   Pulse: 95   Resp: 20   Temp: 98.1 °F (36.7 °C)   SpO2: 98%   Weight: 165 lb (74.8 kg)   Height: 5' 4\" (1.626 m)         Assessment/ Plan:   Diagnoses and all orders for this visit:    1. Hyponatremia  -     METABOLIC PANEL, COMPREHENSIVE; Future  Patient was admitted to the hospital [place] for [facility] and subsequently developed hyponatremia and cellulitic infection of the left arm from IV. He was treated with IV antibiotics in [place] with home health and is now back in [place] doing much better. Paperwork scanned into the chart. There is suggestion the patient have a repeat blood culture which I told the patient was not necessary secondary to his symptoms but he is insistent on having the labs done. 2. Essential hypertension, benign  -     METABOLIC PANEL, COMPREHENSIVE; Future  At goal, DC amlodipine secondary to low blood pressure  3. COVID-19  Resolved  4. Cellulitis of left upper extremity  -     CBC WITH AUTOMATED DIFF; Future  -     CULTURE, BLOOD; Future  Resolved  5. SIADH (syndrome of inappropriate ADH production) ([facility]  Discussed with patient fluid restriction       Follow-up and Dispositions    · Return if symptoms worsen or fail to improve. I have discussed the diagnosis with the patient and the intended plan as seen in the above orders. The patient understands and agrees with the plan. The patient has received an after-visit summary and questions were answered concerning future plans. Medication Side Effects and Warnings were discussed with patient  Patient Labs were reviewed and or requested:  Patient Past Records were reviewed and or requested    John Lorenzana M.D.     There are no Patient Instructions on file for this visit.

## 2022-06-14 ENCOUNTER — TELEPHONE (OUTPATIENT)
Dept: FAMILY MEDICINE CLINIC | Age: 77
End: 2022-06-14

## 2022-06-14 NOTE — TELEPHONE ENCOUNTER
----- Message from [facility] sent at 6/13/2022  2:25 PM EDT -----  Subject: Results Request    QUESTIONS  Which lab or imaging result is the patient calling about? blood work  Which provider ordered the test? Starr Peralta   At what location was the test performed? [facility]   Date the test was performed? 2022-06-06  Additional Information for Provider? pt is requesting recent labs and   office notes and faxed to [phone]   immediately . if questions contact pt  ---------------------------------------------------------------------------  --------------  CALL BACK INFO  What is the best way for the office to contact you? OK to leave message on   voicemail  Preferred Call Back Phone Number? [phone]  ---------------------------------------------------------------------------  --------------  SCRIPT ANSWERS  Relationship to Patient?  Self

## 2022-06-21 LAB — BACTERIA BLD CULT: NORMAL

## 2022-08-17 ENCOUNTER — OFFICE VISIT (OUTPATIENT)
Dept: FAMILY MEDICINE CLINIC | Age: 77
End: 2022-08-17
Payer: MEDICARE

## 2022-08-17 VITALS
BODY MASS INDEX: 29.16 KG/M2 | WEIGHT: 175 LBS | SYSTOLIC BLOOD PRESSURE: 122 MMHG | OXYGEN SATURATION: 98 % | RESPIRATION RATE: 16 BRPM | TEMPERATURE: 98.3 F | HEART RATE: 78 BPM | DIASTOLIC BLOOD PRESSURE: 65 MMHG | HEIGHT: 65 IN

## 2022-08-17 DIAGNOSIS — E22.2 SIADH (SYNDROME OF INAPPROPRIATE ADH PRODUCTION) (HCC): ICD-10-CM

## 2022-08-17 DIAGNOSIS — C61 PROSTATE CANCER (HCC): ICD-10-CM

## 2022-08-17 DIAGNOSIS — I10 ESSENTIAL HYPERTENSION, BENIGN: ICD-10-CM

## 2022-08-17 DIAGNOSIS — R73.03 PRE-DIABETES: ICD-10-CM

## 2022-08-17 DIAGNOSIS — R73.9 ELEVATED BLOOD SUGAR: ICD-10-CM

## 2022-08-17 DIAGNOSIS — E55.9 VITAMIN D DEFICIENCY: ICD-10-CM

## 2022-08-17 DIAGNOSIS — Z00.01 ENCOUNTER FOR ROUTINE ADULT HEALTH EXAMINATION WITH ABNORMAL FINDINGS: Primary | ICD-10-CM

## 2022-08-17 DIAGNOSIS — E78.2 MIXED HYPERLIPIDEMIA: ICD-10-CM

## 2022-08-17 DIAGNOSIS — E87.1 HYPONATREMIA: ICD-10-CM

## 2022-08-17 LAB — HCYS SERPL-SCNC: 12.2 UMOL/L (ref 3.7–13.9)

## 2022-08-17 PROCEDURE — G8510 SCR DEP NEG, NO PLAN REQD: HCPCS | Performed by: FAMILY MEDICINE

## 2022-08-17 PROCEDURE — G8752 SYS BP LESS 140: HCPCS | Performed by: FAMILY MEDICINE

## 2022-08-17 PROCEDURE — G0439 PPPS, SUBSEQ VISIT: HCPCS | Performed by: FAMILY MEDICINE

## 2022-08-17 PROCEDURE — G8417 CALC BMI ABV UP PARAM F/U: HCPCS | Performed by: FAMILY MEDICINE

## 2022-08-17 PROCEDURE — 1123F ACP DISCUSS/DSCN MKR DOCD: CPT | Performed by: FAMILY MEDICINE

## 2022-08-17 PROCEDURE — G8536 NO DOC ELDER MAL SCRN: HCPCS | Performed by: FAMILY MEDICINE

## 2022-08-17 PROCEDURE — 99214 OFFICE O/P EST MOD 30 MIN: CPT | Performed by: FAMILY MEDICINE

## 2022-08-17 PROCEDURE — G8427 DOCREV CUR MEDS BY ELIG CLIN: HCPCS | Performed by: FAMILY MEDICINE

## 2022-08-17 PROCEDURE — G8754 DIAS BP LESS 90: HCPCS | Performed by: FAMILY MEDICINE

## 2022-08-17 PROCEDURE — G0463 HOSPITAL OUTPT CLINIC VISIT: HCPCS | Performed by: FAMILY MEDICINE

## 2022-08-17 PROCEDURE — 1101F PT FALLS ASSESS-DOCD LE1/YR: CPT | Performed by: FAMILY MEDICINE

## 2022-08-17 NOTE — PROGRESS NOTES
Chief Complaint   Patient presents with    Labs     Hyponatremia: Fasting today    Hypertension    Annual Wellness Visit     1. Have you been to the ER, urgent care clinic since your last visit? Hospitalized since your last visit? Yes Where: [facility]  in [facility]    2. Have you seen or consulted any other health care providers outside of the [address] [place] [address] since your last visit? Include any pap smears or colon screening. No          Medicare Wellness Exam:    Chief Complaint   Patient presents with    Labs     Hyponatremia: Fasting today    Hypertension    Annual Wellness Visit     he is a 76y.o. year old male who presents for evaluation for their Medicare Wellness Visit. Yoanna Xavier is completed and assessed=yes  Depression Screen is completed and assessed=yes  Medication list reviewed and adjusted for accuracy=yes  Immunizations reviewed and updated=yes  Health/Preventative Screenings reviewed and updated=yes  ADL Functions reviewed=yes    Patient Active Problem List    Diagnosis    SIADH (syndrome of inappropriate ADH production) ([facility] [address]    Prostate cancer ([facility] [address]    S/P left inguinal herniorrhaphy     With mesh. Bilateral groin pain    Elevated blood sugar    S/P bilateral inguinal hernia repair     With mesh. Obesity (BMI 30.0-34. 9)    Advanced care planning/counseling discussion     Has LW      Osteoporosis    Pre-diabetes    Hypogonadism male    Vitamin D deficiency    Hyponatremia    OCD (obsessive compulsive disorder)    Anxiety state, unspecified    Mixed hyperlipidemia    Essential hypertension, benign       Reviewed PmHx, RxHx, FmHx, SocHx, AllgHx and updated and dated in the chart.     Review of Systems - negative except as listed above in the HPI    Objective:     Vitals:    08/17/22 0806   BP: 122/65   Pulse: 78   Resp: 16   Temp: 98.3 °F (36.8 °C)   TempSrc: Oral   SpO2: 98%   Weight: 175 lb (79.4 kg)   Height: 5' 4.5\" (1.638 m)       Assessment/ Plan: Diagnoses and all orders for this visit:    1. Encounter for routine adult health examination with abnormal findings  -     CRP, HIGH SENSITIVITY; Future  -     HOMOCYSTEINE, PLASMA; Future  -     LIPID PANEL; Future  -     METABOLIC PANEL, COMPREHENSIVE; Future  -     CBC WITH AUTOMATED DIFF; Future  -     TSH 3RD GENERATION; Future  -has lw    2. SIADH (syndrome of inappropriate ADH production) (HCC)  -     METABOLIC PANEL, COMPREHENSIVE; Future  -has appt with nephro    3. Prostate cancer ([facility] [address]  -PSA at urology    4. Vitamin D deficiency  -     VITAMIN D, 25 HYDROXY; Future    5. Elevated blood sugar  -     METABOLIC PANEL, COMPREHENSIVE; Future  -     HEMOGLOBIN A1C WITH EAG; Future    6. Pre-diabetes  -     METABOLIC PANEL, COMPREHENSIVE; Future  -     HEMOGLOBIN A1C WITH EAG; Future    7. Hyponatremia  -     METABOLIC PANEL, COMPREHENSIVE; Future    8. Essential hypertension, benign  -     LIPID PANEL; Future  -     METABOLIC PANEL, COMPREHENSIVE; Future  -at goal    9. Mixed hyperlipidemia  -     LIPID PANEL; Future  -     METABOLIC PANEL, COMPREHENSIVE;  Future         -Pain evaluation performed in office  -Cognitive Screen performed in office  -Depression Screen, Fall risks (by up and go test)  and ADL functionality were addressed  -Medication list updated and reviewed for any changes   -A comprehensive review of medical issues and a plan was formulated  -End of life planning was addressed with pt   -Health Screenings for preventions were addressed and a plan was formulated  -Shingles Vaccine was recommended  -Discussed with patient cancer risk factors and appropriate screenings for age  -Patient evaluated for colonoscopy and referred if needed per screeing criteria  -Labs from previous visits were discussed with patient   -Discussed with patient diet and exercise and formulated a plan as needed  -An Advanced care plan was developed with the patient.  -Alcohol screening performed and was negative    -    I have discussed the diagnosis with the patient and the intended plan as seen in the above orders. The patient understands and agrees with the plan. The patient has received an after-visit summary and questions were answered concerning future plans. Medication Side Effects and Warnings were discussed with patien  Patient Labs were reviewed and or requested  Patient Past Records were reviewed and or requested    There are no Patient Instructions on file for this visit.       Jose Lovelace M.D.

## 2022-08-17 NOTE — PROGRESS NOTES
Chief Complaint   Patient presents with    Labs     Hyponatremia: Fasting today    Hypertension    Annual Wellness Visit     1. Have you been to the ER, urgent care clinic since your last visit? Hospitalized since your last visit? Yes Where: [facility]  in [facility]    2. Have you seen or consulted any other health care providers outside of the [address] [place] [address] since your last visit? Include any pap smears or colon screening.  No

## 2022-08-18 LAB
25(OH)D3 SERPL-MCNC: 84.4 NG/ML (ref 30–100)
ALBUMIN SERPL-MCNC: 3.7 G/DL (ref 3.5–5)
ALBUMIN/GLOB SERPL: 1.3 {RATIO} (ref 1.1–2.2)
ALP SERPL-CCNC: 48 U/L (ref 45–117)
ALT SERPL-CCNC: 28 U/L (ref 12–78)
ANION GAP SERPL CALC-SCNC: 5 MMOL/L (ref 5–15)
AST SERPL-CCNC: 27 U/L (ref 15–37)
BASOPHILS # BLD: 0.1 K/UL (ref 0–0.1)
BASOPHILS NFR BLD: 1 % (ref 0–1)
BILIRUB SERPL-MCNC: 0.6 MG/DL (ref 0.2–1)
BUN SERPL-MCNC: 25 MG/DL (ref 6–20)
BUN/CREAT SERPL: 21 (ref 12–20)
CALCIUM SERPL-MCNC: 9 MG/DL (ref 8.5–10.1)
CHLORIDE SERPL-SCNC: 104 MMOL/L (ref 97–108)
CHOLEST SERPL-MCNC: 156 MG/DL
CO2 SERPL-SCNC: 27 MMOL/L (ref 21–32)
CREAT SERPL-MCNC: 1.19 MG/DL (ref 0.7–1.3)
CRP SERPL HS-MCNC: 0.4 MG/L
DIFFERENTIAL METHOD BLD: NORMAL
EOSINOPHIL # BLD: 0.3 K/UL (ref 0–0.4)
EOSINOPHIL NFR BLD: 5 % (ref 0–7)
ERYTHROCYTE [DISTWIDTH] IN BLOOD BY AUTOMATED COUNT: 13.6 % (ref 11.5–14.5)
EST. AVERAGE GLUCOSE BLD GHB EST-MCNC: 108 MG/DL
GLOBULIN SER CALC-MCNC: 2.8 G/DL (ref 2–4)
GLUCOSE SERPL-MCNC: 94 MG/DL (ref 65–100)
HBA1C MFR BLD: 5.4 % (ref 4–5.6)
HCT VFR BLD AUTO: 41.3 % (ref 36.6–50.3)
HDLC SERPL-MCNC: 60 MG/DL
HDLC SERPL: 2.6 {RATIO} (ref 0–5)
HGB BLD-MCNC: 13.4 G/DL (ref 12.1–17)
IMM GRANULOCYTES # BLD AUTO: 0 K/UL (ref 0–0.04)
IMM GRANULOCYTES NFR BLD AUTO: 0 % (ref 0–0.5)
LDLC SERPL CALC-MCNC: 79.8 MG/DL (ref 0–100)
LYMPHOCYTES # BLD: 2.5 K/UL (ref 0.8–3.5)
LYMPHOCYTES NFR BLD: 38 % (ref 12–49)
MCH RBC QN AUTO: 30.5 PG (ref 26–34)
MCHC RBC AUTO-ENTMCNC: 32.4 G/DL (ref 30–36.5)
MCV RBC AUTO: 94.1 FL (ref 80–99)
MONOCYTES # BLD: 0.8 K/UL (ref 0–1)
MONOCYTES NFR BLD: 13 % (ref 5–13)
NEUTS SEG # BLD: 2.8 K/UL (ref 1.8–8)
NEUTS SEG NFR BLD: 43 % (ref 32–75)
NRBC # BLD: 0 K/UL (ref 0–0.01)
NRBC BLD-RTO: 0 PER 100 WBC
PLATELET # BLD AUTO: 219 K/UL (ref 150–400)
PMV BLD AUTO: 10.2 FL (ref 8.9–12.9)
POTASSIUM SERPL-SCNC: 4.4 MMOL/L (ref 3.5–5.1)
PROT SERPL-MCNC: 6.5 G/DL (ref 6.4–8.2)
RBC # BLD AUTO: 4.39 M/UL (ref 4.1–5.7)
SODIUM SERPL-SCNC: 136 MMOL/L (ref 136–145)
TRIGL SERPL-MCNC: 81 MG/DL (ref ?–150)
TSH SERPL DL<=0.05 MIU/L-ACNC: 2.04 UIU/ML (ref 0.36–3.74)
VLDLC SERPL CALC-MCNC: 16.2 MG/DL
WBC # BLD AUTO: 6.4 K/UL (ref 4.1–11.1)

## 2023-01-04 ENCOUNTER — OFFICE VISIT (OUTPATIENT)
Dept: FAMILY MEDICINE CLINIC | Age: 78
End: 2023-01-04
Payer: MEDICARE

## 2023-01-04 VITALS
DIASTOLIC BLOOD PRESSURE: 72 MMHG | HEART RATE: 70 BPM | RESPIRATION RATE: 16 BRPM | OXYGEN SATURATION: 97 % | WEIGHT: 182 LBS | SYSTOLIC BLOOD PRESSURE: 122 MMHG | BODY MASS INDEX: 30.32 KG/M2 | HEIGHT: 65 IN | TEMPERATURE: 98.5 F

## 2023-01-04 DIAGNOSIS — C61 PROSTATE CANCER (HCC): ICD-10-CM

## 2023-01-04 DIAGNOSIS — I10 ESSENTIAL HYPERTENSION, BENIGN: Primary | ICD-10-CM

## 2023-01-04 DIAGNOSIS — E22.2 SIADH (SYNDROME OF INAPPROPRIATE ADH PRODUCTION) (HCC): ICD-10-CM

## 2023-01-04 DIAGNOSIS — H61.20 IMPACTED CERUMEN, UNSPECIFIED LATERALITY: ICD-10-CM

## 2023-01-04 PROCEDURE — G8510 SCR DEP NEG, NO PLAN REQD: HCPCS | Performed by: FAMILY MEDICINE

## 2023-01-04 PROCEDURE — G0463 HOSPITAL OUTPT CLINIC VISIT: HCPCS | Performed by: FAMILY MEDICINE

## 2023-01-04 PROCEDURE — 3078F DIAST BP <80 MM HG: CPT | Performed by: FAMILY MEDICINE

## 2023-01-04 PROCEDURE — G8427 DOCREV CUR MEDS BY ELIG CLIN: HCPCS | Performed by: FAMILY MEDICINE

## 2023-01-04 PROCEDURE — G8417 CALC BMI ABV UP PARAM F/U: HCPCS | Performed by: FAMILY MEDICINE

## 2023-01-04 PROCEDURE — G8536 NO DOC ELDER MAL SCRN: HCPCS | Performed by: FAMILY MEDICINE

## 2023-01-04 PROCEDURE — 1101F PT FALLS ASSESS-DOCD LE1/YR: CPT | Performed by: FAMILY MEDICINE

## 2023-01-04 PROCEDURE — 3074F SYST BP LT 130 MM HG: CPT | Performed by: FAMILY MEDICINE

## 2023-01-04 PROCEDURE — 1123F ACP DISCUSS/DSCN MKR DOCD: CPT | Performed by: FAMILY MEDICINE

## 2023-01-04 PROCEDURE — 99213 OFFICE O/P EST LOW 20 MIN: CPT | Performed by: FAMILY MEDICINE

## 2023-01-04 NOTE — PROGRESS NOTES
Chief Complaint   Patient presents with    Wax in Ear    Hypertension     1. Have you been to the ER, urgent care clinic since your last visit? Hospitalized since your last visit? Yes Where: [facility] ED     2. Have you seen or consulted any other health care providers outside of the [address] [place] [address] since your last visit? Include any pap smears or colon screening. No         Chief Complaint   Patient presents with    Wax in Ear    Hypertension     He is a 76 y.o. male who presents for evalution. Reviewed PmHx, RxHx, FmHx, SocHx, AllgHx and updated and dated in the chart. Patient Active Problem List    Diagnosis    SIADH (syndrome of inappropriate ADH production) ([facility] [address]    Prostate cancer ([facility] [address]    S/P left inguinal herniorrhaphy     With mesh. Bilateral groin pain    Elevated blood sugar    S/P bilateral inguinal hernia repair     With mesh. Obesity (BMI 30.0-34. 9)    Advanced care planning/counseling discussion     Has LW      Osteoporosis    Pre-diabetes    Hypogonadism male    Vitamin D deficiency    Hyponatremia    OCD (obsessive compulsive disorder)    Anxiety state, unspecified    Mixed hyperlipidemia    Essential hypertension, benign       Review of Systems - negative except as listed above in the HPI    Objective:     Vitals:    01/04/23 0738   BP: 122/72   Pulse: 70   Resp: 16   Temp: 98.5 °F (36.9 °C)   TempSrc: Oral   SpO2: 97%   Weight: 182 lb (82.6 kg)   Height: 5' 4.5\" (1.638 m)         Assessment/ Plan:   Diagnoses and all orders for this visit:    1. Essential hypertension, benign  -at goal    2. SIADH (syndrome of inappropriate ADH production) (HCC)  -stble and labs good    3. Prostate cancer ([facility] [address]  -stable    4. Impacted cerumen, unspecified laterality  -cleared           I have discussed the diagnosis with the patient and the intended plan as seen in the above orders. The patient understands and agrees with the plan.  The patient has received an after-visit summary and questions were answered concerning future plans. Medication Side Effects and Warnings were discussed with patient  Patient Labs were reviewed and or requested:  Patient Past Records were reviewed and or requested    Sola Srtoud M.D. There are no Patient Instructions on file for this visit.

## 2023-01-04 NOTE — PROGRESS NOTES
Chief Complaint   Patient presents with    Wax in Ear    Hypertension     1. Have you been to the ER, urgent care clinic since your last visit? Hospitalized since your last visit? Yes Where: [facility]     2. Have you seen or consulted any other health care providers outside of the [address] [place] [address] since your last visit? Include any pap smears or colon screening.  No

## 2023-03-06 ENCOUNTER — OFFICE VISIT (OUTPATIENT)
Dept: FAMILY MEDICINE CLINIC | Age: 78
End: 2023-03-06
Payer: MEDICARE

## 2023-03-06 VITALS
OXYGEN SATURATION: 98 % | RESPIRATION RATE: 14 BRPM | DIASTOLIC BLOOD PRESSURE: 67 MMHG | TEMPERATURE: 98.3 F | HEART RATE: 78 BPM | WEIGHT: 179 LBS | SYSTOLIC BLOOD PRESSURE: 120 MMHG | BODY MASS INDEX: 29.82 KG/M2 | HEIGHT: 65 IN

## 2023-03-06 DIAGNOSIS — M79.643 PAIN OF HAND, UNSPECIFIED LATERALITY: ICD-10-CM

## 2023-03-06 DIAGNOSIS — R73.03 PRE-DIABETES: ICD-10-CM

## 2023-03-06 DIAGNOSIS — E78.2 MIXED HYPERLIPIDEMIA: ICD-10-CM

## 2023-03-06 DIAGNOSIS — E87.1 HYPONATREMIA: ICD-10-CM

## 2023-03-06 DIAGNOSIS — I10 ESSENTIAL HYPERTENSION, BENIGN: Primary | ICD-10-CM

## 2023-03-06 PROCEDURE — 1101F PT FALLS ASSESS-DOCD LE1/YR: CPT | Performed by: FAMILY MEDICINE

## 2023-03-06 PROCEDURE — 99214 OFFICE O/P EST MOD 30 MIN: CPT | Performed by: FAMILY MEDICINE

## 2023-03-06 PROCEDURE — G8427 DOCREV CUR MEDS BY ELIG CLIN: HCPCS | Performed by: FAMILY MEDICINE

## 2023-03-06 PROCEDURE — 1123F ACP DISCUSS/DSCN MKR DOCD: CPT | Performed by: FAMILY MEDICINE

## 2023-03-06 PROCEDURE — 3078F DIAST BP <80 MM HG: CPT | Performed by: FAMILY MEDICINE

## 2023-03-06 PROCEDURE — G8510 SCR DEP NEG, NO PLAN REQD: HCPCS | Performed by: FAMILY MEDICINE

## 2023-03-06 PROCEDURE — G0463 HOSPITAL OUTPT CLINIC VISIT: HCPCS | Performed by: FAMILY MEDICINE

## 2023-03-06 PROCEDURE — G8536 NO DOC ELDER MAL SCRN: HCPCS | Performed by: FAMILY MEDICINE

## 2023-03-06 PROCEDURE — 3074F SYST BP LT 130 MM HG: CPT | Performed by: FAMILY MEDICINE

## 2023-03-06 PROCEDURE — G8417 CALC BMI ABV UP PARAM F/U: HCPCS | Performed by: FAMILY MEDICINE

## 2023-03-06 NOTE — PROGRESS NOTES
Chief Complaint   Patient presents with    Hypertension    Labs     Fasting today    Hand Pain     Arthritis     1. Have you been to the ER, urgent care clinic since your last visit? Hospitalized since your last visit? No    2. Have you seen or consulted any other health care providers outside of the [address] [place] [address] since your last visit? Include any pap smears or colon screening.  Yes Where: [facility] Hand [facility]

## 2023-03-06 NOTE — PROGRESS NOTES
Chief Complaint   Patient presents with    Hypertension    Labs     Fasting today    Hand Pain     Arthritis     1. Have you been to the ER, urgent care clinic since your last visit? Hospitalized since your last visit? No    2. Have you seen or consulted any other health care providers outside of the [address] [place] [address] since your last visit? Include any pap smears or colon screening. Yes Where: [facility] Hand [facility]           Chief Complaint   Patient presents with    Hypertension    Labs     Fasting today    Hand Pain     Arthritis     He is a [de-identified] y.o. male who presents for evalution. Reviewed PmHx, RxHx, FmHx, SocHx, AllgHx and updated and dated in the chart. Patient Active Problem List    Diagnosis    SIADH (syndrome of inappropriate ADH production) ([facility] [address]    Prostate cancer ([facility] [address]    S/P left inguinal herniorrhaphy     With mesh. Bilateral groin pain    Elevated blood sugar    S/P bilateral inguinal hernia repair     With mesh. Obesity (BMI 30.0-34. 9)    Advanced care planning/counseling discussion     Has LW      Osteoporosis    Pre-diabetes    Hypogonadism male    Vitamin D deficiency    Hyponatremia    OCD (obsessive compulsive disorder)    Anxiety state, unspecified    Mixed hyperlipidemia    Essential hypertension, benign       Review of Systems - negative except as listed above in the HPI    Objective:     Vitals:    03/06/23 0802   BP: 120/67   Pulse: 78   Resp: 14   Temp: 98.3 °F (36.8 °C)   TempSrc: Oral   SpO2: 98%   Weight: 179 lb (81.2 kg)   Height: 5' 4.5\" (1.638 m)         Assessment/ Plan:   Diagnoses and all orders for this visit:    1. Essential hypertension, benign  -     LIPID PANEL; Future  -     METABOLIC PANEL, COMPREHENSIVE; Future  -at goal    2. Pre-diabetes  -     HEMOGLOBIN A1C WITH EAG; Future  -     METABOLIC PANEL, COMPREHENSIVE; Future    3. Mixed hyperlipidemia  -     LIPID PANEL; Future  -     HEMOGLOBIN A1C WITH EAG; Future    4.  Hyponatremia  - METABOLIC PANEL, COMPREHENSIVE; Future    5. Pain of hand, unspecified laterality   -hold statin for one mon  and pt to let me know  -add nsaid if not better  -is wearing cts braces      Follow-up and Dispositions    Return in about 6 months (around 9/6/2023). I have discussed the diagnosis with the patient and the intended plan as seen in the above orders. The patient understands and agrees with the plan. The patient has received an after-visit summary and questions were answered concerning future plans. Medication Side Effects and Warnings were discussed with patient  Patient Labs were reviewed and or requested:  Patient Past Records were reviewed and or requested    Azam Smith M.D. There are no Patient Instructions on file for this visit.

## 2023-03-07 LAB
ALBUMIN SERPL-MCNC: 3.8 G/DL (ref 3.5–5)
ALBUMIN/GLOB SERPL: 1.4 (ref 1.1–2.2)
ALP SERPL-CCNC: 36 U/L (ref 45–117)
ALT SERPL-CCNC: 30 U/L (ref 12–78)
ANION GAP SERPL CALC-SCNC: 8 MMOL/L (ref 5–15)
AST SERPL-CCNC: 29 U/L (ref 15–37)
BILIRUB SERPL-MCNC: 0.6 MG/DL (ref 0.2–1)
BUN SERPL-MCNC: 24 MG/DL (ref 6–20)
BUN/CREAT SERPL: 20 (ref 12–20)
CALCIUM SERPL-MCNC: 9.5 MG/DL (ref 8.5–10.1)
CHLORIDE SERPL-SCNC: 102 MMOL/L (ref 97–108)
CHOLEST SERPL-MCNC: 152 MG/DL
CO2 SERPL-SCNC: 26 MMOL/L (ref 21–32)
CREAT SERPL-MCNC: 1.23 MG/DL (ref 0.7–1.3)
EST. AVERAGE GLUCOSE BLD GHB EST-MCNC: 114 MG/DL
GLOBULIN SER CALC-MCNC: 2.8 G/DL (ref 2–4)
GLUCOSE SERPL-MCNC: 95 MG/DL (ref 65–100)
HBA1C MFR BLD: 5.6 % (ref 4–5.6)
HDLC SERPL-MCNC: 51 MG/DL
HDLC SERPL: 3 (ref 0–5)
LDLC SERPL CALC-MCNC: 80.2 MG/DL (ref 0–100)
POTASSIUM SERPL-SCNC: 4.6 MMOL/L (ref 3.5–5.1)
PROT SERPL-MCNC: 6.6 G/DL (ref 6.4–8.2)
SODIUM SERPL-SCNC: 136 MMOL/L (ref 136–145)
TRIGL SERPL-MCNC: 104 MG/DL (ref ?–150)
VLDLC SERPL CALC-MCNC: 20.8 MG/DL

## 2023-03-22 DIAGNOSIS — I10 ESSENTIAL HYPERTENSION, BENIGN: ICD-10-CM

## 2023-03-22 RX ORDER — OLMESARTAN MEDOXOMIL 40 MG/1
TABLET ORAL
Qty: 90 TABLET | Refills: 3 | Status: SHIPPED | OUTPATIENT
Start: 2023-03-22

## 2023-07-06 ENCOUNTER — TELEPHONE (OUTPATIENT)
Age: 78
End: 2023-07-06

## 2023-07-06 RX ORDER — SIMVASTATIN 20 MG
20 TABLET ORAL NIGHTLY
Qty: 90 TABLET | Refills: 3 | Status: SHIPPED | OUTPATIENT
Start: 2023-07-06

## 2023-07-06 NOTE — TELEPHONE ENCOUNTER
[facility] needs a refill of Simvastatin. They have 4 pills/supply left and are requesting a 90 day supply with refills. Pharmacy has been updated in the chart. Patient was advised or scheduled an appointment for the future and to request refills thru the [employer] [facility] or by requesting a refill from their pharmacy in the future. Patient was also advised to check with their pharmacy for status of when refills are available.

## 2023-09-06 ENCOUNTER — OFFICE VISIT (OUTPATIENT)
Age: 78
End: 2023-09-06
Payer: MEDICARE

## 2023-09-06 VITALS
OXYGEN SATURATION: 98 % | RESPIRATION RATE: 16 BRPM | HEART RATE: 87 BPM | HEIGHT: 65 IN | TEMPERATURE: 97.7 F | BODY MASS INDEX: 29.66 KG/M2 | WEIGHT: 178 LBS | SYSTOLIC BLOOD PRESSURE: 130 MMHG | DIASTOLIC BLOOD PRESSURE: 70 MMHG

## 2023-09-06 DIAGNOSIS — E87.1 HYPONATREMIA: ICD-10-CM

## 2023-09-06 DIAGNOSIS — R73.9 ELEVATED BLOOD SUGAR: ICD-10-CM

## 2023-09-06 DIAGNOSIS — Z00.01 ENCOUNTER FOR MEDICARE ANNUAL EXAMINATION WITH ABNORMAL FINDINGS: Primary | ICD-10-CM

## 2023-09-06 DIAGNOSIS — C61 PROSTATE CANCER (HCC): ICD-10-CM

## 2023-09-06 DIAGNOSIS — I10 ESSENTIAL HYPERTENSION, BENIGN: ICD-10-CM

## 2023-09-06 DIAGNOSIS — E78.2 MIXED HYPERLIPIDEMIA: ICD-10-CM

## 2023-09-06 DIAGNOSIS — E22.2 SIADH (SYNDROME OF INAPPROPRIATE ADH PRODUCTION) (HCC): ICD-10-CM

## 2023-09-06 LAB
25(OH)D3 SERPL-MCNC: 77.9 NG/ML (ref 30–100)
ALBUMIN SERPL-MCNC: 4 G/DL (ref 3.5–5)
ALBUMIN/GLOB SERPL: 1.5 (ref 1.1–2.2)
ALP SERPL-CCNC: 41 U/L (ref 45–117)
ALT SERPL-CCNC: 27 U/L (ref 12–78)
ANION GAP SERPL CALC-SCNC: 5 MMOL/L (ref 5–15)
AST SERPL-CCNC: 24 U/L (ref 15–37)
BASOPHILS # BLD: 0.1 K/UL (ref 0–0.1)
BASOPHILS NFR BLD: 1 % (ref 0–1)
BILIRUB SERPL-MCNC: 0.7 MG/DL (ref 0.2–1)
BUN SERPL-MCNC: 23 MG/DL (ref 6–20)
BUN/CREAT SERPL: 18 (ref 12–20)
CALCIUM SERPL-MCNC: 9.3 MG/DL (ref 8.5–10.1)
CHLORIDE SERPL-SCNC: 103 MMOL/L (ref 97–108)
CHOLEST SERPL-MCNC: 142 MG/DL
CO2 SERPL-SCNC: 28 MMOL/L (ref 21–32)
CREAT SERPL-MCNC: 1.28 MG/DL (ref 0.7–1.3)
CRP SERPL-MCNC: <0.29 MG/DL (ref 0–0.6)
DIFFERENTIAL METHOD BLD: NORMAL
EOSINOPHIL # BLD: 0.3 K/UL (ref 0–0.4)
EOSINOPHIL NFR BLD: 4 % (ref 0–7)
ERYTHROCYTE [DISTWIDTH] IN BLOOD BY AUTOMATED COUNT: 13.5 % (ref 11.5–14.5)
EST. AVERAGE GLUCOSE BLD GHB EST-MCNC: 108 MG/DL
GLOBULIN SER CALC-MCNC: 2.6 G/DL (ref 2–4)
GLUCOSE SERPL-MCNC: 96 MG/DL (ref 65–100)
HBA1C MFR BLD: 5.4 % (ref 4–5.6)
HCT VFR BLD AUTO: 43.7 % (ref 36.6–50.3)
HCYS SERPL-SCNC: 13.7 UMOL/L (ref 3.7–13.9)
HDLC SERPL-MCNC: 46 MG/DL
HDLC SERPL: 3.1 (ref 0–5)
HGB BLD-MCNC: 14.1 G/DL (ref 12.1–17)
IMM GRANULOCYTES # BLD AUTO: 0 K/UL (ref 0–0.04)
IMM GRANULOCYTES NFR BLD AUTO: 0 % (ref 0–0.5)
LDLC SERPL CALC-MCNC: 77 MG/DL (ref 0–100)
LYMPHOCYTES # BLD: 1.8 K/UL (ref 0.8–3.5)
LYMPHOCYTES NFR BLD: 31 % (ref 12–49)
MCH RBC QN AUTO: 30.7 PG (ref 26–34)
MCHC RBC AUTO-ENTMCNC: 32.3 G/DL (ref 30–36.5)
MCV RBC AUTO: 95.2 FL (ref 80–99)
MONOCYTES # BLD: 0.7 K/UL (ref 0–1)
MONOCYTES NFR BLD: 12 % (ref 5–13)
NEUTS SEG # BLD: 3 K/UL (ref 1.8–8)
NEUTS SEG NFR BLD: 52 % (ref 32–75)
NRBC # BLD: 0 K/UL (ref 0–0.01)
NRBC BLD-RTO: 0 PER 100 WBC
PLATELET # BLD AUTO: 210 K/UL (ref 150–400)
PMV BLD AUTO: 10.5 FL (ref 8.9–12.9)
POTASSIUM SERPL-SCNC: 5.1 MMOL/L (ref 3.5–5.1)
PROT SERPL-MCNC: 6.6 G/DL (ref 6.4–8.2)
RBC # BLD AUTO: 4.59 M/UL (ref 4.1–5.7)
SODIUM SERPL-SCNC: 136 MMOL/L (ref 136–145)
TRIGL SERPL-MCNC: 95 MG/DL
VLDLC SERPL CALC-MCNC: 19 MG/DL
WBC # BLD AUTO: 5.9 K/UL (ref 4.1–11.1)

## 2023-09-06 PROCEDURE — 99214 OFFICE O/P EST MOD 30 MIN: CPT | Performed by: FAMILY MEDICINE

## 2023-09-06 PROCEDURE — 3075F SYST BP GE 130 - 139MM HG: CPT | Performed by: FAMILY MEDICINE

## 2023-09-06 PROCEDURE — 3078F DIAST BP <80 MM HG: CPT | Performed by: FAMILY MEDICINE

## 2023-09-06 PROCEDURE — 1123F ACP DISCUSS/DSCN MKR DOCD: CPT | Performed by: FAMILY MEDICINE

## 2023-09-06 PROCEDURE — G8427 DOCREV CUR MEDS BY ELIG CLIN: HCPCS | Performed by: FAMILY MEDICINE

## 2023-09-06 PROCEDURE — 1036F TOBACCO NON-USER: CPT | Performed by: FAMILY MEDICINE

## 2023-09-06 PROCEDURE — G0439 PPPS, SUBSEQ VISIT: HCPCS | Performed by: FAMILY MEDICINE

## 2023-09-06 PROCEDURE — G8417 CALC BMI ABV UP PARAM F/U: HCPCS | Performed by: FAMILY MEDICINE

## 2023-09-06 SDOH — ECONOMIC STABILITY: FOOD INSECURITY: WITHIN THE PAST 12 MONTHS, THE FOOD YOU BOUGHT JUST DIDN'T LAST AND YOU DIDN'T HAVE MONEY TO GET MORE.: NEVER TRUE

## 2023-09-06 SDOH — ECONOMIC STABILITY: FOOD INSECURITY: WITHIN THE PAST 12 MONTHS, YOU WORRIED THAT YOUR FOOD WOULD RUN OUT BEFORE YOU GOT MONEY TO BUY MORE.: NEVER TRUE

## 2023-09-06 SDOH — ECONOMIC STABILITY: INCOME INSECURITY: HOW HARD IS IT FOR YOU TO PAY FOR THE VERY BASICS LIKE FOOD, HOUSING, MEDICAL CARE, AND HEATING?: NOT HARD AT ALL

## 2023-09-06 SDOH — ECONOMIC STABILITY: HOUSING INSECURITY
IN THE LAST 12 MONTHS, WAS THERE A TIME WHEN YOU DID NOT HAVE A STEADY PLACE TO SLEEP OR SLEPT IN A SHELTER (INCLUDING NOW)?: NO

## 2023-09-06 ASSESSMENT — PATIENT HEALTH QUESTIONNAIRE - PHQ9
1. LITTLE INTEREST OR PLEASURE IN DOING THINGS: 0
SUM OF ALL RESPONSES TO PHQ QUESTIONS 1-9: 0
SUM OF ALL RESPONSES TO PHQ QUESTIONS 1-9: 0
2. FEELING DOWN, DEPRESSED OR HOPELESS: 0
SUM OF ALL RESPONSES TO PHQ QUESTIONS 1-9: 0
SUM OF ALL RESPONSES TO PHQ9 QUESTIONS 1 & 2: 0
SUM OF ALL RESPONSES TO PHQ QUESTIONS 1-9: 0

## 2023-09-06 ASSESSMENT — LIFESTYLE VARIABLES
HOW MANY STANDARD DRINKS CONTAINING ALCOHOL DO YOU HAVE ON A TYPICAL DAY: PATIENT DOES NOT DRINK
HOW OFTEN DO YOU HAVE A DRINK CONTAINING ALCOHOL: NEVER

## 2023-09-07 LAB — TSH SERPL DL<=0.05 MIU/L-ACNC: 2.2 UIU/ML (ref 0.36–3.74)

## 2023-10-25 ENCOUNTER — TELEPHONE (OUTPATIENT)
Age: 78
End: 2023-10-25

## 2023-10-25 NOTE — TELEPHONE ENCOUNTER
----- Message from Vickie Konstantinbrennen sent at 10/25/2023 11:54 AM EDT -----  Subject: Appointment Request    Reason for Call: Established Patient Appointment needed: Routine ED Follow   Up Visit    QUESTIONS    Reason for appointment request? No appointments available during search     Additional Information for Provider? Pt was seen in the ED on 10/23/23 at   [facility] for heart palpitations. Pt would like to schedule his   ED f/u appt after 10/30/23.  Pt stated that is when he is seeing a   Cardiologist and would like to schedule an appt after that.  ---------------------------------------------------------------------------  --------------  Morena Boaz Cassie  [phone]; OK to leave message on voicemail  ---------------------------------------------------------------------------  --------------  SCRIPT ANSWERS

## 2023-11-13 ENCOUNTER — OFFICE VISIT (OUTPATIENT)
Age: 78
End: 2023-11-13
Payer: MEDICARE

## 2023-11-13 VITALS
DIASTOLIC BLOOD PRESSURE: 64 MMHG | SYSTOLIC BLOOD PRESSURE: 124 MMHG | TEMPERATURE: 97.9 F | HEART RATE: 60 BPM | BODY MASS INDEX: 30.16 KG/M2 | OXYGEN SATURATION: 96 % | RESPIRATION RATE: 14 BRPM | HEIGHT: 65 IN | WEIGHT: 181 LBS

## 2023-11-13 DIAGNOSIS — I47.10 SVT (SUPRAVENTRICULAR TACHYCARDIA): Primary | ICD-10-CM

## 2023-11-13 DIAGNOSIS — I10 ESSENTIAL HYPERTENSION, BENIGN: ICD-10-CM

## 2023-11-13 PROCEDURE — 99214 OFFICE O/P EST MOD 30 MIN: CPT | Performed by: FAMILY MEDICINE

## 2023-11-13 PROCEDURE — G8427 DOCREV CUR MEDS BY ELIG CLIN: HCPCS | Performed by: FAMILY MEDICINE

## 2023-11-13 PROCEDURE — 1036F TOBACCO NON-USER: CPT | Performed by: FAMILY MEDICINE

## 2023-11-13 PROCEDURE — 1123F ACP DISCUSS/DSCN MKR DOCD: CPT | Performed by: FAMILY MEDICINE

## 2023-11-13 PROCEDURE — 3074F SYST BP LT 130 MM HG: CPT | Performed by: FAMILY MEDICINE

## 2023-11-13 PROCEDURE — 3078F DIAST BP <80 MM HG: CPT | Performed by: FAMILY MEDICINE

## 2023-11-13 PROCEDURE — G8417 CALC BMI ABV UP PARAM F/U: HCPCS | Performed by: FAMILY MEDICINE

## 2023-11-13 PROCEDURE — G8484 FLU IMMUNIZE NO ADMIN: HCPCS | Performed by: FAMILY MEDICINE

## 2023-11-13 NOTE — PROGRESS NOTES
Chief Complaint   Patient presents with    ED Follow-up     [facility] ED: 10/23/23 palpitations: SVT    Hypertension     [facility]: Dr Patria Gonzalez test done     1. Have you been to the ER, urgent care clinic since your last visit? Hospitalized since your last visit? [facility] ED: 10/23/23 palpitations: SVT    2. Have you seen or consulted any other health care providers outside of the [address] [place] [address] since your last visit? Include any pap smears or colon screening.  [facility]: Dr Vanna Garza

## 2023-11-13 NOTE — PROGRESS NOTES
Chief Complaint   Patient presents with    ED Follow-up     [facility] ED: 10/23/23 palpitations: SVT    Hypertension     [facility]: Dr Thania Crenshaw test done     1. Have you been to the ER, urgent care clinic since your last visit? Hospitalized since your last visit? [facility] ED: 10/23/23 palpitations: SVT    2. Have you seen or consulted any other health care providers outside of the [address] [place] [address] Avenue since your last visit? Include any pap smears or colon screening. [facility]: Dr Dandy Rubin    Chief Complaint   Patient presents with    ED Follow-up     [facility] ED: 10/23/23 palpitations: SVT    Hypertension     [facility]: Dr Thania Crenshaw test done     He is a 78 y.o. male who presents for evalution. Reviewed PmHx, RxHx, FmHx, SocHx, AllgHx and updated and dated in the chart.     CURRENT MEDS W/ ASSOC DIAG           Start Date End Date     aspirin 81 MG chewable tablet  --  --     Associated Diagnoses:  --     BORON PO  --  --     Associated Diagnoses:  --     calcium citrate-vitamin D (CITRACAL+D) 315-5 MG-MCG TABS per tablet  --  --     Associated Diagnoses:  --     Cholecalciferol 50 MCG (2000 UT) TABS  --  --     Associated Diagnoses:  --     fexofenadine (ALLEGRA) 180 MG tablet  04/21/22  --     Associated Diagnoses:  --     ketoconazole (NIZORAL) 2 % cream  --  --     Associated Diagnoses:  --     Lycopene 15 MG CAPS  --  --     Associated Diagnoses:  --     Menaquinone-7 (VITAMIN K2 PO)  --  --     Associated Diagnoses:  --     metoprolol tartrate (LOPRESSOR) 25 MG tablet  10/24/23  --     Associated Diagnoses:  --     olmesartan (BENICAR) 40 MG tablet  02/14/22  --     Associated Diagnoses:  --     simvastatin (ZOCOR) 20 MG tablet  07/06/23  --     Take 1 tablet by mouth nightly     Associated Diagnoses:  --     SOY ISOFLAVONE PO  --  --     Associated Diagnoses:  --             Patient Active Problem List   Diagnosis Code    Advanced care

## 2024-02-20 ENCOUNTER — TELEPHONE (OUTPATIENT)
Age: 79
End: 2024-02-20

## 2024-02-20 RX ORDER — CLOTRIMAZOLE AND BETAMETHASONE DIPROPIONATE 10; .64 MG/G; MG/G
CREAM TOPICAL
Qty: 45 G | Refills: 3 | Status: SHIPPED | OUTPATIENT
Start: 2024-02-20

## 2024-02-20 NOTE — TELEPHONE ENCOUNTER
[facility] needs a refill of Clotrimazole and Betamethasone Cream.  They have 0 pills/supply left and are requesting a ? day supply with refills.  Pharmacy has been updated in the chart. Patient was advised or scheduled an appointment for the future and to request refills thru the [employer] [facility] or by requesting a refill from their pharmacy in the future.  Patient was also advised to check with their pharmacy for status of when refills are available.

## 2024-03-05 ENCOUNTER — OFFICE VISIT (OUTPATIENT)
Age: 79
End: 2024-03-05
Payer: MEDICARE

## 2024-03-05 VITALS
TEMPERATURE: 98.5 F | WEIGHT: 177 LBS | OXYGEN SATURATION: 96 % | RESPIRATION RATE: 18 BRPM | HEIGHT: 65 IN | HEART RATE: 60 BPM | SYSTOLIC BLOOD PRESSURE: 117 MMHG | DIASTOLIC BLOOD PRESSURE: 72 MMHG | BODY MASS INDEX: 29.49 KG/M2

## 2024-03-05 DIAGNOSIS — I10 ESSENTIAL HYPERTENSION, BENIGN: Primary | ICD-10-CM

## 2024-03-05 DIAGNOSIS — F42.9 OBSESSIVE-COMPULSIVE DISORDER, UNSPECIFIED TYPE: ICD-10-CM

## 2024-03-05 DIAGNOSIS — C61 PROSTATE CANCER (HCC): ICD-10-CM

## 2024-03-05 DIAGNOSIS — E55.9 VITAMIN D DEFICIENCY: ICD-10-CM

## 2024-03-05 DIAGNOSIS — R73.03 PRE-DIABETES: ICD-10-CM

## 2024-03-05 DIAGNOSIS — E22.2 SIADH (SYNDROME OF INAPPROPRIATE ADH PRODUCTION) (HCC): ICD-10-CM

## 2024-03-05 DIAGNOSIS — E78.2 MIXED HYPERLIPIDEMIA: ICD-10-CM

## 2024-03-05 LAB
ALBUMIN SERPL-MCNC: 3.7 G/DL (ref 3.5–5)
ALBUMIN/GLOB SERPL: 1.4 (ref 1.1–2.2)
ALP SERPL-CCNC: 39 U/L (ref 45–117)
ALT SERPL-CCNC: 28 U/L (ref 12–78)
ANION GAP SERPL CALC-SCNC: 5 MMOL/L (ref 5–15)
AST SERPL-CCNC: 20 U/L (ref 15–37)
BILIRUB SERPL-MCNC: 0.8 MG/DL (ref 0.2–1)
BUN SERPL-MCNC: 27 MG/DL (ref 6–20)
BUN/CREAT SERPL: 24 (ref 12–20)
CALCIUM SERPL-MCNC: 9 MG/DL (ref 8.5–10.1)
CHLORIDE SERPL-SCNC: 104 MMOL/L (ref 97–108)
CHOLEST SERPL-MCNC: 145 MG/DL
CO2 SERPL-SCNC: 26 MMOL/L (ref 21–32)
CREAT SERPL-MCNC: 1.13 MG/DL (ref 0.7–1.3)
EST. AVERAGE GLUCOSE BLD GHB EST-MCNC: 111 MG/DL
GLOBULIN SER CALC-MCNC: 2.6 G/DL (ref 2–4)
GLUCOSE SERPL-MCNC: 98 MG/DL (ref 65–100)
HBA1C MFR BLD: 5.5 % (ref 4–5.6)
HDLC SERPL-MCNC: 47 MG/DL
HDLC SERPL: 3.1 (ref 0–5)
LDLC SERPL CALC-MCNC: 81.8 MG/DL (ref 0–100)
POTASSIUM SERPL-SCNC: 4.6 MMOL/L (ref 3.5–5.1)
PROT SERPL-MCNC: 6.3 G/DL (ref 6.4–8.2)
SODIUM SERPL-SCNC: 135 MMOL/L (ref 136–145)
TRIGL SERPL-MCNC: 81 MG/DL
VLDLC SERPL CALC-MCNC: 16.2 MG/DL

## 2024-03-05 PROCEDURE — 3078F DIAST BP <80 MM HG: CPT | Performed by: FAMILY MEDICINE

## 2024-03-05 PROCEDURE — 99214 OFFICE O/P EST MOD 30 MIN: CPT | Performed by: FAMILY MEDICINE

## 2024-03-05 PROCEDURE — G8484 FLU IMMUNIZE NO ADMIN: HCPCS | Performed by: FAMILY MEDICINE

## 2024-03-05 PROCEDURE — 1123F ACP DISCUSS/DSCN MKR DOCD: CPT | Performed by: FAMILY MEDICINE

## 2024-03-05 PROCEDURE — G8427 DOCREV CUR MEDS BY ELIG CLIN: HCPCS | Performed by: FAMILY MEDICINE

## 2024-03-05 PROCEDURE — 3074F SYST BP LT 130 MM HG: CPT | Performed by: FAMILY MEDICINE

## 2024-03-05 PROCEDURE — G8417 CALC BMI ABV UP PARAM F/U: HCPCS | Performed by: FAMILY MEDICINE

## 2024-03-05 PROCEDURE — 1036F TOBACCO NON-USER: CPT | Performed by: FAMILY MEDICINE

## 2024-03-05 RX ORDER — LANOLIN ALCOHOL/MO/W.PET/CERES
9000 CREAM (GRAM) TOPICAL DAILY
COMMUNITY

## 2024-03-05 RX ORDER — AMPICILLIN TRIHYDRATE 250 MG
200 CAPSULE ORAL
COMMUNITY

## 2024-03-05 ASSESSMENT — PATIENT HEALTH QUESTIONNAIRE - PHQ9
1. LITTLE INTEREST OR PLEASURE IN DOING THINGS: 0
SUM OF ALL RESPONSES TO PHQ QUESTIONS 1-9: 0
SUM OF ALL RESPONSES TO PHQ QUESTIONS 1-9: 0
SUM OF ALL RESPONSES TO PHQ9 QUESTIONS 1 & 2: 0
SUM OF ALL RESPONSES TO PHQ QUESTIONS 1-9: 0
SUM OF ALL RESPONSES TO PHQ QUESTIONS 1-9: 0
2. FEELING DOWN, DEPRESSED OR HOPELESS: 0

## 2024-03-05 NOTE — PROGRESS NOTES
Patient here for htn, fasting labs x 6 month follow up.     1. Have you been to the ER, urgent care clinic since your last visit?  Hospitalized since your last visit?No    2. Have you seen or consulted any other health care providers [facility]?  Include any pap smears or colon screening. No

## 2024-03-05 NOTE — PROGRESS NOTES
Patient here for htn, fasting labs x 6 month follow up.     1. Have you been to the ER, urgent care clinic since your last visit?  Hospitalized since your last visit?No    2. Have you seen or consulted any other health care providers outside of the [facility] since your last visit?  Include any pap smears or colon screening. No      Chief Complaint   Patient presents with    Hypertension    Cholesterol Problem     6 month fasting labs     He is a 79 y.o. male who presents for evalution.     Reviewed PmHx, RxHx, FmHx, SocHx, AllgHx and updated and dated in the chart.    CURRENT MEDS W/ ASSOC DIAG           Start Date End Date     aspirin 81 MG chewable tablet  --  --     Associated Diagnoses:  --     BORON PO  --  --     Associated Diagnoses:  --     calcium citrate-vitamin D (CITRACAL+D) 315-5 MG-MCG TABS per tablet  --  --     Associated Diagnoses:  --     Cholecalciferol 50 MCG (2000 UT) TABS  --  --     Associated Diagnoses:  --     clotrimazole-betamethasone (LOTRISONE) 1-0.05 % cream  02/20/24  --     Apply topically 2 times daily.     Associated Diagnoses:  --     Coenzyme Q10 (COQ10) 200 MG CAPS  --  --     Associated Diagnoses:  --     fexofenadine (ALLEGRA) 180 MG tablet  04/21/22  --     Associated Diagnoses:  --     ketoconazole (NIZORAL) 2 % cream  --  --     Associated Diagnoses:  --     Lycopene 15 MG CAPS  --  --     Associated Diagnoses:  --     Menaquinone-7 (VITAMIN K2 PO)  --  --     Associated Diagnoses:  --     metoprolol tartrate (LOPRESSOR) 25 MG tablet  10/24/23  --     Associated Diagnoses:  --     niacin (SLO-NIACIN) 500 MG extended release tablet  --  --     Associated Diagnoses:  --     olmesartan (BENICAR) 40 MG tablet  02/14/22  --     Associated Diagnoses:  --     simvastatin (ZOCOR) 20 MG tablet  07/06/23  --     Take 1 tablet by mouth nightly     Associated Diagnoses:  --     SOY ISOFLAVONE PO  --  --     Associated Diagnoses:  --             Patient Active Problem

## 2024-03-20 RX ORDER — OLMESARTAN MEDOXOMIL 40 MG/1
TABLET ORAL
Qty: 90 TABLET | Refills: 5 | Status: SHIPPED | OUTPATIENT
Start: 2024-03-20

## 2024-06-27 RX ORDER — SIMVASTATIN 20 MG
20 TABLET ORAL NIGHTLY
Qty: 90 TABLET | Refills: 3 | Status: SHIPPED | OUTPATIENT
Start: 2024-06-27

## 2024-09-10 ENCOUNTER — OFFICE VISIT (OUTPATIENT)
Age: 79
End: 2024-09-10
Payer: MEDICARE

## 2024-09-10 VITALS
SYSTOLIC BLOOD PRESSURE: 118 MMHG | OXYGEN SATURATION: 97 % | HEIGHT: 65 IN | BODY MASS INDEX: 28.66 KG/M2 | DIASTOLIC BLOOD PRESSURE: 67 MMHG | HEART RATE: 55 BPM | TEMPERATURE: 98.1 F | WEIGHT: 172 LBS | RESPIRATION RATE: 16 BRPM

## 2024-09-10 DIAGNOSIS — E78.2 MIXED HYPERLIPIDEMIA: ICD-10-CM

## 2024-09-10 DIAGNOSIS — E22.2 SIADH (SYNDROME OF INAPPROPRIATE ADH PRODUCTION) (HCC): ICD-10-CM

## 2024-09-10 DIAGNOSIS — C61 PROSTATE CANCER (HCC): ICD-10-CM

## 2024-09-10 DIAGNOSIS — I47.10 SVT (SUPRAVENTRICULAR TACHYCARDIA) (HCC): ICD-10-CM

## 2024-09-10 DIAGNOSIS — I10 ESSENTIAL HYPERTENSION, BENIGN: ICD-10-CM

## 2024-09-10 DIAGNOSIS — R73.9 ELEVATED BLOOD SUGAR: ICD-10-CM

## 2024-09-10 DIAGNOSIS — E87.1 HYPONATREMIA: ICD-10-CM

## 2024-09-10 DIAGNOSIS — Z00.01 ENCOUNTER FOR MEDICARE ANNUAL EXAMINATION WITH ABNORMAL FINDINGS: ICD-10-CM

## 2024-09-10 DIAGNOSIS — Z00.01 ENCOUNTER FOR MEDICARE ANNUAL EXAMINATION WITH ABNORMAL FINDINGS: Primary | ICD-10-CM

## 2024-09-10 PROCEDURE — 1036F TOBACCO NON-USER: CPT | Performed by: FAMILY MEDICINE

## 2024-09-10 PROCEDURE — 1123F ACP DISCUSS/DSCN MKR DOCD: CPT | Performed by: FAMILY MEDICINE

## 2024-09-10 PROCEDURE — G8417 CALC BMI ABV UP PARAM F/U: HCPCS | Performed by: FAMILY MEDICINE

## 2024-09-10 PROCEDURE — 99214 OFFICE O/P EST MOD 30 MIN: CPT | Performed by: FAMILY MEDICINE

## 2024-09-10 PROCEDURE — G8427 DOCREV CUR MEDS BY ELIG CLIN: HCPCS | Performed by: FAMILY MEDICINE

## 2024-09-10 PROCEDURE — 3074F SYST BP LT 130 MM HG: CPT | Performed by: FAMILY MEDICINE

## 2024-09-10 PROCEDURE — G0439 PPPS, SUBSEQ VISIT: HCPCS | Performed by: FAMILY MEDICINE

## 2024-09-10 PROCEDURE — 3078F DIAST BP <80 MM HG: CPT | Performed by: FAMILY MEDICINE

## 2024-09-10 SDOH — ECONOMIC STABILITY: FOOD INSECURITY: WITHIN THE PAST 12 MONTHS, THE FOOD YOU BOUGHT JUST DIDN'T LAST AND YOU DIDN'T HAVE MONEY TO GET MORE.: NEVER TRUE

## 2024-09-10 SDOH — ECONOMIC STABILITY: INCOME INSECURITY: HOW HARD IS IT FOR YOU TO PAY FOR THE VERY BASICS LIKE FOOD, HOUSING, MEDICAL CARE, AND HEATING?: NOT HARD AT ALL

## 2024-09-10 SDOH — ECONOMIC STABILITY: FOOD INSECURITY: WITHIN THE PAST 12 MONTHS, YOU WORRIED THAT YOUR FOOD WOULD RUN OUT BEFORE YOU GOT MONEY TO BUY MORE.: NEVER TRUE

## 2024-09-10 ASSESSMENT — PATIENT HEALTH QUESTIONNAIRE - PHQ9
SUM OF ALL RESPONSES TO PHQ QUESTIONS 1-9: 0
SUM OF ALL RESPONSES TO PHQ QUESTIONS 1-9: 0
SUM OF ALL RESPONSES TO PHQ9 QUESTIONS 1 & 2: 0
SUM OF ALL RESPONSES TO PHQ QUESTIONS 1-9: 0
2. FEELING DOWN, DEPRESSED OR HOPELESS: NOT AT ALL
SUM OF ALL RESPONSES TO PHQ QUESTIONS 1-9: 0
1. LITTLE INTEREST OR PLEASURE IN DOING THINGS: NOT AT ALL

## 2024-09-11 LAB
25(OH)D3 SERPL-MCNC: NORMAL NG/ML (ref 30–100)
HCYS SERPL-SCNC: 13.7 UMOL/L (ref 3.7–13.9)

## 2024-09-16 DIAGNOSIS — I47.10 SVT (SUPRAVENTRICULAR TACHYCARDIA) (HCC): ICD-10-CM

## 2024-09-16 DIAGNOSIS — E22.2 SIADH (SYNDROME OF INAPPROPRIATE ADH PRODUCTION) (HCC): ICD-10-CM

## 2024-09-16 DIAGNOSIS — R73.03 PRE-DIABETES: ICD-10-CM

## 2024-09-16 DIAGNOSIS — I10 ESSENTIAL HYPERTENSION, BENIGN: Primary | ICD-10-CM

## 2024-09-16 DIAGNOSIS — E78.2 MIXED HYPERLIPIDEMIA: ICD-10-CM

## 2024-09-17 DIAGNOSIS — E22.2 SIADH (SYNDROME OF INAPPROPRIATE ADH PRODUCTION) (HCC): ICD-10-CM

## 2024-09-17 DIAGNOSIS — I10 ESSENTIAL HYPERTENSION, BENIGN: ICD-10-CM

## 2024-09-17 DIAGNOSIS — R73.03 PRE-DIABETES: ICD-10-CM

## 2024-09-17 DIAGNOSIS — I47.10 SVT (SUPRAVENTRICULAR TACHYCARDIA) (HCC): ICD-10-CM

## 2024-09-17 DIAGNOSIS — E55.9 VITAMIN D DEFICIENCY: ICD-10-CM

## 2024-09-17 DIAGNOSIS — E55.9 VITAMIN D DEFICIENCY: Primary | ICD-10-CM

## 2024-09-17 DIAGNOSIS — E78.2 MIXED HYPERLIPIDEMIA: ICD-10-CM

## 2024-09-18 LAB
25(OH)D3 SERPL-MCNC: 73.7 NG/ML (ref 30–100)
ALBUMIN SERPL-MCNC: 3.7 G/DL (ref 3.5–5)
ALBUMIN/GLOB SERPL: 1.5 (ref 1.1–2.2)
ALP SERPL-CCNC: 38 U/L (ref 45–117)
ALT SERPL-CCNC: 24 U/L (ref 12–78)
ANION GAP SERPL CALC-SCNC: 5 MMOL/L (ref 2–12)
AST SERPL-CCNC: 22 U/L (ref 15–37)
BASOPHILS # BLD: 0.1 K/UL (ref 0–0.1)
BASOPHILS NFR BLD: 1 % (ref 0–1)
BILIRUB SERPL-MCNC: 0.6 MG/DL (ref 0.2–1)
BUN SERPL-MCNC: 21 MG/DL (ref 6–20)
BUN/CREAT SERPL: 18 (ref 12–20)
CALCIUM SERPL-MCNC: 8.8 MG/DL (ref 8.5–10.1)
CHLORIDE SERPL-SCNC: 104 MMOL/L (ref 97–108)
CHOLEST SERPL-MCNC: 147 MG/DL
CO2 SERPL-SCNC: 26 MMOL/L (ref 21–32)
CREAT SERPL-MCNC: 1.18 MG/DL (ref 0.7–1.3)
CRP SERPL HS-MCNC: 0.4 MG/L
DIFFERENTIAL METHOD BLD: NORMAL
EOSINOPHIL # BLD: 0.3 K/UL (ref 0–0.4)
EOSINOPHIL NFR BLD: 5 % (ref 0–7)
ERYTHROCYTE [DISTWIDTH] IN BLOOD BY AUTOMATED COUNT: 13.5 % (ref 11.5–14.5)
GLOBULIN SER CALC-MCNC: 2.5 G/DL (ref 2–4)
GLUCOSE SERPL-MCNC: 94 MG/DL (ref 65–100)
HCT VFR BLD AUTO: 38.7 % (ref 36.6–50.3)
HDLC SERPL-MCNC: 53 MG/DL
HDLC SERPL: 2.8 (ref 0–5)
HGB BLD-MCNC: 13 G/DL (ref 12.1–17)
IMM GRANULOCYTES # BLD AUTO: 0 K/UL (ref 0–0.04)
IMM GRANULOCYTES NFR BLD AUTO: 0 % (ref 0–0.5)
LDLC SERPL CALC-MCNC: 74.4 MG/DL (ref 0–100)
LYMPHOCYTES # BLD: 1.7 K/UL (ref 0.8–3.5)
LYMPHOCYTES NFR BLD: 29 % (ref 12–49)
MCH RBC QN AUTO: 30.7 PG (ref 26–34)
MCHC RBC AUTO-ENTMCNC: 33.6 G/DL (ref 30–36.5)
MCV RBC AUTO: 91.3 FL (ref 80–99)
MONOCYTES # BLD: 0.7 K/UL (ref 0–1)
MONOCYTES NFR BLD: 12 % (ref 5–13)
NEUTS SEG # BLD: 3 K/UL (ref 1.8–8)
NEUTS SEG NFR BLD: 53 % (ref 32–75)
NRBC # BLD: 0 K/UL (ref 0–0.01)
NRBC BLD-RTO: 0 PER 100 WBC
PLATELET # BLD AUTO: 199 K/UL (ref 150–400)
PMV BLD AUTO: 10.6 FL (ref 8.9–12.9)
POTASSIUM SERPL-SCNC: 4.6 MMOL/L (ref 3.5–5.1)
PROT SERPL-MCNC: 6.2 G/DL (ref 6.4–8.2)
RBC # BLD AUTO: 4.24 M/UL (ref 4.1–5.7)
SODIUM SERPL-SCNC: 135 MMOL/L (ref 136–145)
TRIGL SERPL-MCNC: 98 MG/DL
TSH SERPL DL<=0.05 MIU/L-ACNC: 2.04 UIU/ML (ref 0.36–3.74)
VLDLC SERPL CALC-MCNC: 19.6 MG/DL
WBC # BLD AUTO: 5.7 K/UL (ref 4.1–11.1)

## 2024-10-07 ENCOUNTER — TELEPHONE (OUTPATIENT)
Age: 79
End: 2024-10-07

## 2024-10-07 NOTE — TELEPHONE ENCOUNTER
----- Message from LAURIE CHANG LPN sent at 10/7/2024  1:58 PM EDT -----  Regarding: FW: ECC Message to Provider    ----- Message -----  From: Jackeline Trujillo  Sent: 10/7/2024  12:05 PM EDT  To: [facility] Staff  Subject: ECC Message to Provider                          ECC Message to Provider    Relationship to Patient: Self     Additional Information patient want to make an appointment for removal of ear wax. Tried to reach the practice but it's already closed  --------------------------------------------------------------------------------------------------------------------------    Call Back Information: OK to leave message on voicemail  Preferred Call Back Number: Phone  [phone]

## 2024-11-18 ENCOUNTER — OFFICE VISIT (OUTPATIENT)
Facility: CLINIC | Age: 79
End: 2024-11-18

## 2024-11-18 VITALS
DIASTOLIC BLOOD PRESSURE: 67 MMHG | RESPIRATION RATE: 18 BRPM | OXYGEN SATURATION: 98 % | BODY MASS INDEX: 30.39 KG/M2 | HEART RATE: 54 BPM | WEIGHT: 178 LBS | HEIGHT: 64 IN | SYSTOLIC BLOOD PRESSURE: 116 MMHG

## 2024-11-18 DIAGNOSIS — H92.03 OTALGIA OF BOTH EARS: Primary | ICD-10-CM

## 2024-11-18 NOTE — PROGRESS NOTES
** Appointment was cancelled as ears did not need to be cleaned.       Alfonso Gilliam is a 79 y.o. male , id x 2(name and ). Reviewed record, history, and  medications.    Chief Complaint   Patient presents with    Cerumen Impaction     Pt reports no issues with hearing.        Health Maintenance Due   Topic    Respiratory Syncytial Virus (RSV) Pregnant or age 60 yrs+ (1 - 1-dose 75+ series)    Flu vaccine (1)    COVID-19 Vaccine (2023- season)       Wt Readings from Last 1 Encounters:   24 80.7 kg (178 lb)     BP Readings from Last 3 Encounters:   24 116/67   09/10/24 118/67   24 117/72     Pulse Readings from Last 1 Encounters:   24 54         Patient is currently accompanied by n/a & I have received verbal consent from Alfonso Gilliam to discuss any/all medical information while he/she is present in the room.    Home BP cuff present today:  no    Home medication list or bottles present today: no  - All medications were reviewed and updated with the patient today in office.    Forms for completion: no    Chest pain/pressure/discomfort: no    Shortness of breath:  no    Surgery within the next month:  no      Depression Screening:  :     Depression: Not at risk (9/10/2024)    PHQ-2     PHQ-2 Score: 0          Coordination of Care Questionnaire:  :     \"Have you been to the ER, urgent care clinic since your last visit?  Hospitalized since your last visit?\"    NO    “Have you seen or consulted any other health care providers outside of [facility] since your last visit?”    NO      Click Here for Release of Records Request      --Claudia Meier MA       ------------------------------------------------   Face to face

## 2025-03-11 ENCOUNTER — OFFICE VISIT (OUTPATIENT)
Facility: CLINIC | Age: 80
End: 2025-03-11
Payer: MEDICARE

## 2025-03-11 VITALS
RESPIRATION RATE: 16 BRPM | HEART RATE: 54 BPM | BODY MASS INDEX: 28.82 KG/M2 | OXYGEN SATURATION: 98 % | DIASTOLIC BLOOD PRESSURE: 70 MMHG | WEIGHT: 173 LBS | HEIGHT: 65 IN | TEMPERATURE: 97.7 F | SYSTOLIC BLOOD PRESSURE: 127 MMHG

## 2025-03-11 DIAGNOSIS — C61 PROSTATE CANCER (HCC): ICD-10-CM

## 2025-03-11 DIAGNOSIS — E78.2 MIXED HYPERLIPIDEMIA: ICD-10-CM

## 2025-03-11 DIAGNOSIS — F42.9 OBSESSIVE-COMPULSIVE DISORDER, UNSPECIFIED TYPE: ICD-10-CM

## 2025-03-11 DIAGNOSIS — E22.2 SIADH (SYNDROME OF INAPPROPRIATE ADH PRODUCTION): ICD-10-CM

## 2025-03-11 DIAGNOSIS — E55.9 VITAMIN D DEFICIENCY: ICD-10-CM

## 2025-03-11 DIAGNOSIS — R73.9 ELEVATED BLOOD SUGAR: ICD-10-CM

## 2025-03-11 DIAGNOSIS — E87.1 HYPONATREMIA: ICD-10-CM

## 2025-03-11 DIAGNOSIS — I10 ESSENTIAL HYPERTENSION, BENIGN: Primary | ICD-10-CM

## 2025-03-11 LAB
ALBUMIN SERPL-MCNC: 3.9 G/DL (ref 3.5–5)
ALBUMIN/GLOB SERPL: 1.6 (ref 1.1–2.2)
ALP SERPL-CCNC: 31 U/L (ref 45–117)
ALT SERPL-CCNC: 26 U/L (ref 12–78)
ANION GAP SERPL CALC-SCNC: 9 MMOL/L (ref 2–12)
AST SERPL-CCNC: 24 U/L (ref 15–37)
BASOPHILS # BLD: 0.08 K/UL (ref 0–0.1)
BASOPHILS NFR BLD: 1.1 % (ref 0–1)
BILIRUB SERPL-MCNC: 0.8 MG/DL (ref 0.2–1)
BUN SERPL-MCNC: 26 MG/DL (ref 6–20)
BUN/CREAT SERPL: 21 (ref 12–20)
CALCIUM SERPL-MCNC: 9.5 MG/DL (ref 8.5–10.1)
CHLORIDE SERPL-SCNC: 104 MMOL/L (ref 97–108)
CHOLEST SERPL-MCNC: 157 MG/DL
CO2 SERPL-SCNC: 24 MMOL/L (ref 21–32)
CREAT SERPL-MCNC: 1.23 MG/DL (ref 0.7–1.3)
DIFFERENTIAL METHOD BLD: ABNORMAL
EOSINOPHIL # BLD: 0.4 K/UL (ref 0–0.4)
EOSINOPHIL NFR BLD: 5.6 % (ref 0–7)
ERYTHROCYTE [DISTWIDTH] IN BLOOD BY AUTOMATED COUNT: 13.5 % (ref 11.5–14.5)
GLOBULIN SER CALC-MCNC: 2.5 G/DL (ref 2–4)
GLUCOSE SERPL-MCNC: 102 MG/DL (ref 65–100)
HCT VFR BLD AUTO: 40.9 % (ref 36.6–50.3)
HDLC SERPL-MCNC: 52 MG/DL
HDLC SERPL: 3 (ref 0–5)
HGB BLD-MCNC: 13.9 G/DL (ref 12.1–17)
IMM GRANULOCYTES # BLD AUTO: 0.02 K/UL (ref 0–0.04)
IMM GRANULOCYTES NFR BLD AUTO: 0.3 % (ref 0–0.5)
LDLC SERPL CALC-MCNC: 87.4 MG/DL (ref 0–100)
LYMPHOCYTES # BLD: 1.86 K/UL (ref 0.8–3.5)
LYMPHOCYTES NFR BLD: 26.2 % (ref 12–49)
MCH RBC QN AUTO: 30.8 PG (ref 26–34)
MCHC RBC AUTO-ENTMCNC: 34 G/DL (ref 30–36.5)
MCV RBC AUTO: 90.7 FL (ref 80–99)
MONOCYTES # BLD: 0.78 K/UL (ref 0–1)
MONOCYTES NFR BLD: 11 % (ref 5–13)
NEUTS SEG # BLD: 3.96 K/UL (ref 1.8–8)
NEUTS SEG NFR BLD: 55.8 % (ref 32–75)
NRBC # BLD: 0 K/UL (ref 0–0.01)
NRBC BLD-RTO: 0 PER 100 WBC
PLATELET # BLD AUTO: 183 K/UL (ref 150–400)
PMV BLD AUTO: 10.5 FL (ref 8.9–12.9)
POTASSIUM SERPL-SCNC: 4.6 MMOL/L (ref 3.5–5.1)
PROT SERPL-MCNC: 6.4 G/DL (ref 6.4–8.2)
RBC # BLD AUTO: 4.51 M/UL (ref 4.1–5.7)
SODIUM SERPL-SCNC: 137 MMOL/L (ref 136–145)
TRIGL SERPL-MCNC: 88 MG/DL
VLDLC SERPL CALC-MCNC: 17.6 MG/DL
WBC # BLD AUTO: 7.1 K/UL (ref 4.1–11.1)

## 2025-03-11 PROCEDURE — G8427 DOCREV CUR MEDS BY ELIG CLIN: HCPCS | Performed by: FAMILY MEDICINE

## 2025-03-11 PROCEDURE — 1036F TOBACCO NON-USER: CPT | Performed by: FAMILY MEDICINE

## 2025-03-11 PROCEDURE — 1126F AMNT PAIN NOTED NONE PRSNT: CPT | Performed by: FAMILY MEDICINE

## 2025-03-11 PROCEDURE — 99214 OFFICE O/P EST MOD 30 MIN: CPT | Performed by: FAMILY MEDICINE

## 2025-03-11 PROCEDURE — 1123F ACP DISCUSS/DSCN MKR DOCD: CPT | Performed by: FAMILY MEDICINE

## 2025-03-11 PROCEDURE — 1159F MED LIST DOCD IN RCRD: CPT | Performed by: FAMILY MEDICINE

## 2025-03-11 PROCEDURE — 1160F RVW MEDS BY RX/DR IN RCRD: CPT | Performed by: FAMILY MEDICINE

## 2025-03-11 PROCEDURE — 3074F SYST BP LT 130 MM HG: CPT | Performed by: FAMILY MEDICINE

## 2025-03-11 PROCEDURE — 3078F DIAST BP <80 MM HG: CPT | Performed by: FAMILY MEDICINE

## 2025-03-11 PROCEDURE — G8417 CALC BMI ABV UP PARAM F/U: HCPCS | Performed by: FAMILY MEDICINE

## 2025-03-11 RX ORDER — ASCORBIC ACID 500 MG
500 TABLET ORAL DAILY
COMMUNITY

## 2025-03-11 RX ORDER — CHLORAL HYDRATE 500 MG
CAPSULE ORAL
COMMUNITY

## 2025-03-11 SDOH — ECONOMIC STABILITY: FOOD INSECURITY: WITHIN THE PAST 12 MONTHS, YOU WORRIED THAT YOUR FOOD WOULD RUN OUT BEFORE YOU GOT MONEY TO BUY MORE.: NEVER TRUE

## 2025-03-11 SDOH — ECONOMIC STABILITY: FOOD INSECURITY: WITHIN THE PAST 12 MONTHS, THE FOOD YOU BOUGHT JUST DIDN'T LAST AND YOU DIDN'T HAVE MONEY TO GET MORE.: NEVER TRUE

## 2025-03-11 ASSESSMENT — PATIENT HEALTH QUESTIONNAIRE - PHQ9
2. FEELING DOWN, DEPRESSED OR HOPELESS: NOT AT ALL
SUM OF ALL RESPONSES TO PHQ QUESTIONS 1-9: 0
1. LITTLE INTEREST OR PLEASURE IN DOING THINGS: NOT AT ALL
SUM OF ALL RESPONSES TO PHQ QUESTIONS 1-9: 0

## 2025-03-11 NOTE — PROGRESS NOTES
Chief Complaint   Patient presents with    Follow-up     labs     Patient presents in office today for 6 month f/u and fasting labs.  No concerns.        \"Have you been to the ER, urgent care clinic since your last visit?  Hospitalized since your last visit?\"    NO    “Have you seen or consulted any other health care providers outside our system since your last visit?”    NO                 Alfonso Gilliam (:  1945) is a 80 y.o. male, here for evaluation of the following chief complaint(s):  Follow-up (labs)         Assessment & Plan  1. Erectile dysfunction.  He expressed interest in trying Uroxatral gel, an over-the-counter product from the [employer], for his erectile dysfunction. He is advised to try the over-the-counter Uroxatral gel and report back on its effectiveness.    2. Macular degeneration.  Recent eye examination revealed the presence of drusen, which could be the start of macular degeneration. He has been referred to a retina specialist for further evaluation. He is currently taking macular lutein and bilberry extract supplements.    3. Hypertension.  His blood pressure is well-controlled. He is advised to continue his current medication regimen, including his blood pressure pill and metoprolol.    4. Health maintenance.  A comprehensive metabolic panel (CMP), complete blood count (CBC) with hemoglobin, and lipid panel will be conducted today as part of his 6-month follow-up. His PSA levels were checked recently and were within normal limits.    Results  Laboratory Studies  BUN was 21. Creatinine levels have been normal. PSA levels were normal.  1. Essential hypertension, benign  -     Comprehensive Metabolic Panel; Future  -     CBC with Auto Differential; Future  -     Lipid Panel; Future  2. Prostate cancer (HCC)  3. SIADH (syndrome of inappropriate ADH production)  -     Comprehensive Metabolic Panel; Future  4. Elevated blood sugar  5. Mixed hyperlipidemia  -     Comprehensive Metabolic Panel;

## 2025-03-11 NOTE — PROGRESS NOTES
Chief Complaint   Patient presents with    Follow-up     labs     Patient presents in office today for 6 month f/u and fasting labs.  No concerns.        \"Have you been to the ER, urgent care clinic since your last visit?  Hospitalized since your last visit?\"    NO    “Have you seen or consulted any other health care providers outside our system since your last visit?”    NO

## 2025-03-12 ENCOUNTER — RESULTS FOLLOW-UP (OUTPATIENT)
Facility: CLINIC | Age: 80
End: 2025-03-12

## 2025-05-19 RX ORDER — OLMESARTAN MEDOXOMIL 40 MG/1
40 TABLET ORAL DAILY
Qty: 90 TABLET | Refills: 5 | Status: SHIPPED | OUTPATIENT
Start: 2025-05-19

## 2025-06-11 RX ORDER — SIMVASTATIN 20 MG
20 TABLET ORAL NIGHTLY
Qty: 90 TABLET | Refills: 3 | Status: SHIPPED | OUTPATIENT
Start: 2025-06-11

## (undated) DEVICE — SYR 10ML LUER LOK 1/5ML GRAD --

## (undated) DEVICE — DEVON™ KNEE AND BODY STRAP 60" X 3" (1.5 M X 7.6 CM): Brand: DEVON

## (undated) DEVICE — INFECTION CONTROL KIT SYS

## (undated) DEVICE — SUTURE PERMAHAND SZ 3-0 L30IN NONABSORBABLE BLK SILK BRAID A304H

## (undated) DEVICE — DRAPE,REIN 53X77,STERILE: Brand: MEDLINE

## (undated) DEVICE — DBD-PACK,LAPAROTOMY,2 REINFORCED GOWNS: Brand: MEDLINE

## (undated) DEVICE — SUTURE VCRL SZ 2-0 L27IN ABSRB UD L26MM SH 1/2 CIR J417H

## (undated) DEVICE — REM POLYHESIVE ADULT PATIENT RETURN ELECTRODE: Brand: VALLEYLAB

## (undated) DEVICE — (D)PREP SKN CHLRAPRP APPL 26ML -- CONVERT TO ITEM 371833

## (undated) DEVICE — SURGICAL PROCEDURE PACK BASIN MAJ SET CUST NO CAUT

## (undated) DEVICE — SUTURE PERMAHAND SZ 2-0 L30IN NONABSORBABLE BLK SILK W/O A305H

## (undated) DEVICE — TOWEL SURG W17XL27IN STD BLU COT NONFENESTRATED PREWASHED

## (undated) DEVICE — ROCKER SWITCH PENCIL BLADE ELECTRODE, HOLSTER: Brand: EDGE

## (undated) DEVICE — STRIP,CLOSURE,WOUND,MEDI-STRIP,1/2X4: Brand: MEDLINE

## (undated) DEVICE — DEVICE TRNSF SPIK STL 2008S] MICROTEK MEDICAL INC]

## (undated) DEVICE — SOL IRRIGATION INJ NACL 0.9% 500ML BTL

## (undated) DEVICE — NEEDLE HYPO 22GA L1.5IN BLK S STL HUB POLYPR SHLD REG BVL

## (undated) DEVICE — SUTURE MCRYL SZ 4-0 L27IN ABSRB UD L19MM PS-2 1/2 CIR PRIM Y426H

## (undated) DEVICE — COVER LT HNDL BLU PLAS

## (undated) DEVICE — SUTURE VCRL SZ 3-0 L27IN ABSRB UD L26MM SH 1/2 CIR J416H

## (undated) DEVICE — DRAIN WND PENRS RADPQ 0.25X18 -- CONVERT TO ITEM 360112

## (undated) DEVICE — STERILE POLYISOPRENE POWDER-FREE SURGICAL GLOVES: Brand: PROTEXIS

## (undated) DEVICE — TELFA ADHESIVE ISLAND DRESSING: Brand: TELFA

## (undated) DEVICE — TELFA NON-ADHERENT ABSORBENT DRESSING: Brand: TELFA

## (undated) DEVICE — LIGHT HANDLE: Brand: DEVON

## (undated) DEVICE — STRAP,POSITIONING,KNEE/BODY,FOAM,4X60": Brand: MEDLINE

## (undated) DEVICE — KENDALL SCD EXPRESS SLEEVES, KNEE LENGTH, MEDIUM: Brand: KENDALL SCD

## (undated) DEVICE — (D)STRIP SKN CLSR 0.5X4IN WHT --

## (undated) DEVICE — COVER LT HNDL PLAS RIG 1 PER PK